# Patient Record
Sex: FEMALE | Race: WHITE | NOT HISPANIC OR LATINO | URBAN - METROPOLITAN AREA
[De-identification: names, ages, dates, MRNs, and addresses within clinical notes are randomized per-mention and may not be internally consistent; named-entity substitution may affect disease eponyms.]

---

## 2018-04-04 PROBLEM — 271832001 FLATULENCE, ERUCTATION AND GAS PAIN: Status: ACTIVE | Noted: 2017-10-31

## 2018-04-04 PROBLEM — 274527008 ABNORMAL FINDINGS DIAGNOSTIC IMAGING OF LIVER AND BILIARY TRACT: Status: ACTIVE | Noted: 2018-04-04

## 2018-04-24 PROBLEM — 40739000 DYSPHAGIA: Status: ACTIVE | Noted: 2018-04-24

## 2018-06-26 PROBLEM — 196731005 GASTRODUODENITIS: Status: ACTIVE | Noted: 2018-06-26

## 2018-07-18 PROBLEM — 197091007 DIVERTICULAR DISEASE OF BOTH SMALL AND LARGE INTESTINE WITHOUT PERFORATION OR ABSCESS: Status: ACTIVE | Noted: 2018-07-16

## 2018-07-18 PROBLEM — 3951002: Status: ACTIVE | Noted: 2018-07-18

## 2018-07-18 PROBLEM — 92411005 BENIGN NEOPLASM OF STOMACH: Status: ACTIVE | Noted: 2018-07-16

## 2018-07-18 PROBLEM — 4556007 GASTRITIS: Status: ACTIVE | Noted: 2018-07-16

## 2018-10-16 PROBLEM — 4556007: Status: ACTIVE | Noted: 2018-10-16

## 2020-03-31 PROBLEM — 280992001 PERFORATED DIVERTICULUM OF LARGE INTESTINE: Status: ACTIVE | Noted: 2020-03-31

## 2020-06-01 ENCOUNTER — LAB OUTSIDE AN ENCOUNTER (OUTPATIENT)
Dept: URBAN - METROPOLITAN AREA CLINIC 35 | Facility: CLINIC | Age: 60
End: 2020-06-01

## 2020-06-03 ENCOUNTER — TELEPHONE ENCOUNTER (OUTPATIENT)
Dept: URBAN - METROPOLITAN AREA CLINIC 35 | Facility: CLINIC | Age: 60
End: 2020-06-03

## 2020-06-03 RX ORDER — VANCOMYCIN HYDROCHLORIDE 125 MG/1
1 CAPSULE CAPSULE ORAL
Qty: 40 | Refills: 0 | OUTPATIENT
Start: 2020-06-03

## 2020-06-04 LAB
C. DIFFICILE TOXIN A/B, STOOL - QDX: NEGATIVE
CALPROTECTIN, STOOL - QDX: (no result)
GASTROINTESTINAL PATHOGEN: (no result)
PANCREATICELASTASE ELISA, STOOL: (no result)

## 2020-06-05 ENCOUNTER — TELEPHONE ENCOUNTER (OUTPATIENT)
Dept: URBAN - METROPOLITAN AREA CLINIC 35 | Facility: CLINIC | Age: 60
End: 2020-06-05

## 2020-06-05 RX ORDER — MAGNESIUM OXIDE/MAG AA CHELATE 300 MG
1 CAPSULE WITH A MEAL CAPSULE ORAL ONCE A DAY
Status: ACTIVE | COMMUNITY

## 2020-06-05 RX ORDER — BUPROPION HYDROCHLORIDE 300 MG/1
1 TABLET IN THE MORNING TABLET, EXTENDED RELEASE ORAL ONCE A DAY
Status: ACTIVE | COMMUNITY

## 2020-06-05 RX ORDER — ALPRAZOLAM 2 MG/1
1 TABLET TABLET, EXTENDED RELEASE ORAL TWICE A DAY
Status: ACTIVE | COMMUNITY

## 2020-06-05 RX ORDER — HYDROXYCHLOROQUINE SULFATE 200 MG/1
1 TABLET WITH FOOD OR MILK TABLET ORAL BID
Status: ACTIVE | COMMUNITY

## 2020-06-05 RX ORDER — MESALAMINE 1000 MG/1
1 SUPPOSITORY AT BEDTIME SUPPOSITORY RECTAL ONCE A DAY
Qty: 30 | Status: ACTIVE | COMMUNITY
Start: 2020-05-11

## 2020-06-05 RX ORDER — RIZATRIPTAN BENZOATE 5 MG/1
1 TABLET ON THE TONGUE AND ALLOW TO DISSOLVE AS NEEDED ONE TIME TABLET, ORALLY DISINTEGRATING ORAL ONCE A DAY
Status: ON HOLD | COMMUNITY

## 2020-06-05 RX ORDER — HYDROXYCHLOROQUINE SULFATE 200 MG/1
1 TABLET WITH FOOD OR MILK TABLET ORAL TWICE A DAY
Status: ON HOLD | COMMUNITY

## 2020-06-05 RX ORDER — ZOLPIDEM TARTRATE 5 MG/1
(SCHEDULE IV DRUG) TAKE ONE TABLET BY MOUTH AT BEDTIME TABLET, COATED ORAL
Qty: 30 UNSPECIFIED | Refills: 0 | Status: ACTIVE | COMMUNITY

## 2020-06-05 RX ORDER — HYDROCORTISONE ACETATE 0.5 %
AS DIRECTED CREAM (GRAM) TOPICAL
Status: ACTIVE | COMMUNITY

## 2020-06-05 RX ORDER — SULFAMETHOXAZOLE AND TRIMETHOPRIM 800; 160 MG/1; MG/1
1 TABLET TABLET ORAL TWICE A DAY
Qty: 20 | Refills: 0 | Status: ON HOLD | COMMUNITY
Start: 2020-03-13

## 2020-06-05 RX ORDER — NITROFURANTOIN MONOHYDRATE/MACROCRYSTALLINE 25; 75 MG/1; MG/1
1 CAPSULE AT BEDTIME WITH FOOD CAPSULE ORAL ONCE A DAY
Qty: 30 | Status: ACTIVE | COMMUNITY

## 2020-06-05 RX ORDER — ESOMEPRAZOLE MAGNESIUM 40 MG/1
1 CAPSULE CAPSULE, DELAYED RELEASE ORAL ONCE A DAY
Status: ON HOLD | COMMUNITY

## 2020-06-05 RX ORDER — HYOSCYAMINE SULFATE 0.125 MG
1 TABLET AS NEEDED TABLET ORAL
Qty: 60 TABLET | Refills: 1 | Status: ON HOLD | COMMUNITY
Start: 2018-06-27

## 2020-06-05 RX ORDER — LACTOBACILLUS RHAMNOSUS GG 10B CELL
1 CAPSULE CAPSULE ORAL ONCE A DAY
Status: ACTIVE | COMMUNITY

## 2020-06-05 RX ORDER — NEBIVOLOL HYDROCHLORIDE 5 MG/1
1 TABLET TABLET ORAL ONCE A DAY
Status: ACTIVE | COMMUNITY

## 2020-06-05 RX ORDER — ELUXADOLINE 75 MG/1
1 TABLET WITH FOOD TABLET, FILM COATED ORAL TWICE A DAY
Status: ON HOLD | COMMUNITY
Start: 2018-09-18

## 2020-06-05 RX ORDER — GABAPENTIN 100 MG/1
1 CAPSULE CAPSULE ORAL ONCE A DAY
Status: ON HOLD | COMMUNITY

## 2020-06-05 RX ORDER — VANCOMYCIN HYDROCHLORIDE 125 MG/1
1 CAPSULE CAPSULE ORAL
Qty: 40 | Refills: 0 | Status: ACTIVE | COMMUNITY
Start: 2020-06-03

## 2020-06-05 RX ORDER — IBUPROFEN 800 MG/1
TAKE ONE TABLET BY MOUTH THREE TIMES A DAY TABLET, FILM COATED ORAL
Qty: 90 UNSPECIFIED | Refills: 0 | Status: ACTIVE | COMMUNITY

## 2020-06-05 RX ORDER — MONTELUKAST SODIUM 10 MG/1
1 TABLET IN THE EVENING TABLET, FILM COATED ORAL ONCE A DAY
Status: ACTIVE | COMMUNITY

## 2020-06-05 RX ORDER — MULTIVIT-MIN/IRON/FOLIC ACID/K 18-600-40
AS DIRECTED CAPSULE ORAL
Status: ACTIVE | COMMUNITY

## 2020-06-05 RX ORDER — B-COMPLEX WITH VITAMIN C
1 TABLET TABLET ORAL ONCE A DAY
Status: ACTIVE | COMMUNITY

## 2020-06-05 RX ORDER — TRAMADOL HYDROCHLORIDE 50 MG/1
TABLET, FILM COATED ORAL TWICE DAILY
Status: ACTIVE | COMMUNITY

## 2020-06-05 RX ORDER — METRONIDAZOLE 500 MG/1
1 TABLET TABLET, FILM COATED ORAL THREE TIMES A DAY
Qty: 30 | Refills: 0 | Status: ON HOLD | COMMUNITY
Start: 2020-03-12

## 2020-06-05 RX ORDER — PRAVASTATIN SODIUM 20 MG/1
1 TABLET TABLET ORAL ONCE A DAY
Status: ACTIVE | COMMUNITY

## 2020-06-05 RX ORDER — CHOLECALCIFEROL TAB 50 MCG (2000 UNIT) 50 MCG
1 TABLET TAB ORAL ONCE A DAY
Status: ACTIVE | COMMUNITY

## 2020-06-05 RX ORDER — CIPROFLOXACIN HCL 500 MG
1 TABLET TABLET ORAL
Qty: 20 | Refills: 0 | Status: ON HOLD | COMMUNITY
Start: 2020-03-12

## 2020-06-05 RX ORDER — VORTIOXETINE 10 MG/1
1 TABLET TABLET, FILM COATED ORAL ONCE A DAY
Status: ACTIVE | COMMUNITY

## 2020-06-05 RX ORDER — LEVOMEFOLATE/ALGAL OIL 15-90.314
1 CAPSULE CAPSULE ORAL ONCE A DAY
Status: ACTIVE | COMMUNITY

## 2020-06-05 RX ORDER — HYOSCYAMINE SULFATE 0.12 MG/1
1 TABLET AS NEEDED TABLET ORAL
Qty: 40 TABLET | Refills: 1 | Status: ACTIVE | COMMUNITY
Start: 2020-03-03

## 2020-06-05 RX ORDER — SPIRONOLACTONE 25 MG/1
1 TABLET TABLET ORAL ONCE A DAY
Status: ACTIVE | COMMUNITY

## 2020-06-05 RX ORDER — MESALAMINE 1000 MG/1
1 SUPPOSITORY SUPPOSITORY RECTAL BID
Qty: 60 | Refills: 1 | Status: ON HOLD | COMMUNITY
Start: 2018-07-13

## 2020-06-11 ENCOUNTER — TELEPHONE ENCOUNTER (OUTPATIENT)
Dept: URBAN - METROPOLITAN AREA CLINIC 35 | Facility: CLINIC | Age: 60
End: 2020-06-11

## 2020-06-11 RX ORDER — CHOLESTYRAMINE 4 G/9G
1 PACKET MIXED WITH WATER OR NON-CARBONATED DRINK POWDER, FOR SUSPENSION ORAL TWICE A DAY
Qty: 60 | Refills: 1 | OUTPATIENT
Start: 2020-06-11

## 2020-06-15 ENCOUNTER — TELEPHONE ENCOUNTER (OUTPATIENT)
Dept: URBAN - METROPOLITAN AREA CLINIC 35 | Facility: CLINIC | Age: 60
End: 2020-06-15

## 2020-06-15 RX ORDER — FIDAXOMICIN 200 MG/1
1 TABLET TABLET, FILM COATED ORAL TWICE A DAY
Qty: 20 | Refills: 0 | OUTPATIENT
Start: 2020-06-15

## 2020-06-15 RX ORDER — COLESEVELAM HYDROCHLORIDE 625 MG/1
3 TABLETS WITH MEALS TABLET, FILM COATED ORAL TWICE A DAY
Qty: 180 | Refills: 0 | OUTPATIENT
Start: 2020-06-15

## 2020-06-16 ENCOUNTER — OFFICE VISIT (OUTPATIENT)
Dept: URBAN - METROPOLITAN AREA CLINIC 35 | Facility: CLINIC | Age: 60
End: 2020-06-16

## 2020-06-24 ENCOUNTER — TELEPHONE ENCOUNTER (OUTPATIENT)
Dept: URBAN - METROPOLITAN AREA CLINIC 35 | Facility: CLINIC | Age: 60
End: 2020-06-24

## 2020-06-24 ENCOUNTER — OFFICE VISIT (OUTPATIENT)
Dept: URBAN - METROPOLITAN AREA CLINIC 35 | Facility: CLINIC | Age: 60
End: 2020-06-24

## 2020-06-24 VITALS
DIASTOLIC BLOOD PRESSURE: 74 MMHG | HEART RATE: 84 BPM | HEIGHT: 64 IN | WEIGHT: 150 LBS | SYSTOLIC BLOOD PRESSURE: 118 MMHG | BODY MASS INDEX: 25.61 KG/M2 | TEMPERATURE: 98.4 F | OXYGEN SATURATION: 97 %

## 2020-06-24 PROBLEM — 307496006: Status: ACTIVE | Noted: 2020-03-03

## 2020-06-24 RX ORDER — RIZATRIPTAN BENZOATE 5 MG/1
1 TABLET ON THE TONGUE AND ALLOW TO DISSOLVE AS NEEDED ONE TIME TABLET, ORALLY DISINTEGRATING ORAL ONCE A DAY
Status: ON HOLD | COMMUNITY

## 2020-06-24 RX ORDER — LACTOBACILLUS RHAMNOSUS GG 10B CELL
1 CAPSULE CAPSULE ORAL ONCE A DAY
Status: ACTIVE | COMMUNITY

## 2020-06-24 RX ORDER — MESALAMINE 1000 MG/1
1 SUPPOSITORY SUPPOSITORY RECTAL BID
Qty: 60 | Refills: 1 | Status: ON HOLD | COMMUNITY
Start: 2018-07-13

## 2020-06-24 RX ORDER — COLESEVELAM HYDROCHLORIDE 625 MG/1
3 TABLETS WITH MEALS TABLET, FILM COATED ORAL TWICE A DAY
Qty: 180 | Refills: 0 | Status: ACTIVE | COMMUNITY
Start: 2020-06-15

## 2020-06-24 RX ORDER — ALPRAZOLAM 2 MG/1
1 TABLET TABLET, EXTENDED RELEASE ORAL TWICE A DAY
Status: ACTIVE | COMMUNITY

## 2020-06-24 RX ORDER — VORTIOXETINE 10 MG/1
1 TABLET TABLET, FILM COATED ORAL ONCE A DAY
Status: ACTIVE | COMMUNITY

## 2020-06-24 RX ORDER — FIDAXOMICIN 200 MG/1
1 TABLET TABLET, FILM COATED ORAL TWICE A DAY
Qty: 20 | Refills: 0 | Status: ACTIVE | COMMUNITY
Start: 2020-06-15

## 2020-06-24 RX ORDER — CHOLESTYRAMINE 4 G/9G
1 PACKET MIXED WITH WATER OR NON-CARBONATED DRINK POWDER, FOR SUSPENSION ORAL TWICE A DAY
Qty: 60 | Refills: 1 | Status: ON HOLD | COMMUNITY
Start: 2020-06-11

## 2020-06-24 RX ORDER — HYOSCYAMINE SULFATE 0.125 MG
1 TABLET AS NEEDED TABLET ORAL
Qty: 60 TABLET | Refills: 1 | OUTPATIENT
Start: 2018-06-27

## 2020-06-24 RX ORDER — ELUXADOLINE 75 MG/1
1 TABLET WITH FOOD TABLET, FILM COATED ORAL TWICE A DAY
Status: ON HOLD | COMMUNITY
Start: 2018-09-18

## 2020-06-24 RX ORDER — NITROFURANTOIN MONOHYDRATE/MACROCRYSTALLINE 25; 75 MG/1; MG/1
1 CAPSULE AT BEDTIME WITH FOOD CAPSULE ORAL ONCE A DAY
Qty: 30 | Status: ON HOLD | COMMUNITY

## 2020-06-24 RX ORDER — IBUPROFEN 800 MG/1
TAKE ONE TABLET BY MOUTH THREE TIMES A DAY TABLET, FILM COATED ORAL
Qty: 90 UNSPECIFIED | Refills: 0 | Status: ACTIVE | COMMUNITY

## 2020-06-24 RX ORDER — SPIRONOLACTONE 25 MG/1
1 TABLET TABLET ORAL ONCE A DAY
Status: ACTIVE | COMMUNITY

## 2020-06-24 RX ORDER — ZOLPIDEM TARTRATE 5 MG/1
(SCHEDULE IV DRUG) TAKE ONE TABLET BY MOUTH AT BEDTIME TABLET, COATED ORAL
Qty: 30 UNSPECIFIED | Refills: 0 | Status: ACTIVE | COMMUNITY

## 2020-06-24 RX ORDER — MAGNESIUM OXIDE/MAG AA CHELATE 300 MG
1 CAPSULE WITH A MEAL CAPSULE ORAL ONCE A DAY
Status: ACTIVE | COMMUNITY

## 2020-06-24 RX ORDER — BUPROPION HYDROCHLORIDE 300 MG/1
1 TABLET IN THE MORNING TABLET, EXTENDED RELEASE ORAL ONCE A DAY
Status: ACTIVE | COMMUNITY

## 2020-06-24 RX ORDER — HYOSCYAMINE SULFATE 0.12 MG/1
1 TABLET AS NEEDED TABLET ORAL
Qty: 40 TABLET | Refills: 1 | Status: ACTIVE | COMMUNITY
Start: 2020-03-03

## 2020-06-24 RX ORDER — HYDROXYCHLOROQUINE SULFATE 200 MG/1
1 TABLET WITH FOOD OR MILK TABLET ORAL BID
Status: ACTIVE | COMMUNITY

## 2020-06-24 RX ORDER — MESALAMINE 1000 MG/1
1 SUPPOSITORY AT BEDTIME SUPPOSITORY RECTAL ONCE A DAY
Qty: 30 | Status: ACTIVE | COMMUNITY
Start: 2020-05-11

## 2020-06-24 RX ORDER — SULFAMETHOXAZOLE AND TRIMETHOPRIM 800; 160 MG/1; MG/1
1 TABLET TABLET ORAL TWICE A DAY
Qty: 20 | Refills: 0 | Status: ON HOLD | COMMUNITY
Start: 2020-03-13

## 2020-06-24 RX ORDER — FIDAXOMICIN 200 MG/1
1 TABLET TABLET, FILM COATED ORAL TWICE A DAY
Qty: 10 TABLET | OUTPATIENT
Start: 2020-06-24

## 2020-06-24 RX ORDER — GABAPENTIN 100 MG/1
1 CAPSULE CAPSULE ORAL ONCE A DAY
Status: ON HOLD | COMMUNITY

## 2020-06-24 RX ORDER — TRAMADOL HYDROCHLORIDE 50 MG/1
TABLET, FILM COATED ORAL TWICE DAILY
Status: ACTIVE | COMMUNITY

## 2020-06-24 RX ORDER — NEBIVOLOL HYDROCHLORIDE 5 MG/1
1 TABLET TABLET ORAL ONCE A DAY
Status: ACTIVE | COMMUNITY

## 2020-06-24 RX ORDER — METRONIDAZOLE 500 MG/1
1 TABLET TABLET, FILM COATED ORAL THREE TIMES A DAY
Qty: 30 | Refills: 0 | Status: ON HOLD | COMMUNITY
Start: 2020-03-12

## 2020-06-24 RX ORDER — HYDROXYCHLOROQUINE SULFATE 200 MG/1
1 TABLET WITH FOOD OR MILK TABLET ORAL TWICE A DAY
Status: ON HOLD | COMMUNITY

## 2020-06-24 RX ORDER — B-COMPLEX WITH VITAMIN C
1 TABLET TABLET ORAL ONCE A DAY
Status: ACTIVE | COMMUNITY

## 2020-06-24 RX ORDER — CIPROFLOXACIN HCL 500 MG
1 TABLET TABLET ORAL
Qty: 20 | Refills: 0 | Status: ON HOLD | COMMUNITY
Start: 2020-03-12

## 2020-06-24 RX ORDER — ESOMEPRAZOLE MAGNESIUM 40 MG/1
1 CAPSULE CAPSULE, DELAYED RELEASE ORAL ONCE A DAY
Status: ON HOLD | COMMUNITY

## 2020-06-24 RX ORDER — VANCOMYCIN HYDROCHLORIDE 125 MG/1
1 CAPSULE CAPSULE ORAL
Qty: 40 | Refills: 0 | Status: ON HOLD | COMMUNITY
Start: 2020-06-03

## 2020-06-24 RX ORDER — PRAVASTATIN SODIUM 20 MG/1
1 TABLET TABLET ORAL ONCE A DAY
Status: ACTIVE | COMMUNITY

## 2020-06-24 RX ORDER — HYDROCORTISONE ACETATE 0.5 %
AS DIRECTED CREAM (GRAM) TOPICAL
Status: ACTIVE | COMMUNITY

## 2020-06-24 RX ORDER — HYOSCYAMINE SULFATE 0.125 MG
1 TABLET AS NEEDED TABLET ORAL
Qty: 60 TABLET | Refills: 1 | Status: ON HOLD | COMMUNITY
Start: 2018-06-27

## 2020-06-24 RX ORDER — MONTELUKAST SODIUM 10 MG/1
1 TABLET IN THE EVENING TABLET, FILM COATED ORAL ONCE A DAY
Status: ACTIVE | COMMUNITY

## 2020-06-24 RX ORDER — CHOLECALCIFEROL TAB 50 MCG (2000 UNIT) 50 MCG
1 TABLET TAB ORAL ONCE A DAY
Status: ACTIVE | COMMUNITY

## 2020-06-24 RX ORDER — MULTIVIT-MIN/IRON/FOLIC ACID/K 18-600-40
AS DIRECTED CAPSULE ORAL
Status: ACTIVE | COMMUNITY

## 2020-06-24 RX ORDER — LEVOMEFOLATE/ALGAL OIL 15-90.314
1 CAPSULE CAPSULE ORAL ONCE A DAY
Status: ACTIVE | COMMUNITY

## 2020-06-24 NOTE — HPI-MIGRATED HPI
;   ;   ;   ;   ;   ;   ;     Abdominal Pain : Patient contact the office on (5/15/2020) c/o continue to experience abdominal pain and fecal incontinence 5+ times a day.   She admit lower abdominal pain below the umbilicus. Symptoms are present most morning upon waking up and will persist throughout the day.  She has been taking Hyoscyamine 2-3 times a day with temporary relief.       Last visit (5/15/2020) Patient presents today via televisit with consent for a follow up of increase LLQ abdominal pain.   Pain has been present constant and described as a dull ache. Denies any aggravating factors.   Admit some relief with the use of Hyoscyamine 1 BID.  She contact the office on (3/8/2020) c/o 5-6 loose BM's per day with fecal seepage and LLQ abdominal pain. Symptoms were improved then began to increase several weeks ago.  Denies melena, blood or mucus in stools. Denies pruritus ani, rectal pain or bleeding.   Abdominal pain is described as a dull ache that occur randomly throughout the day and will last for minutes to hours.  She ran out of the Hyoscyamine weeks ago.  Denies fever or chills.  Subsequently patient was informed to go to ER if LLQ pain is persistent and worse.      Last visit (3/31/2020) Patient presents today via tele visit to review CT, labs, stool study and follow up of LLQ abdominal pain associated with diverticulitis.  She admit improvement of symptoms with the use of Hyoscyamine and Ibuprofen 800 mg prn .  Last episode occurred yesterday.   Described as a sharp pain, that is typically present each day upon waking in the mornings.  She completed treatment for diverticulitis, Bactrim DS and Flagyl on (3/22/2020)   Of note, patient contact the office on (3/13/2020) with c/o 5-6 loose and watery BM's per day and episodes of fecal incontinence since 2 days after her last visit.  Described as full fecal seepage that occur with bending over or walking.   She state she has been treated with 3 antibiotics since Jan. 2020- Cefdnir x 10 days in Jan. 2020 for URI, then Cipro 500 mg 1 BID x 7 days on (2/3/2020), which she took for 3 days then told to d/c and start Moxifloxacin 100 mg x 10 days on (2/11/2020) due to the Diverticulitis.   Subsequently a stool study was ordered, treated with Flagyl 500 mg TID, Bactrim DS BID x 10 days, take Imodium prn with caution.  On (3/19/2020) patient continue to c/o LLQ abdominal pain upon waking up in the mornings. Pain is sharp in nature.  She will take Hyoscyamine, but it will take 1-2 hours to get relief.   Last visit (3/3/2020) Patient presents today for a follow up of abdominal pain. She denies any episodes of abdominal pain since her last office visit.   Patient had a CT Chest/ABD/Pelvis completed on 2/5/2020 showing mild diverticulitis of the proximal descending colon, with mild moderate inflammatory changes, including fat stranding and trace fluid. Moderate diffuse diverticulosis of the colon, more extensively involving the left colon. Groundglass infiltrate in the posterior right upper lobe, suspicious for possible pneumonia. Mild hepatic steatosis.   Patient admits that she was on abx and steroids due to a respiratory infection in December   Last visit (1/15/2019) Patient admits experiencing abdominal pain rarely since her last office visit.    10/16/18 Patient marks improvement of abdominal pain. She states that she has had 4-5 episodes since her last office visit 09/18/2018.  09/18/2018 Patient continues to have abdominal pain daily. She describes episodes as a cramping sensation throughout her lower abdomen. She typically begins to have symptoms after consuming a meal.  She is unsure if symptoms are improved with Dicyclomine 10 mg BID.   Of note, Patient called the office on 09/05/2018 complaining of abdominal cramping and  fecal incontinence. As a result we increased her Welchol 625mg from 2 tabs po bid to 2 tabs po tid.   08/22/2018 She admit improvement of symptoms with the use of Dicyclomine 10 mg BID.    Of note, patient contact the office (8/1/2018) stating the Hyoscyamine wasn't as effective, subsequently she was changed to Dicyclomine 10 mg po TID prn.  Last visit (7/18/2018) Patient continues to have abdominal discomfort. She admits the pain is infrequent and ranges from the left to right side of her abdomen. She continues to deny any aggravating factors.    Last visit (6/26/2018) Patient continues to have right sided abdominal pain accompanied with diarrhea daily. She describes episodes as a severe cramping sensation. She denies any aggravating factors.    04/24/2018 Continues to have right sided abdominal pain , intermittent severity , constant . No clear aggravating or alleviating factors     At last visit (10/31/2017) Admits abdominal pain that is located mid region and occur in an intermittent pattern.  Onset Dec. 2016 right after her hemorrhoidectomy and never got better.  Described as a dull ache that last for minutes to hours at a time.   Symptoms are mostly noticed upon waking up.  Admit relief of symptoms after defecation.     Patient denies any episodes of RUQ abdominal pain this year.  Symptoms were described as a dull aching to crampy and sharp pain.  Pain was initially intermittently, then  began to  worsen first of May. Patient notes as symptoms progressively worsen she went to [facility] ER 5/3/2016 and at that time her pain was severe, level 8 /10.  The Location of pain at the time she was seen in ER was RUQ abdomen and radiated to right side of her back, and middle of the back. Pain was worse with inhalation.  She had CT Abdomen, Chest Xray and was treated with IV fluids, Morphine with great relief of symptoms. Patient was discharged with [facility] 5/325 #12 and to follow up with GI, and PCP.  She denies any abdominal pain since the day after she discharged from [facility] ER.  Patient denies the use of antibiotics within the last (6) months. 	    Outside Labs:  (5/3/2016) Glucose 105 (H), Creatinine 1.16 (H), Protein Total 8.5 (H), ALT 36 (N), AST 20 (N), Albumin 4.5 (N), Total Bilirubin 0.6 (N), Total Calcium 10.6 (H), Alkaline Phosphatase 65 (N), Lipase 391 (N), WBC 7.2 (N), RBC 4.98 (N), Hemoglobin 15.4 (N), Hematocrit 44.4 (H), Platelet 345 (N).    Outside Imaging: (5/3/2016) CT Pelvis with impression: No acute intra-abdominal process. Colonic diverticulosis without diverticulitis. Normal appendix. No bowel obstruction. Persistent prominent appearance of the pancreatic duct measuring up to 3.9 mm in maximal diameter. Fatty liver with hepatomegaly.  (05/15/2015) CT Abdomen and Pelvis with contrast impression: Normal appendix. Descending and sigmoid colon diverticula. No diverticulitis. Borderline pancreatic ductal dilation. No biliary ductal dilation. Further evaluation with MRI of the abdomen and MRCP may be obtained.;   Fecal incontinence : Continue to experience Fecal incontinence episodes daily associated with diarrhea episodes, 5+ times a day..  She has been wearing under pads due to symptoms.   Last visit (5/11/2020) Admit having fecal incontinence while sitting last week. She has a Cystocele/Rectocele since 2016 and report having Vaginal Pessary Incerted in (2019) performed by Dr. Edie Holden.   Last visit (3/31/2020) Continue to experience Fecal incontinence episodes daily associated with diarrhea episodes.  She has been wearing under pads due to symptoms.    Of note, patient contact the office on (3/13/2020) with c/o 5-6 loose and watery BM's per day and episodes of fecal incontinence since 2 days after her last visit.  Described as full fecal seepage that occur with bending over or walking.      Last visit (3/3/2020) Patient denies episodes of Fecal incontinence since last office visit.  Last visit (1/15/2019) Patient denies experiencing any fecal incontinence episodes since her last office visit.    10/16/18 Patient states that she has had improvement of symptoms. She has had less episodes since having a pessary device inserted.  09/18/2018 Patient continues to have episodes of fecal incontinence daily.   08/22/2018 She continue to experience fecal incontinence. Symptoms occur 1-2 times per day.  She continue to ware a pad in her under garment, because she does not feel the leakage.    Last visit (7/18/2018)Patient continues to have fecal incontinence.   Last visit (6/26/2018) Patient continues to have fecal incontinence. She states that she has seen Dr. Butt since her last visit. Of note, this was prior to her having diarrhea. At the time of her consultation he diagnosed her with cystocele and rectocele. In addition, they discussed her having a device placed in her back to help control her sphincter.   04/24/2018 Continues to have fecal incontinence and will be following up with Dr. ATUL Butt Continue to experience Fecal incontinence episodes daily associated with diarrhea episodes.  she has been wearing under pads due to symptoms.    Of note, patient contact the office on (3/13/2020) with c/o 5-6 loose and watery BM's per day and episodes of fecal incontinence since 2 days after her last visit.  Described as full fecal seepage that occur with bending over or walking.      Last visit (3/3/2020) Patient denies episodes of Fecal incontinence since last office visit.  Last visit (1/15/2019) Patient denies experiencing any fecal incontinence episodes since her last office visit.    10/16/18 Patient states that she has had improvement of symptoms. She has had less episodes since having a pessary device inserted.  09/18/2018 Patient continues to have episodes of fecal incontinence daily.   08/22/2018 She continue to experience fecal incontinence. Symptoms occur 1-2 times per day.  She continue to ware a pad in her under garment, because she does not feel the leakage.    Last visit (7/18/2018)Patient continues to have fecal incontinence.   Last visit (6/26/2018) Patient continues to have fecal incontinence. She states that she has seen Dr. Butt since her last visit. Of note, this was prior to her having diarrhea. At the time of her consultation he diagnosed her with cystocele and rectocele. In addition, they discussed her having a device placed in her back to help control her sphincter.   04/24/2018 Continues to have fecal incontinence and will be following up with Dr. ATUL Butt ;   Abnormal MRI : Last visit (1/15/2019) Patient admits that she has had a heart test completed as well as a MRI of her neck completed in December and she has not yet received the results.  10/16/2018 Denie any recent imaging completed.  09/18/2018 No recent imaging completed.  04/24/2018 Patient presents today for a follow up of her pancreas  divisum which was diagnosed via MRI Abdomen May 29, 2015.  She notes of having low back pain in May 2015 and was seen by her PCP who subsequently ordered an MRI Abdomen w/wo contrast.  (5/29/2015) MR Abdomen w/wo contrast impression: Pancreas divisum with separate drainage of the pancreatic duct and common bile duct ar the duodenum. Mild, diffuse pancreatic ductal dilation without obstructing mass or calculus.    Last visit (1/15/2019) Patient admits that she has had a heart test completed as well as a MRI of her neck completed in December and she has not yet received the results.  10/16/18 No recent imaging completed.  09/18/2018 No recent imaging completed.  04/24/2018 Patient presents today for a follow up of her pancreas  divisum which was diagnosed via MRI Abdomen May 29, 2015.  She notes of having low back pain in May 2015 and was seen by her PCP who subsequently ordered an MRI Abdomen w/wo contrast.  (5/29/2015) MR Abdomen w/wo contrast impression: Pancreas divisum with separate drainage of the pancreatic duct and common bile duct ar the duodenum. Mild, diffuse pancreatic ductal dilation without obstructing mass or calculus. ;   Dysphagia : She continue to deny episodes of dysphagia Last visit (5/11/2020) She continue to deny episodes of dysphagia  Last visit (3/31/2020) She continue to deny episodes of dyspragia.  Last visit (3/3/2020) Patient denies episodes of dyspragia since last office visit.  Last visit (1/15/2019) Patient denies any episodes of dysphagia since her EGD with dilation was completed.   10/16/18 Patient continues to deny episodes of dysphagia.   09/18/2018 Patient continues to deny episodes of dysphagia.    08/22/2018 Continue to deny dysphagia since EGD w/dilation.    Last visit (7/18/2018) Patient denies recent episodes of dysphagia.  Last visit (6/26/2018) Patient denies recent episodes of dysphagia.  04/24/2018 Continues to have dysphagia with solid food , upper esophagus , few times a week    At last visit (10/31/2017)Difficulty in swallowing since the past year, few times a month associated with dryness of mouth due to Sjogren's She continue to deny episodes of dyspragia.  Last visit (3/3/2020) Patient denies episodes of dyspragia since last office visit.  Last visit (1/15/2019) Patient denies any episodes of dysphagia since her EGD with dilation was completed.   10/16/18 Patient continues to deny episodes of dysphagia.   09/18/2018 Patient continues to deny episodes of dysphagia.    08/22/2018 Continue to deny dysphagia since EGD w/dilation.    Last visit (7/18/2018) Patient denies recent episodes of dysphagia.  Last visit (6/26/2018) Patient denies recent episodes of dysphagia.  04/24/2018 Continues to have dysphagia with solid food , upper esophagus , few times a week    At last visit (10/31/2017)Difficulty in swallowing since the past year, few times a month associated with dryness of mouth due to Sjogren's    ;   Fatty Liver : Last visit (3/31/2020) She continue to deny  jaundice, chills, RUQ pains, dizziness, or light headedness. Admit still feeling fatigue she is unsure if this has to do with her Liver or her Sjrogen's Disease.   Last visit (3/3/2020) Patient denies jaundice, chills, RUQ pains, dizziness, or light headedness. Admits still feeling fatigue she is unsure if this has to do with her Liver or her Sjrogen's Disease.  Last visit (01/15/2019) Patient currently denies jaundice, chills, RUQ pains, dizziness, or light headedness. Admits still feeling fatigue.    10/16/18 Patient was diagnosed with fatty liver in 2017.  She currently denies jaundice, chills, RUQ pains, dizziness, or light headedness Admit easy bruising over past few months, which is being follow by Hematologist Dr. Hakeem Fallon.  Admit several year history of feeling fatigue.    09/18/2018 Patient was diagnosed with fatty liver in 2017.  She currently denies jaundice, chills, RUQ pains, dizziness, or light headedness Admit easy bruising over past few months, which is being follow by Hematologist Dr. Hakeem Fallon.  Admit several year history of feeling fatigue.   08/22/2018 Patient was diagnosed with a fatty liver in 2017. Most recent labs with PCP Dr. Michelle Naidu a month ago due to skin rash from an allergic reaction from an unknown source. Labs completed (7/12/2018) revealed CBC and CMP within normal limits.   She currently denies jaundice, chills, RUQ pains, dizziness, or light headedness Admit easy bruising over past few months, which is being follow by Hematologist Dr. Hakeem Fallon.  Admit several year history of feeling fatigue.    04/24/2018 Patient was found to have fatty liver via CT abdomen on May 3, 2017.  Most recent labs with PCP Dr. Michelle Naidu  Aug. 23, 2017 at which time patient states her liver enzymes were fine, just had elevated calcium levels.  Patient currently denies jaundice, chills, RUQ pains, dizziness, or light headedness Admit easy bruising over past few months, which is being follow by Hematologist Dr. Hakeem Fallon.  Admit several year history of feeling fatigue.   Outside Labs:  (5/3/2016) Glucose 105 (H), Creatinine 1.16 (H), Protein Total 8.5 (H), ALT 36 (N), AST 20 (WNL), Albumin 4.5 (N), Total Bilirubin 0.6 (N), Total Calcium 10.6 (H), Alkaline Phosphatase 65 (N), Lipase 391 (N), WBC 7.2 (N), RBC 4.98 (N), Hemoglobin 15.4 (N), Hematocrit 44.4 (H), Platelet 345 (N).  (10/31/2017)NAFLD Score: -0.979   &lt; -1.455: predictor of absence of significant fibrosis (F0-F2 fibrosis) ? -1.455 to ? 0.675: indeterminate score > 0.675: predictor of presence of significant fibrosis (F3-F4 fibrosis)     Outside Imaging: (5/3/2016) CT Pelvis with impression: No acute intra-abdominal process. Colonic diverticulosis without diverticulitis. Normal appendix. No bowel obstruction. Persistent prominent appearance of the pancreatic duct measuring up to 3.9 mm in maximal diameter. Fatty liver with hepatomegaly.  (05/15/2015) CT Abdomen and Pelvis with contrast impression: Normal appendix. Descending and sigmoid colon diverticula. No diverticulitis. Borderline pancreatic ductal dilation. No biliary ductal dilation. Further evaluation with MRI of the abdomen and MRCP may be obtained. She continue to deny  jaundice, chills, RUQ pains, dizziness, or light headedness. Admit still feeling fatigue she is unsure if this has to do with her Liver or her Sjrogen's Disease.   Last visit (3/3/2020) Patient denies jaundice, chills, RUQ pains, dizziness, or light headedness. Admits still feeling fatigue she is unsure if this has to do with her Liver or her Sjrogen's Disease.  Last visit (01/15/2019) Patient currently denies jaundice, chills, RUQ pains, dizziness, or light headedness. Admits still feeling fatigue.    10/16/18 Patient was diagnosed with fatty liver in 2017.  She currently denies jaundice, chills, RUQ pains, dizziness, or light headedness Admit easy bruising over past few months, which is being follow by Hematologist Dr. Hakeem Fallon.  Admit several year history of feeling fatigue.    09/18/2018 Patient was diagnosed with fatty liver in 2017.  She currently denies jaundice, chills, RUQ pains, dizziness, or light headedness Admit easy bruising over past few months, which is being follow by Hematologist Dr. Hakeem Fallon.  Admit several year history of feeling fatigue.   08/22/2018 Patient was diagnosed with a fatty liver in 2017. Most recent labs with PCP Dr. Michlele Naidu a month ago due to skin rash from an allergic reaction from an unknown source. Labs completed (7/12/2018) revealed CBC and CMP within normal limits.   She currently denies jaundice, chills, RUQ pains, dizziness, or light headedness Admit easy bruising over past few months, which is being follow by Hematologist Dr. Hakeem Fallon.  Admit several year history of feeling fatigue.    04/24/2018 Patient was found to have fatty liver via CT abdomen on May 3, 2017.  Most recent labs with PCP Dr. Michelle Naidu  Aug. 23, 2017 at which time patient states her liver enzymes were fine, just had elevated calcium levels.  Patient currently denies jaundice, chills, RUQ pains, dizziness, or light headedness Admit easy bruising over past few months, which is being follow by Hematologist Dr. Hakeem Fallon.  Admit several year history of feeling fatigue.   Outside Labs:  (5/3/2016) Glucose 105 (H), Creatinine 1.16 (H), Protein Total 8.5 (H), ALT 36 (N), AST 20 (WNL), Albumin 4.5 (N), Total Bilirubin 0.6 (N), Total Calcium 10.6 (H), Alkaline Phosphatase 65 (N), Lipase 391 (N), WBC 7.2 (N), RBC 4.98 (N), Hemoglobin 15.4 (N), Hematocrit 44.4 (H), Platelet 345 (N).  (10/31/2017)NAFLD Score: -0.979   &lt; -1.455: predictor of absence of significant fibrosis (F0-F2 fibrosis) ? -1.455 to ? 0.675: indeterminate score > 0.675: predictor of presence of significant fibrosis (F3-F4 fibrosis)     Outside Imaging: (5/3/2016) CT Pelvis with impression: No acute intra-abdominal process. Colonic diverticulosis without diverticulitis. Normal appendix. No bowel obstruction. Persistent prominent appearance of the pancreatic duct measuring up to 3.9 mm in maximal diameter. Fatty liver with hepatomegaly.  (05/15/2015) CT Abdomen and Pelvis with contrast impression: Normal appendix. Descending and sigmoid colon diverticula. No diverticulitis. Borderline pancreatic ductal dilation. No biliary ductal dilation. Further evaluation with MRI of the abdomen and MRCP may be obtained. ;   Diarrhea : Patient presents today for a follow up of diarrhea, review MRI, and QDx results.  Patient was notified on (6/3/2020) QDx stool study was positive for C. Diff, subsequently she was treated with Vancomycin 125 mg po QID for 10 days.  Patient contact the office on (6/15/2020) c/o symptoms are no better. Also continue to experience abdominal pain and fecal incontinence 5+ times a day.    She also want to know if the cholestyramine come in a tablet form, because it say it can damage teeth, which she already has risk of dental damage from her Sjogren's.   Subsequently, patient was treated with Dificid 200 mg po BID for 10 days. Welchol 625 mg III po BID and Imodium - 1-2,  4 times a day prn.  She has not taken Imodium.   She admit bowel movements were getting back to normal last week over the course of 3 days while on a bland diet with having 3 normal formed BM's per day.  While off the diet she would have 3-5 BM's per day with a couple episodes fecal incontinence.  Denies melena, blood or mucus in stools.     Last visit (5/11/2020) Admit 5-6 loose BM's per day with fecal seepage and LLQ abdominal pain. Symptoms were improved then began to increase several weeks ago.  Denies melena, blood or mucus in stools.  Denies pruritus ani, rectal pain or bleeding.    She report maybe 1-2 days a week she will have normal and formed bowel movements.     Last visit (3/31/2020) Admit 5-6 bowel movements per day with 2-3 episodes of diarrhea at least once a day since last office visit.  Denies melena, blood or mucus in stools.  Of note, patient contact the office on (3/13/2020) with c/o 5-6 loose and watery BM's per day and episodes of fecal incontinence since 2 days after her last visit.  Described as full fecal seepage that occur with bending over or walking.    Subsequently a stool study was ordered, treated with Flagyl 500 mg TID, Bactrim DS BID x 10 days, take Imodium prn with caution.  She completed the 10 day course of Flagyl 500 mg TID, Bactrim DS BID on (3/22/2020).   Last visit (3/3/2020)She reports 2-3 bowel movements a day with no strain. She admits her stools are formed with no bleeding, melena, or mucus at this time.   Last visit (1/15/2019) Patient presents today for a follow up of diarrhea. Patient admits rarely experiencing diarrhea since her last office visit. Patient admits 1-3 bowel movements a day. Stools are normal in form.   Patient admits that she is scheduled to see Dr Holden tomorrow.       Patient denies the use of Welchol Tablet, 625 MG, 2 tablets prn with meals, Orally, TID. Patient states that she wanted to clear her body from the medications that she was taking.   Patient denies the use of Dicyclomine HCl Capsule, 10 MG, 1 tablet prn, Orally, TID. Patient states that she felt that this medication wasn't helping her so she stopped taking it.    10/16/18 Patient presents today for a follow up of diarrhea. She states there has been an improvement of symptoms and she admits to having 2-3 bowel movements per day. Ranging from solid to loose stools.   Patient was evaluated by Dr. Holden a Urogynecologist at [facility] who administered a pessary, which is a prosthetic device that can be inserted into the vagina to support its internal structure. She was advised this device tends to improve diarrhea and fecal incontinence in patients that have rectocele. Patient also has started Pelvic Floor Therapy and has completed two sessions.   Of note, patient never started Viberzi Tablet, 75 MG.   09/18/2018 Patient presents today for a follow up of diarrhea. She continues to have episodes of watery/loose stools daily. She states she can have up to 5-6 bowel movements per day. She denies blood, mucous or melena.    08/22/2018 Patient presents today to review QDx stool study, lab results and follow up of diarrhea. She has been taking Welchol 625 MG, 2 BID with some improvement in bowel habits.  Currently admit 2-3 defecations per day.  Stools are 50% of the time normal formed and loose/watery.  Denies melena, blood or mucus in stools.  Denies fecal urgency.  Continue to admit episodes of fecal incontinence.      Last visit (7/18/2018) Patient presents today for a follow up after her colonoscopy, EGD and labs.  She denies any complications after her procedure. She continues to have 3-4 bowel movements per day ranging from solid to watery/loose. She denies mucous or melena. She does admit to occasional bright red blood present on toilet paper following a bowel movement. She denies taking  Imodium A-D or Hyoscyamine.  Last visit (6/26/2018) Patient presents today for a consultation of diarrhea since the past 6 months . She states that symptoms started around June 9 th, 2018 after eating dinner. She was evaluated by [facility] on Hannah 10, 2018 for her symptoms. At that time stool studies were completed. She is currently having approximately five watery/loose stools per day. She denies any blood, mucous or melena. However, patient admits the amount of stools may increase if she consumes more food. She has been avoiding eating because that triggers her to have a bowel movement. She has had an eight pound unintentional weight loss since her last visit on 04/24/2018.    She did have an MRI of the abdomen completed on 04/06/2018 which revealed subtle sludge or concentrated bile within the gallbladder.  Per patient her last colonoscopy was 2-3 years ago. She denies having colon polyps at that time.  Of, note patient was prescribed Keflex on May 20, 2018. Admit 5-6 bowel movements per day with 2-3 episodes of diarrhea at least once a day since last office visit.  Denies melena, blood or mucus in stools.  Of note, patient contact the office on (3/13/2020) with c/o 5-6 loose and watery BM's per day and episodes of fecal incontinence since 2 days after her last visit.  Described as full fecal seepage that occur with bending over or walking.    Subsequently a stool study was ordered, treated with Flagyl 500 mg TID, Bactrim DS BID x 10 days, take Imodium prn with caution.  She completed the 10 day course of Flagyl 500 mg TID, Bactrim DS BID on (3/22/2020).   Last visit (3/3/2020)She reports 2-3 bowel movements a day with no strain. She admits her stools are formed with no bleeding, melena, or mucus at this time.   Last visit (1/15/2019) Patient presents today for a follow up of diarrhea. Patient admits rarely experiencing diarrhea since her last office visit. Patient admits 1-3 bowel movements a day. Stools are normal in form.   Patient admits that she is scheduled to see Dr Holden tomorrow.       Patient denies the use of Welchol Tablet, 625 MG, 2 tablets prn with meals, Orally, TID. Patient states that she wanted to clear her body from the medications that she was taking.   Patient denies the use of Dicyclomine HCl Capsule, 10 MG, 1 tablet prn, Orally, TID. Patient states that she felt that this medication wasn't helping her so she stopped taking it.    10/16/18 Patient presents today for a follow up of diarrhea. She states there has been an improvement of symptoms and she admits to having 2-3 bowel movements per day. Ranging from solid to loose stools.   Patient was evaluated by Dr. Holden a Urogynecologist at [facility] who administered a pessary, which is a prosthetic device that can be inserted into the vagina to support its internal structure. She was advised this device tends to improve diarrhea and fecal incontinence in patients that have rectocele. Patient also has started Pelvic Floor Therapy and has completed two sessions.   Of note, patient never started Viberzi Tablet, 75 MG.   09/18/2018 Patient presents today for a follow up of diarrhea. She continues to have episodes of watery/loose stools daily. She states she can have up to 5-6 bowel movements per day. She denies blood, mucous or melena.    08/22/2018 Patient presents today to review QDx stool study, lab results and follow up of diarrhea. She has been taking Welchol 625 MG, 2 BID with some improvement in bowel habits.  Currently admit 2-3 defecations per day.  Stools are 50% of the time normal formed and loose/watery.  Denies melena, blood or mucus in stools.  Denies fecal urgency.  Continue to admit episodes of fecal incontinence.      Last visit (7/18/2018) Patient presents today for a follow up after her colonoscopy, EGD and labs.  She denies any complications after her procedure. She continues to have 3-4 bowel movements per day ranging from solid to watery/loose. She denies mucous or melena. She does admit to occasional bright red blood present on toilet paper following a bowel movement. She denies taking  Imodium A-D or Hyoscyamine.  Last visit (6/26/2018) Patient presents today for a consultation of diarrhea since the past 6 months . She states that symptoms started around June 9 th, 2018 after eating dinner. She was evaluated by [facility] on Hannah 10, 2018 for her symptoms. At that time stool studies were completed. She is currently having approximately five watery/loose stools per day. She denies any blood, mucous or melena. However, patient admits the amount of stools may increase if she consumes more food. She has been avoiding eating because that triggers her to have a bowel movement. She has had an eight pound unintentional weight loss since her last visit on 04/24/2018.    She did have an MRI of the abdomen completed on 04/06/2018 which revealed subtle sludge or concentrated bile within the gallbladder.  Per patient her last colonoscopy was 2-3 years ago. She denies having colon polyps at that time.  Of, note patient was prescribed Keflex on May 20, 2018.;   Hospital Follow Up : Denies any hospitalization since last visit.  Last visit (3/31/2020) Denies any hospitalization since last visit.   Patient presented to [facility] ED June 22, 2019 with abdominal pain.  Onset 5 days prior to ER.  Described as a constant pain located right abdomen and flank area.  Denies any aggravating factors. Denies any hospitalization since.     Patient presented to [facility] ED June 22, 2019 with abdominal pain.  Onset 5 days prior to ER.  Described as a constant pain located right abdomen and flank area.  Denies any aggravating factors.;

## 2020-07-06 ENCOUNTER — TELEPHONE ENCOUNTER (OUTPATIENT)
Dept: URBAN - METROPOLITAN AREA CLINIC 35 | Facility: CLINIC | Age: 60
End: 2020-07-06

## 2020-07-06 RX ORDER — DICYCLOMINE HYDROCHLORIDE 10 MG/1
1 CAPSULE CAPSULE ORAL
Qty: 30 | Refills: 1 | OUTPATIENT
Start: 2020-07-06

## 2020-07-13 ENCOUNTER — TELEPHONE ENCOUNTER (OUTPATIENT)
Dept: URBAN - METROPOLITAN AREA CLINIC 35 | Facility: CLINIC | Age: 60
End: 2020-07-13

## 2020-07-13 RX ORDER — COLESEVELAM HYDROCHLORIDE 625 MG/1
3 TABLETS WITH MEALS TABLET, FILM COATED ORAL TWICE A DAY
Qty: 180 | Refills: 0 | OUTPATIENT
Start: 2020-06-15

## 2020-07-14 ENCOUNTER — TELEPHONE ENCOUNTER (OUTPATIENT)
Dept: URBAN - METROPOLITAN AREA CLINIC 35 | Facility: CLINIC | Age: 60
End: 2020-07-14

## 2020-07-16 ENCOUNTER — TELEPHONE ENCOUNTER (OUTPATIENT)
Dept: URBAN - METROPOLITAN AREA CLINIC 35 | Facility: CLINIC | Age: 60
End: 2020-07-16

## 2020-07-16 ENCOUNTER — OFFICE VISIT (OUTPATIENT)
Dept: URBAN - METROPOLITAN AREA CLINIC 33 | Facility: CLINIC | Age: 60
End: 2020-07-16

## 2020-07-16 RX ORDER — HYDROCORTISONE ACETATE 0.5 %
AS DIRECTED CREAM (GRAM) TOPICAL
Status: ACTIVE | COMMUNITY

## 2020-07-16 RX ORDER — B-COMPLEX WITH VITAMIN C
1 TABLET TABLET ORAL ONCE A DAY
Status: ACTIVE | COMMUNITY

## 2020-07-16 RX ORDER — SPIRONOLACTONE 25 MG/1
1 TABLET TABLET ORAL ONCE A DAY
Status: ACTIVE | COMMUNITY

## 2020-07-16 RX ORDER — VORTIOXETINE 10 MG/1
1 TABLET TABLET, FILM COATED ORAL ONCE A DAY
Status: ACTIVE | COMMUNITY

## 2020-07-16 RX ORDER — DICYCLOMINE HYDROCHLORIDE 10 MG/1
1 CAPSULE CAPSULE ORAL
Qty: 30 | Refills: 1 | Status: ACTIVE | COMMUNITY
Start: 2020-07-06

## 2020-07-16 RX ORDER — COLESEVELAM HYDROCHLORIDE 625 MG/1
3 TABLETS WITH MEALS TABLET, FILM COATED ORAL TWICE A DAY
Qty: 180 | Refills: 0 | Status: ACTIVE | COMMUNITY
Start: 2020-06-15

## 2020-07-16 RX ORDER — LEVOMEFOLATE/ALGAL OIL 15-90.314
1 CAPSULE CAPSULE ORAL ONCE A DAY
Status: ACTIVE | COMMUNITY

## 2020-07-16 RX ORDER — ZOLPIDEM TARTRATE 5 MG/1
(SCHEDULE IV DRUG) TAKE ONE TABLET BY MOUTH AT BEDTIME TABLET, COATED ORAL
Qty: 30 UNSPECIFIED | Refills: 0 | Status: ACTIVE | COMMUNITY

## 2020-07-16 RX ORDER — HYDROXYCHLOROQUINE SULFATE 200 MG/1
1 TABLET WITH FOOD OR MILK TABLET ORAL TWICE A DAY
Status: ON HOLD | COMMUNITY

## 2020-07-16 RX ORDER — IBUPROFEN 800 MG/1
TAKE ONE TABLET BY MOUTH THREE TIMES A DAY TABLET, FILM COATED ORAL
Qty: 90 UNSPECIFIED | Refills: 0 | Status: ACTIVE | COMMUNITY

## 2020-07-16 RX ORDER — PRAVASTATIN SODIUM 20 MG/1
1 TABLET TABLET ORAL ONCE A DAY
Status: ACTIVE | COMMUNITY

## 2020-07-16 RX ORDER — LACTOBACILLUS RHAMNOSUS GG 10B CELL
1 CAPSULE CAPSULE ORAL ONCE A DAY
Status: ACTIVE | COMMUNITY

## 2020-07-16 RX ORDER — TRAMADOL HYDROCHLORIDE 50 MG/1
TABLET, FILM COATED ORAL TWICE DAILY
Status: ACTIVE | COMMUNITY

## 2020-07-16 RX ORDER — FIDAXOMICIN 200 MG/1
1 TABLET TABLET, FILM COATED ORAL TWICE A DAY
Qty: 10 TABLET | Status: ACTIVE | COMMUNITY
Start: 2020-06-24

## 2020-07-16 RX ORDER — FIDAXOMICIN 200 MG/1
1 TABLET TABLET, FILM COATED ORAL TWICE A DAY
Qty: 20 | Refills: 0 | Status: ACTIVE | COMMUNITY
Start: 2020-06-15

## 2020-07-16 RX ORDER — MESALAMINE 1000 MG/1
1 SUPPOSITORY SUPPOSITORY RECTAL BID
Qty: 60 | Refills: 1 | Status: ON HOLD | COMMUNITY
Start: 2018-07-13

## 2020-07-16 RX ORDER — NITROFURANTOIN MONOHYDRATE/MACROCRYSTALLINE 25; 75 MG/1; MG/1
1 CAPSULE AT BEDTIME WITH FOOD CAPSULE ORAL ONCE A DAY
Qty: 30 | Status: ON HOLD | COMMUNITY

## 2020-07-16 RX ORDER — BUPROPION HYDROCHLORIDE 300 MG/1
1 TABLET IN THE MORNING TABLET, EXTENDED RELEASE ORAL ONCE A DAY
Status: ACTIVE | COMMUNITY

## 2020-07-16 RX ORDER — CIPROFLOXACIN HCL 500 MG
1 TABLET TABLET ORAL
Qty: 20 | Refills: 0 | Status: ON HOLD | COMMUNITY
Start: 2020-03-12

## 2020-07-16 RX ORDER — METRONIDAZOLE 500 MG/1
1 TABLET TABLET, FILM COATED ORAL THREE TIMES A DAY
Qty: 30 | Refills: 0 | Status: ON HOLD | COMMUNITY
Start: 2020-03-12

## 2020-07-16 RX ORDER — GABAPENTIN 100 MG/1
1 CAPSULE CAPSULE ORAL ONCE A DAY
Status: ON HOLD | COMMUNITY

## 2020-07-16 RX ORDER — SULFAMETHOXAZOLE AND TRIMETHOPRIM 800; 160 MG/1; MG/1
1 TABLET TABLET ORAL TWICE A DAY
Qty: 20 | Refills: 0 | Status: ON HOLD | COMMUNITY
Start: 2020-03-13

## 2020-07-16 RX ORDER — ELUXADOLINE 75 MG/1
1 TABLET WITH FOOD TABLET, FILM COATED ORAL TWICE A DAY
Status: ON HOLD | COMMUNITY
Start: 2018-09-18

## 2020-07-16 RX ORDER — HYDROXYCHLOROQUINE SULFATE 200 MG/1
1 TABLET WITH FOOD OR MILK TABLET ORAL BID
Status: ACTIVE | COMMUNITY

## 2020-07-16 RX ORDER — RIZATRIPTAN BENZOATE 5 MG/1
1 TABLET ON THE TONGUE AND ALLOW TO DISSOLVE AS NEEDED ONE TIME TABLET, ORALLY DISINTEGRATING ORAL ONCE A DAY
Status: ON HOLD | COMMUNITY

## 2020-07-16 RX ORDER — CHOLESTYRAMINE 4 G/9G
1 PACKET MIXED WITH WATER OR NON-CARBONATED DRINK POWDER, FOR SUSPENSION ORAL TWICE A DAY
Qty: 60 | Refills: 1 | Status: ON HOLD | COMMUNITY
Start: 2020-06-11

## 2020-07-16 RX ORDER — HYOSCYAMINE SULFATE 0.125 MG
1 TABLET AS NEEDED TABLET ORAL
Qty: 60 TABLET | Refills: 1 | Status: ACTIVE | COMMUNITY
Start: 2018-06-27

## 2020-07-16 RX ORDER — NEBIVOLOL HYDROCHLORIDE 5 MG/1
1 TABLET TABLET ORAL ONCE A DAY
Status: ACTIVE | COMMUNITY

## 2020-07-16 RX ORDER — MESALAMINE 1000 MG/1
1 SUPPOSITORY AT BEDTIME SUPPOSITORY RECTAL ONCE A DAY
Qty: 30 | Status: ACTIVE | COMMUNITY
Start: 2020-05-11

## 2020-07-16 RX ORDER — MAGNESIUM OXIDE/MAG AA CHELATE 300 MG
1 CAPSULE WITH A MEAL CAPSULE ORAL ONCE A DAY
Status: ACTIVE | COMMUNITY

## 2020-07-16 RX ORDER — MULTIVIT-MIN/IRON/FOLIC ACID/K 18-600-40
AS DIRECTED CAPSULE ORAL
Status: ACTIVE | COMMUNITY

## 2020-07-16 RX ORDER — VANCOMYCIN HYDROCHLORIDE 125 MG/1
1 CAPSULE CAPSULE ORAL
Qty: 40 | Refills: 0 | Status: ON HOLD | COMMUNITY
Start: 2020-06-03

## 2020-07-16 RX ORDER — CHOLECALCIFEROL TAB 50 MCG (2000 UNIT) 50 MCG
1 TABLET TAB ORAL ONCE A DAY
Status: ACTIVE | COMMUNITY

## 2020-07-16 RX ORDER — MONTELUKAST SODIUM 10 MG/1
1 TABLET IN THE EVENING TABLET, FILM COATED ORAL ONCE A DAY
Status: ACTIVE | COMMUNITY

## 2020-07-16 RX ORDER — ALPRAZOLAM 2 MG/1
1 TABLET TABLET, EXTENDED RELEASE ORAL TWICE A DAY
Status: ACTIVE | COMMUNITY

## 2020-07-16 RX ORDER — ESOMEPRAZOLE MAGNESIUM 40 MG/1
1 CAPSULE CAPSULE, DELAYED RELEASE ORAL ONCE A DAY
Status: ON HOLD | COMMUNITY

## 2020-07-16 RX ORDER — HYOSCYAMINE SULFATE 0.12 MG/1
1 TABLET AS NEEDED TABLET ORAL
Qty: 40 TABLET | Refills: 1 | Status: ACTIVE | COMMUNITY
Start: 2020-03-03

## 2020-07-16 NOTE — HPI-MIGRATED HPI
;   ;   ;   ;   ;   ;   ;     Abdominal Pain :  Of note, patient contact the office on (7/6/2020) stating she completed the Dificid last week and continue to take Welchol 625 MG and hyoscyamine, but continue to have lower abdominal pain.   Pain is constant and described as a dull ache pain. She report the hyoscyamine is not as effective.    Symptoms start upon waking up in the mornings and persist throughout the day. Patient state "something's got to give".  Suvbsequently she was advised to  Stop Hyoscyamine and start Dicyclomine 10mg po qid prn     Last visit (06/24/2020)  Patient contact the office on (5/15/2020) c/o continue to experience abdominal pain and fecal incontinence 5+ times a day.   She admit lower abdominal pain below the umbilicus. Symptoms are present most morning upon waking up and will persist throughout the day.  She has been taking Hyoscyamine 2-3 times a day with temporary relief.       Last visit (5/15/2020) Patient presents today via televisit with consent for a follow up of increase LLQ abdominal pain.   Pain has been present constant and described as a dull ache. Denies any aggravating factors.   Admit some relief with the use of Hyoscyamine 1 BID.  She contact the office on (3/8/2020) c/o 5-6 loose BM's per day with fecal seepage and LLQ abdominal pain. Symptoms were improved then began to increase several weeks ago.  Denies melena, blood or mucus in stools. Denies pruritus ani, rectal pain or bleeding.   Abdominal pain is described as a dull ache that occur randomly throughout the day and will last for minutes to hours.  She ran out of the Hyoscyamine weeks ago.  Denies fever or chills.  Subsequently patient was informed to go to ER if LLQ pain is persistent and worse.      Last visit (3/31/2020) Patient presents today via tele visit to review CT, labs, stool study and follow up of LLQ abdominal pain associated with diverticulitis.  She admit improvement of symptoms with the use of Hyoscyamine and Ibuprofen 800 mg prn .  Last episode occurred yesterday.   Described as a sharp pain, that is typically present each day upon waking in the mornings.  She completed treatment for diverticulitis, Bactrim DS and Flagyl on (3/22/2020)   Of note, patient contact the office on (3/13/2020) with c/o 5-6 loose and watery BM's per day and episodes of fecal incontinence since 2 days after her last visit.  Described as full fecal seepage that occur with bending over or walking.   She state she has been treated with 3 antibiotics since Jan. 2020- Cefdnir x 10 days in Jan. 2020 for URI, then Cipro 500 mg 1 BID x 7 days on (2/3/2020), which she took for 3 days then told to d/c and start Moxifloxacin 100 mg x 10 days on (2/11/2020) due to the Diverticulitis.   Subsequently a stool study was ordered, treated with Flagyl 500 mg TID, Bactrim DS BID x 10 days, take Imodium prn with caution.  On (3/19/2020) patient continue to c/o LLQ abdominal pain upon waking up in the mornings. Pain is sharp in nature.  She will take Hyoscyamine, but it will take 1-2 hours to get relief.   Last visit (3/3/2020) Patient presents today for a follow up of abdominal pain. She denies any episodes of abdominal pain since her last office visit.   Patient had a CT Chest/ABD/Pelvis completed on 2/5/2020 showing mild diverticulitis of the proximal descending colon, with mild moderate inflammatory changes, including fat stranding and trace fluid. Moderate diffuse diverticulosis of the colon, more extensively involving the left colon. Groundglass infiltrate in the posterior right upper lobe, suspicious for possible pneumonia. Mild hepatic steatosis.   Patient admits that she was on abx and steroids due to a respiratory infection in December   Last visit (1/15/2019) Patient admits experiencing abdominal pain rarely since her last office visit.    10/16/18 Patient marks improvement of abdominal pain. She states that she has had 4-5 episodes since her last office visit 09/18/2018.  09/18/2018 Patient continues to have abdominal pain daily. She describes episodes as a cramping sensation throughout her lower abdomen. She typically begins to have symptoms after consuming a meal.  She is unsure if symptoms are improved with Dicyclomine 10 mg BID.   Of note, Patient called the office on 09/05/2018 complaining of abdominal cramping and  fecal incontinence. As a result we increased her Welchol 625mg from 2 tabs po bid to 2 tabs po tid.   08/22/2018 She admit improvement of symptoms with the use of Dicyclomine 10 mg BID.    Of note, patient contact the office (8/1/2018) stating the Hyoscyamine wasn't as effective, subsequently she was changed to Dicyclomine 10 mg po TID prn.  Last visit (7/18/2018) Patient continues to have abdominal discomfort. She admits the pain is infrequent and ranges from the left to right side of her abdomen. She continues to deny any aggravating factors.    Last visit (6/26/2018) Patient continues to have right sided abdominal pain accompanied with diarrhea daily. She describes episodes as a severe cramping sensation. She denies any aggravating factors.    04/24/2018 Continues to have right sided abdominal pain , intermittent severity , constant . No clear aggravating or alleviating factors     At last visit (10/31/2017) Admits abdominal pain that is located mid region and occur in an intermittent pattern.  Onset Dec. 2016 right after her hemorrhoidectomy and never got better.  Described as a dull ache that last for minutes to hours at a time.   Symptoms are mostly noticed upon waking up.  Admit relief of symptoms after defecation.     Patient denies any episodes of RUQ abdominal pain this year.  Symptoms were described as a dull aching to crampy and sharp pain.  Pain was initially intermittently, then  began to  worsen first of May. Patient notes as symptoms progressively worsen she went to [facility] ER 5/3/2016 and at that time her pain was severe, level 8 /10.  The Location of pain at the time she was seen in ER was RUQ abdomen and radiated to right side of her back, and middle of the back. Pain was worse with inhalation.  She had CT Abdomen, Chest Xray and was treated with IV fluids, Morphine with great relief of symptoms. Patient was discharged with Central 5/325 #12 and to follow up with GI, and PCP.  She denies any abdominal pain since the day after she discharged from [facility] ER.  Patient denies the use of antibiotics within the last (6) months. 	    Outside Labs:  (5/3/2016) Glucose 105 (H), Creatinine 1.16 (H), Protein Total 8.5 (H), ALT 36 (N), AST 20 (N), Albumin 4.5 (N), Total Bilirubin 0.6 (N), Total Calcium 10.6 (H), Alkaline Phosphatase 65 (N), Lipase 391 (N), WBC 7.2 (N), RBC 4.98 (N), Hemoglobin 15.4 (N), Hematocrit 44.4 (H), Platelet 345 (N).    Outside Imaging: (5/3/2016) CT Pelvis with impression: No acute intra-abdominal process. Colonic diverticulosis without diverticulitis. Normal appendix. No bowel obstruction. Persistent prominent appearance of the pancreatic duct measuring up to 3.9 mm in maximal diameter. Fatty liver with hepatomegaly.  (05/15/2015) CT Abdomen and Pelvis with contrast impression: Normal appendix. Descending and sigmoid colon diverticula. No diverticulitis. Borderline pancreatic ductal dilation. No biliary ductal dilation. Further evaluation with MRI of the abdomen and MRCP may be obtained.;   Fecal incontinence :   Last visit (06/24/2020) Continue to experience Fecal incontinence episodes daily associated with diarrhea episodes, 5+ times a day..  She has been wearing under pads due to symptoms.   Last visit (5/11/2020) Admit having fecal incontinence while sitting last week. She has a Cystocele/Rectocele since 2016 and report having Vaginal Pessary Incerted in (2019) performed by Dr. Edie Holden.   Last visit (3/31/2020) Continue to experience Fecal incontinence episodes daily associated with diarrhea episodes.  She has been wearing under pads due to symptoms.    Of note, patient contact the office on (3/13/2020) with c/o 5-6 loose and watery BM's per day and episodes of fecal incontinence since 2 days after her last visit.  Described as full fecal seepage that occur with bending over or walking.      Last visit (3/3/2020) Patient denies episodes of Fecal incontinence since last office visit.  Last visit (1/15/2019) Patient denies experiencing any fecal incontinence episodes since her last office visit.    10/16/18 Patient states that she has had improvement of symptoms. She has had less episodes since having a pessary device inserted.  09/18/2018 Patient continues to have episodes of fecal incontinence daily.   08/22/2018 She continue to experience fecal incontinence. Symptoms occur 1-2 times per day.  She continue to ware a pad in her under garment, because she does not feel the leakage.    Last visit (7/18/2018)Patient continues to have fecal incontinence.   Last visit (6/26/2018) Patient continues to have fecal incontinence. She states that she has seen Dr. Butt since her last visit. Of note, this was prior to her having diarrhea. At the time of her consultation he diagnosed her with cystocele and rectocele. In addition, they discussed her having a device placed in her back to help control her sphincter.   04/24/2018 Continues to have fecal incontinence and will be following up with Dr. ATUL Butt Continue to experience Fecal incontinence episodes daily associated with diarrhea episodes.  she has been wearing under pads due to symptoms.    Of note, patient contact the office on (3/13/2020) with c/o 5-6 loose and watery BM's per day and episodes of fecal incontinence since 2 days after her last visit.  Described as full fecal seepage that occur with bending over or walking.      Last visit (3/3/2020) Patient denies episodes of Fecal incontinence since last office visit.  Last visit (1/15/2019) Patient denies experiencing any fecal incontinence episodes since her last office visit.    10/16/18 Patient states that she has had improvement of symptoms. She has had less episodes since having a pessary device inserted.  09/18/2018 Patient continues to have episodes of fecal incontinence daily.   08/22/2018 She continue to experience fecal incontinence. Symptoms occur 1-2 times per day.  She continue to ware a pad in her under garment, because she does not feel the leakage.    Last visit (7/18/2018)Patient continues to have fecal incontinence.   Last visit (6/26/2018) Patient continues to have fecal incontinence. She states that she has seen Dr. Butt since her last visit. Of note, this was prior to her having diarrhea. At the time of her consultation he diagnosed her with cystocele and rectocele. In addition, they discussed her having a device placed in her back to help control her sphincter.   04/24/2018 Continues to have fecal incontinence and will be following up with Dr. ATUL Butt ;   Abnormal MRI : Visit (1/15/2019) Patient admits that she has had a heart test completed as well as a MRI of her neck completed in December and she has not yet received the results.  10/16/2018 Denie any recent imaging completed.  09/18/2018 No recent imaging completed.  04/24/2018 Patient presents today for a follow up of her pancreas  divisum which was diagnosed via MRI Abdomen May 29, 2015.  She notes of having low back pain in May 2015 and was seen by her PCP who subsequently ordered an MRI Abdomen w/wo contrast.  (5/29/2015) MR Abdomen w/wo contrast impression: Pancreas divisum with separate drainage of the pancreatic duct and common bile duct ar the duodenum. Mild, diffuse pancreatic ductal dilation without obstructing mass or calculus.    Last visit (1/15/2019) Patient admits that she has had a heart test completed as well as a MRI of her neck completed in December and she has not yet received the results.  10/16/18 No recent imaging completed.  09/18/2018 No recent imaging completed.  04/24/2018 Patient presents today for a follow up of her pancreas  divisum which was diagnosed via MRI Abdomen May 29, 2015.  She notes of having low back pain in May 2015 and was seen by her PCP who subsequently ordered an MRI Abdomen w/wo contrast.  (5/29/2015) MR Abdomen w/wo contrast impression: Pancreas divisum with separate drainage of the pancreatic duct and common bile duct ar the duodenum. Mild, diffuse pancreatic ductal dilation without obstructing mass or calculus. ;   Dysphagia :  She has a globus sensation since the past three days with regurgitation . Famotidine 20 mg po qhs 30-1     She continue to deny episodes of dysphagia Last visit (5/11/2020) She continue to deny episodes of dysphagia  Last visit (3/31/2020) She continue to deny episodes of dyspragia.  Last visit (3/3/2020) Patient denies episodes of dyspragia since last office visit.  Last visit (1/15/2019) Patient denies any episodes of dysphagia since her EGD with dilation was completed.   10/16/18 Patient continues to deny episodes of dysphagia.   09/18/2018 Patient continues to deny episodes of dysphagia.    08/22/2018 Continue to deny dysphagia since EGD w/dilation.    Last visit (7/18/2018) Patient denies recent episodes of dysphagia.  Last visit (6/26/2018) Patient denies recent episodes of dysphagia.  04/24/2018 Continues to have dysphagia with solid food , upper esophagus , few times a week    At last visit (10/31/2017)Difficulty in swallowing since the past year, few times a month associated with dryness of mouth due to Sjogren's She continue to deny episodes of dyspragia.  Last visit (3/3/2020) Patient denies episodes of dyspragia since last office visit.  Last visit (1/15/2019) Patient denies any episodes of dysphagia since her EGD with dilation was completed.   10/16/18 Patient continues to deny episodes of dysphagia.   09/18/2018 Patient continues to deny episodes of dysphagia.    08/22/2018 Continue to deny dysphagia since EGD w/dilation.    Last visit (7/18/2018) Patient denies recent episodes of dysphagia.  Last visit (6/26/2018) Patient denies recent episodes of dysphagia.  04/24/2018 Continues to have dysphagia with solid food , upper esophagus , few times a week    At last visit (10/31/2017)Difficulty in swallowing since the past year, few times a month associated with dryness of mouth due to Sjogren's    ;   Fatty Liver :    Last visit (3/31/2020) She continue to deny  jaundice, chills, RUQ pains, dizziness, or light headedness. Admit still feeling fatigue she is unsure if this has to do with her Liver or her Sjrogen's Disease.   Last visit (3/3/2020) Patient denies jaundice, chills, RUQ pains, dizziness, or light headedness. Admits still feeling fatigue she is unsure if this has to do with her Liver or her Sjrogen's Disease.  Last visit (01/15/2019) Patient currently denies jaundice, chills, RUQ pains, dizziness, or light headedness. Admits still feeling fatigue.    10/16/18 Patient was diagnosed with fatty liver in 2017.  She currently denies jaundice, chills, RUQ pains, dizziness, or light headedness Admit easy bruising over past few months, which is being follow by Hematologist Dr. Hakeem Fallon.  Admit several year history of feeling fatigue.    09/18/2018 Patient was diagnosed with fatty liver in 2017.  She currently denies jaundice, chills, RUQ pains, dizziness, or light headedness Admit easy bruising over past few months, which is being follow by Hematologist Dr. Hakeem Fallon.  Admit several year history of feeling fatigue.   08/22/2018 Patient was diagnosed with a fatty liver in 2017. Most recent labs with PCP Dr. Michelle Naidu a month ago due to skin rash from an allergic reaction from an unknown source. Labs completed (7/12/2018) revealed CBC and CMP within normal limits.   She currently denies jaundice, chills, RUQ pains, dizziness, or light headedness Admit easy bruising over past few months, which is being follow by Hematologist Dr. Hakeem Fallon.  Admit several year history of feeling fatigue.    04/24/2018 Patient was found to have fatty liver via CT abdomen on May 3, 2017.  Most recent labs with PCP Dr. Michelle Naidu  Aug. 23, 2017 at which time patient states her liver enzymes were fine, just had elevated calcium levels.  Patient currently denies jaundice, chills, RUQ pains, dizziness, or light headedness Admit easy bruising over past few months, which is being follow by Hematologist Dr. Hakeem Fallon.  Admit several year history of feeling fatigue.   Outside Labs:  (5/3/2016) Glucose 105 (H), Creatinine 1.16 (H), Protein Total 8.5 (H), ALT 36 (N), AST 20 (WNL), Albumin 4.5 (N), Total Bilirubin 0.6 (N), Total Calcium 10.6 (H), Alkaline Phosphatase 65 (N), Lipase 391 (N), WBC 7.2 (N), RBC 4.98 (N), Hemoglobin 15.4 (N), Hematocrit 44.4 (H), Platelet 345 (N).  (10/31/2017)NAFLD Score: -0.979   &lt; -1.455: predictor of absence of significant fibrosis (F0-F2 fibrosis) ? -1.455 to ? 0.675: indeterminate score > 0.675: predictor of presence of significant fibrosis (F3-F4 fibrosis)     Outside Imaging: (5/3/2016) CT Pelvis with impression: No acute intra-abdominal process. Colonic diverticulosis without diverticulitis. Normal appendix. No bowel obstruction. Persistent prominent appearance of the pancreatic duct measuring up to 3.9 mm in maximal diameter. Fatty liver with hepatomegaly.  (05/15/2015) CT Abdomen and Pelvis with contrast impression: Normal appendix. Descending and sigmoid colon diverticula. No diverticulitis. Borderline pancreatic ductal dilation. No biliary ductal dilation. Further evaluation with MRI of the abdomen and MRCP may be obtained. She continue to deny  jaundice, chills, RUQ pains, dizziness, or light headedness. Admit still feeling fatigue she is unsure if this has to do with her Liver or her Sjrogen's Disease.   Last visit (3/3/2020) Patient denies jaundice, chills, RUQ pains, dizziness, or light headedness. Admits still feeling fatigue she is unsure if this has to do with her Liver or her Sjrogen's Disease.  Last visit (01/15/2019) Patient currently denies jaundice, chills, RUQ pains, dizziness, or light headedness. Admits still feeling fatigue.    10/16/18 Patient was diagnosed with fatty liver in 2017.  She currently denies jaundice, chills, RUQ pains, dizziness, or light headedness Admit easy bruising over past few months, which is being follow by Hematologist Dr. Hakeem Fallon.  Admit several year history of feeling fatigue.    09/18/2018 Patient was diagnosed with fatty liver in 2017.  She currently denies jaundice, chills, RUQ pains, dizziness, or light headedness Admit easy bruising over past few months, which is being follow by Hematologist Dr. Hakeem Fallon.  Admit several year history of feeling fatigue.   08/22/2018 Patient was diagnosed with a fatty liver in 2017. Most recent labs with PCP Dr. Michelle Naidu a month ago due to skin rash from an allergic reaction from an unknown source. Labs completed (7/12/2018) revealed CBC and CMP within normal limits.   She currently denies jaundice, chills, RUQ pains, dizziness, or light headedness Admit easy bruising over past few months, which is being follow by Hematologist Dr. Hakeem Fallon.  Admit several year history of feeling fatigue.    04/24/2018 Patient was found to have fatty liver via CT abdomen on May 3, 2017.  Most recent labs with PCP Dr. Michelle Naidu  Aug. 23, 2017 at which time patient states her liver enzymes were fine, just had elevated calcium levels.  Patient currently denies jaundice, chills, RUQ pains, dizziness, or light headedness Admit easy bruising over past few months, which is being follow by Hematologist Dr. Hakeem Fallon.  Admit several year history of feeling fatigue.   Outside Labs:  (5/3/2016) Glucose 105 (H), Creatinine 1.16 (H), Protein Total 8.5 (H), ALT 36 (N), AST 20 (WNL), Albumin 4.5 (N), Total Bilirubin 0.6 (N), Total Calcium 10.6 (H), Alkaline Phosphatase 65 (N), Lipase 391 (N), WBC 7.2 (N), RBC 4.98 (N), Hemoglobin 15.4 (N), Hematocrit 44.4 (H), Platelet 345 (N).  (10/31/2017)NAFLD Score: -0.979   &lt; -1.455: predictor of absence of significant fibrosis (F0-F2 fibrosis) ? -1.455 to ? 0.675: indeterminate score > 0.675: predictor of presence of significant fibrosis (F3-F4 fibrosis)     Outside Imaging: (5/3/2016) CT Pelvis with impression: No acute intra-abdominal process. Colonic diverticulosis without diverticulitis. Normal appendix. No bowel obstruction. Persistent prominent appearance of the pancreatic duct measuring up to 3.9 mm in maximal diameter. Fatty liver with hepatomegaly.  (05/15/2015) CT Abdomen and Pelvis with contrast impression: Normal appendix. Descending and sigmoid colon diverticula. No diverticulitis. Borderline pancreatic ductal dilation. No biliary ductal dilation. Further evaluation with MRI of the abdomen and MRCP may be obtained. ;   Diarrhea : Patient presents today for a follow-up office visit from 06/24/2020.    Of note, patient contact the office on (7/6/2020) stating she completed the Dificid last week and continue to take Welchol 625 MG She denies diarrhea episodes over the past 2 weeks.  Stools are small semi-formed.  Denies straining. She want to discuss fecal transplant via pill form and Interstim.        Last visit (06/24/2020)  Patient presents today for a follow up of diarrhea, review MRI, and QDx results.  Patient was notified on (6/3/2020) QDx stool study was positive for C. Diff, subsequently she was treated with Vancomycin 125 mg po QID for 10 days.  Patient contact the office on (6/15/2020) c/o symptoms are no better. Also continue to experience abdominal pain and fecal incontinence 5+ times a day.    She also want to know if the cholestyramine come in a tablet form, because it say it can damage teeth, which she already has risk of dental damage from her Sjogren's.   Subsequently, patient was treated with Dificid 200 mg po BID for 10 days. Welchol 625 mg III po BID and Imodium - 1-2,  4 times a day prn.  She has not taken Imodium.   She admit bowel movements were getting back to normal last week over the course of 3 days while on a bland diet with having 3 normal formed BM's per day.  While off the diet she would have 3-5 BM's per day with a couple episodes fecal incontinence.  Denies melena, blood or mucus in stools.     Last visit (5/11/2020) Admit 5-6 loose BM's per day with fecal seepage and LLQ abdominal pain. Symptoms were improved then began to increase several weeks ago.  Denies melena, blood or mucus in stools.  Denies pruritus ani, rectal pain or bleeding.    She report maybe 1-2 days a week she will have normal and formed bowel movements.     Last visit (3/31/2020) Admit 5-6 bowel movements per day with 2-3 episodes of diarrhea at least once a day since last office visit.  Denies melena, blood or mucus in stools.  Of note, patient contact the office on (3/13/2020) with c/o 5-6 loose and watery BM's per day and episodes of fecal incontinence since 2 days after her last visit.  Described as full fecal seepage that occur with bending over or walking.    Subsequently a stool study was ordered, treated with Flagyl 500 mg TID, Bactrim DS BID x 10 days, take Imodium prn with caution.  She completed the 10 day course of Flagyl 500 mg TID, Bactrim DS BID on (3/22/2020).   Last visit (3/3/2020)She reports 2-3 bowel movements a day with no strain. She admits her stools are formed with no bleeding, melena, or mucus at this time.   Last visit (1/15/2019) Patient presents today for a follow up of diarrhea. Patient admits rarely experiencing diarrhea since her last office visit. Patient admits 1-3 bowel movements a day. Stools are normal in form.   Patient admits that she is scheduled to see Dr Holden tomorrow.       Patient denies the use of Welchol Tablet, 625 MG, 2 tablets prn with meals, Orally, TID. Patient states that she wanted to clear her body from the medications that she was taking.   Patient denies the use of Dicyclomine HCl Capsule, 10 MG, 1 tablet prn, Orally, TID. Patient states that she felt that this medication wasn't helping her so she stopped taking it.    10/16/18 Patient presents today for a follow up of diarrhea. She states there has been an improvement of symptoms and she admits to having 2-3 bowel movements per day. Ranging from solid to loose stools.   Patient was evaluated by Dr. Holden a Urogynecologist at [facility] who administered a pessary, which is a prosthetic device that can be inserted into the vagina to support its internal structure. She was advised this device tends to improve diarrhea and fecal incontinence in patients that have rectocele. Patient also has started Pelvic Floor Therapy and has completed two sessions.   Of note, patient never started Viberzi Tablet, 75 MG.   09/18/2018 Patient presents today for a follow up of diarrhea. She continues to have episodes of watery/loose stools daily. She states she can have up to 5-6 bowel movements per day. She denies blood, mucous or melena.    08/22/2018 Patient presents today to review QDx stool study, lab results and follow up of diarrhea. She has been taking Welchol 625 MG, 2 BID with some improvement in bowel habits.  Currently admit 2-3 defecations per day.  Stools are 50% of the time normal formed and loose/watery.  Denies melena, blood or mucus in stools.  Denies fecal urgency.  Continue to admit episodes of fecal incontinence.      Last visit (7/18/2018) Patient presents today for a follow up after her colonoscopy, EGD and labs.  She denies any complications after her procedure. She continues to have 3-4 bowel movements per day ranging from solid to watery/loose. She denies mucous or melena. She does admit to occasional bright red blood present on toilet paper following a bowel movement. She denies taking  Imodium A-D or Hyoscyamine.  Last visit (6/26/2018) Patient presents today for a consultation of diarrhea since the past 6 months . She states that symptoms started around June 9 th, 2018 after eating dinner. She was evaluated by [facility] on Hannah 10, 2018 for her symptoms. At that time stool studies were completed. She is currently having approximately five watery/loose stools per day. She denies any blood, mucous or melena. However, patient admits the amount of stools may increase if she consumes more food. She has been avoiding eating because that triggers her to have a bowel movement. She has had an eight pound unintentional weight loss since her last visit on 04/24/2018.    She did have an MRI of the abdomen completed on 04/06/2018 which revealed subtle sludge or concentrated bile within the gallbladder.  Per patient her last colonoscopy was 2-3 years ago. She denies having colon polyps at that time.  Of, note patient was prescribed Keflex on May 20, 2018. Admit 5-6 bowel movements per day with 2-3 episodes of diarrhea at least once a day since last office visit.  Denies melena, blood or mucus in stools.  Of note, patient contact the office on (3/13/2020) with c/o 5-6 loose and watery BM's per day and episodes of fecal incontinence since 2 days after her last visit.  Described as full fecal seepage that occur with bending over or walking.    Subsequently a stool study was ordered, treated with Flagyl 500 mg TID, Bactrim DS BID x 10 days, take Imodium prn with caution.  She completed the 10 day course of Flagyl 500 mg TID, Bactrim DS BID on (3/22/2020).   Last visit (3/3/2020)She reports 2-3 bowel movements a day with no strain. She admits her stools are formed with no bleeding, melena, or mucus at this time.   Last visit (1/15/2019) Patient presents today for a follow up of diarrhea. Patient admits rarely experiencing diarrhea since her last office visit. Patient admits 1-3 bowel movements a day. Stools are normal in form.   Patient admits that she is scheduled to see Dr Holden tomorrow.       Patient denies the use of Welchol Tablet, 625 MG, 2 tablets prn with meals, Orally, TID. Patient states that she wanted to clear her body from the medications that she was taking.   Patient denies the use of Dicyclomine HCl Capsule, 10 MG, 1 tablet prn, Orally, TID. Patient states that she felt that this medication wasn't helping her so she stopped taking it.    10/16/18 Patient presents today for a follow up of diarrhea. She states there has been an improvement of symptoms and she admits to having 2-3 bowel movements per day. Ranging from solid to loose stools.   Patient was evaluated by Dr. Holden a Urogynecologist at [facility] who administered a pessary, which is a prosthetic device that can be inserted into the vagina to support its internal structure. She was advised this device tends to improve diarrhea and fecal incontinence in patients that have rectocele. Patient also has started Pelvic Floor Therapy and has completed two sessions.   Of note, patient never started Viberzi Tablet, 75 MG.   09/18/2018 Patient presents today for a follow up of diarrhea. She continues to have episodes of watery/loose stools daily. She states she can have up to 5-6 bowel movements per day. She denies blood, mucous or melena.    08/22/2018 Patient presents today to review QDx stool study, lab results and follow up of diarrhea. She has been taking Welchol 625 MG, 2 BID with some improvement in bowel habits.  Currently admit 2-3 defecations per day.  Stools are 50% of the time normal formed and loose/watery.  Denies melena, blood or mucus in stools.  Denies fecal urgency.  Continue to admit episodes of fecal incontinence.      Last visit (7/18/2018) Patient presents today for a follow up after her colonoscopy, EGD and labs.  She denies any complications after her procedure. She continues to have 3-4 bowel movements per day ranging from solid to watery/loose. She denies mucous or melena. She does admit to occasional bright red blood present on toilet paper following a bowel movement. She denies taking  Imodium A-D or Hyoscyamine.  Last visit (6/26/2018) Patient presents today for a consultation of diarrhea since the past 6 months . She states that symptoms started around June 9 th, 2018 after eating dinner. She was evaluated by [facility] on Hannah 10, 2018 for her symptoms. At that time stool studies were completed. She is currently having approximately five watery/loose stools per day. She denies any blood, mucous or melena. However, patient admits the amount of stools may increase if she consumes more food. She has been avoiding eating because that triggers her to have a bowel movement. She has had an eight pound unintentional weight loss since her last visit on 04/24/2018.    She did have an MRI of the abdomen completed on 04/06/2018 which revealed subtle sludge or concentrated bile within the gallbladder.  Per patient her last colonoscopy was 2-3 years ago. She denies having colon polyps at that time.  Of, note patient was prescribed Keflex on May 20, 2018.;   Hospital Follow Up : Denies any hospitalization since last visit.  Last visit (3/31/2020) Denies any hospitalization since last visit.   Patient presented to [facility] ED June 22, 2019 with abdominal pain.  Onset 5 days prior to ER.  Described as a constant pain located right abdomen and flank area.  Denies any aggravating factors. Denies any hospitalization since.     Patient presented to [facility] ED June 22, 2019 with abdominal pain.  Onset 5 days prior to ER.  Described as a constant pain located right abdomen and flank area.  Denies any aggravating factors.;

## 2020-07-21 ENCOUNTER — OFFICE VISIT (OUTPATIENT)
Dept: URBAN - METROPOLITAN AREA CLINIC 35 | Facility: CLINIC | Age: 60
End: 2020-07-21

## 2020-07-22 ENCOUNTER — OFFICE VISIT (OUTPATIENT)
Dept: URBAN - METROPOLITAN AREA CLINIC 35 | Facility: CLINIC | Age: 60
End: 2020-07-22

## 2020-07-22 VITALS
SYSTOLIC BLOOD PRESSURE: 116 MMHG | DIASTOLIC BLOOD PRESSURE: 68 MMHG | WEIGHT: 144 LBS | HEIGHT: 64 IN | BODY MASS INDEX: 24.59 KG/M2

## 2020-07-22 RX ORDER — IBUPROFEN 800 MG/1
TAKE ONE TABLET BY MOUTH THREE TIMES A DAY TABLET, FILM COATED ORAL
Qty: 90 UNSPECIFIED | Refills: 0 | Status: ACTIVE | COMMUNITY

## 2020-07-22 RX ORDER — COLESEVELAM HYDROCHLORIDE 625 MG/1
3 TABLETS WITH MEALS TABLET, FILM COATED ORAL TWICE A DAY
Qty: 180 | Refills: 0 | Status: ACTIVE | COMMUNITY
Start: 2020-06-15

## 2020-07-22 RX ORDER — TRAMADOL HYDROCHLORIDE 50 MG/1
TABLET, FILM COATED ORAL TWICE DAILY
Status: ACTIVE | COMMUNITY

## 2020-07-22 RX ORDER — HYDROXYCHLOROQUINE SULFATE 200 MG/1
1 TABLET WITH FOOD OR MILK TABLET ORAL TWICE A DAY
Status: ON HOLD | COMMUNITY

## 2020-07-22 RX ORDER — LACTOBACILLUS RHAMNOSUS GG 10B CELL
1 CAPSULE CAPSULE ORAL ONCE A DAY
Status: ACTIVE | COMMUNITY

## 2020-07-22 RX ORDER — HYDROXYCHLOROQUINE SULFATE 200 MG/1
1 TABLET WITH FOOD OR MILK TABLET ORAL BID
Status: ACTIVE | COMMUNITY

## 2020-07-22 RX ORDER — NEBIVOLOL HYDROCHLORIDE 5 MG/1
1 TABLET TABLET ORAL ONCE A DAY
Status: ACTIVE | COMMUNITY

## 2020-07-22 RX ORDER — VANCOMYCIN HYDROCHLORIDE 125 MG/1
1 CAPSULE CAPSULE ORAL
Qty: 40 | Refills: 0 | Status: ON HOLD | COMMUNITY
Start: 2020-06-03

## 2020-07-22 RX ORDER — GABAPENTIN 100 MG/1
1 CAPSULE CAPSULE ORAL ONCE A DAY
Status: ON HOLD | COMMUNITY

## 2020-07-22 RX ORDER — FIDAXOMICIN 200 MG/1
1 TABLET TABLET, FILM COATED ORAL TWICE A DAY
Qty: 10 TABLET | Status: ACTIVE | COMMUNITY
Start: 2020-06-24

## 2020-07-22 RX ORDER — ZOLPIDEM TARTRATE 5 MG/1
(SCHEDULE IV DRUG) TAKE ONE TABLET BY MOUTH AT BEDTIME TABLET, COATED ORAL
Qty: 30 UNSPECIFIED | Refills: 0 | Status: ACTIVE | COMMUNITY

## 2020-07-22 RX ORDER — HYDROCORTISONE ACETATE 0.5 %
AS DIRECTED CREAM (GRAM) TOPICAL
Status: ACTIVE | COMMUNITY

## 2020-07-22 RX ORDER — MESALAMINE 1000 MG/1
1 SUPPOSITORY SUPPOSITORY RECTAL BID
Qty: 60 | Refills: 1 | Status: ON HOLD | COMMUNITY
Start: 2018-07-13

## 2020-07-22 RX ORDER — HYOSCYAMINE SULFATE 0.12 MG/1
1 TABLET AS NEEDED TABLET ORAL
Qty: 40 TABLET | Refills: 1 | Status: ACTIVE | COMMUNITY
Start: 2020-03-03

## 2020-07-22 RX ORDER — ESOMEPRAZOLE MAGNESIUM 40 MG/1
1 CAPSULE CAPSULE, DELAYED RELEASE ORAL ONCE A DAY
Status: ON HOLD | COMMUNITY

## 2020-07-22 RX ORDER — LEVOMEFOLATE/ALGAL OIL 15-90.314
1 CAPSULE CAPSULE ORAL ONCE A DAY
Status: ACTIVE | COMMUNITY

## 2020-07-22 RX ORDER — MONTELUKAST SODIUM 10 MG/1
1 TABLET IN THE EVENING TABLET, FILM COATED ORAL ONCE A DAY
Status: ACTIVE | COMMUNITY

## 2020-07-22 RX ORDER — CHOLESTYRAMINE 4 G/9G
1 PACKET MIXED WITH WATER OR NON-CARBONATED DRINK POWDER, FOR SUSPENSION ORAL TWICE A DAY
Qty: 60 | Refills: 1 | Status: ON HOLD | COMMUNITY
Start: 2020-06-11

## 2020-07-22 RX ORDER — HYOSCYAMINE SULFATE 0.125 MG
1 TABLET AS NEEDED TABLET ORAL
Qty: 60 TABLET | Refills: 1 | Status: ACTIVE | COMMUNITY
Start: 2018-06-27

## 2020-07-22 RX ORDER — SULFAMETHOXAZOLE AND TRIMETHOPRIM 800; 160 MG/1; MG/1
1 TABLET TABLET ORAL TWICE A DAY
Qty: 20 | Refills: 0 | Status: ON HOLD | COMMUNITY
Start: 2020-03-13

## 2020-07-22 RX ORDER — MAGNESIUM OXIDE/MAG AA CHELATE 300 MG
1 CAPSULE WITH A MEAL CAPSULE ORAL ONCE A DAY
Status: ACTIVE | COMMUNITY

## 2020-07-22 RX ORDER — MESALAMINE 1000 MG/1
1 SUPPOSITORY AT BEDTIME SUPPOSITORY RECTAL ONCE A DAY
Qty: 30 | Status: ACTIVE | COMMUNITY
Start: 2020-05-11

## 2020-07-22 RX ORDER — DICYCLOMINE HYDROCHLORIDE 10 MG/1
1 CAPSULE CAPSULE ORAL
Qty: 30 | Refills: 1 | Status: ACTIVE | COMMUNITY
Start: 2020-07-06

## 2020-07-22 RX ORDER — FIDAXOMICIN 200 MG/1
1 TABLET TABLET, FILM COATED ORAL TWICE A DAY
Qty: 20 | Refills: 0 | Status: ACTIVE | COMMUNITY
Start: 2020-06-15

## 2020-07-22 RX ORDER — ELUXADOLINE 75 MG/1
1 TABLET WITH FOOD TABLET, FILM COATED ORAL TWICE A DAY
Status: ON HOLD | COMMUNITY
Start: 2018-09-18

## 2020-07-22 RX ORDER — SPIRONOLACTONE 25 MG/1
1 TABLET TABLET ORAL ONCE A DAY
Status: ACTIVE | COMMUNITY

## 2020-07-22 RX ORDER — METRONIDAZOLE 500 MG/1
1 TABLET TABLET, FILM COATED ORAL THREE TIMES A DAY
Qty: 30 | Refills: 0 | Status: ON HOLD | COMMUNITY
Start: 2020-03-12

## 2020-07-22 RX ORDER — COLESEVELAM HYDROCHLORIDE 625 MG/1
3 TABLETS WITH MEALS TABLET, FILM COATED ORAL TWICE A DAY
OUTPATIENT
Start: 2020-06-15

## 2020-07-22 RX ORDER — CHOLECALCIFEROL TAB 50 MCG (2000 UNIT) 50 MCG
1 TABLET TAB ORAL ONCE A DAY
Status: ACTIVE | COMMUNITY

## 2020-07-22 RX ORDER — BUPROPION HYDROCHLORIDE 300 MG/1
1 TABLET IN THE MORNING TABLET, EXTENDED RELEASE ORAL ONCE A DAY
Status: ACTIVE | COMMUNITY

## 2020-07-22 RX ORDER — NITROFURANTOIN MONOHYDRATE/MACROCRYSTALLINE 25; 75 MG/1; MG/1
1 CAPSULE AT BEDTIME WITH FOOD CAPSULE ORAL ONCE A DAY
Qty: 30 | Status: ON HOLD | COMMUNITY

## 2020-07-22 RX ORDER — CIPROFLOXACIN HCL 500 MG
1 TABLET TABLET ORAL
Qty: 20 | Refills: 0 | Status: ON HOLD | COMMUNITY
Start: 2020-03-12

## 2020-07-22 RX ORDER — PRAVASTATIN SODIUM 20 MG/1
1 TABLET TABLET ORAL ONCE A DAY
Status: ACTIVE | COMMUNITY

## 2020-07-22 RX ORDER — B-COMPLEX WITH VITAMIN C
1 TABLET TABLET ORAL ONCE A DAY
Status: ACTIVE | COMMUNITY

## 2020-07-22 RX ORDER — RIZATRIPTAN BENZOATE 5 MG/1
1 TABLET ON THE TONGUE AND ALLOW TO DISSOLVE AS NEEDED ONE TIME TABLET, ORALLY DISINTEGRATING ORAL ONCE A DAY
Status: ON HOLD | COMMUNITY

## 2020-07-22 RX ORDER — ALPRAZOLAM 2 MG/1
1 TABLET TABLET, EXTENDED RELEASE ORAL TWICE A DAY
Status: ACTIVE | COMMUNITY

## 2020-07-22 RX ORDER — VORTIOXETINE 10 MG/1
1 TABLET TABLET, FILM COATED ORAL ONCE A DAY
Status: ACTIVE | COMMUNITY

## 2020-07-22 RX ORDER — MULTIVIT-MIN/IRON/FOLIC ACID/K 18-600-40
AS DIRECTED CAPSULE ORAL
Status: ACTIVE | COMMUNITY

## 2020-07-22 NOTE — HPI-MIGRATED HPI
;   ;   ;   ;   ;   ;   ;     Abdominal Pain : Patient presents today for a follow up of abdominal pain , lower and mid - abdomen - months.  Movement makes it worse but no clear aggravating or alleviating factors    Of note, patient contact the office on (7/6/2020) stating she completed the Dificid last week and continue to take Welchol 625 MG and hyoscyamine, but continue to have lower abdominal pain.  Pain is constant and described as a cramping pain. She report the hyoscyamine is not as effective.    Symptoms start upon waking up in the mornings and persist throughout the day. Patient state "something's got to give".  Subsequently she was advised to  Stop Hyoscyamine and start Dicyclomine 10 mg po QID prn   Patient reports Dicyclomine 10 mg po QID prn is not effective.    Last visit (06/24/2020)  Patient contact the office on (5/15/2020) c/o continue to experience abdominal pain and fecal incontinence 5+ times a day.   She admit lower abdominal pain below the umbilicus. Symptoms are present most morning upon waking up and will persist throughout the day.  She has been taking Hyoscyamine 2-3 times a day with temporary relief.       Last visit (5/15/2020) Patient presents today via televisit with consent for a follow up of increase LLQ abdominal pain.   Pain has been present constant and described as a dull ache. Denies any aggravating factors.   Admit some relief with the use of Hyoscyamine 1 BID.  She contact the office on (3/8/2020) c/o 5-6 loose BM's per day with fecal seepage and LLQ abdominal pain. Symptoms were improved then began to increase several weeks ago.  Denies melena, blood or mucus in stools. Denies pruritus ani, rectal pain or bleeding.   Abdominal pain is described as a dull ache that occur randomly throughout the day and will last for minutes to hours.  She ran out of the Hyoscyamine weeks ago.  Denies fever or chills.  Subsequently patient was informed to go to ER if LLQ pain is persistent and worse.      Last visit (3/31/2020) Patient presents today via tele visit to review CT, labs, stool study and follow up of LLQ abdominal pain associated with diverticulitis.  She admit improvement of symptoms with the use of Hyoscyamine and Ibuprofen 800 mg prn .  Last episode occurred yesterday.   Described as a sharp pain, that is typically present each day upon waking in the mornings.  She completed treatment for diverticulitis, Bactrim DS and Flagyl on (3/22/2020)   Of note, patient contact the office on (3/13/2020) with c/o 5-6 loose and watery BM's per day and episodes of fecal incontinence since 2 days after her last visit.  Described as full fecal seepage that occur with bending over or walking.   She state she has been treated with 3 antibiotics since Jan. 2020- Cefdnir x 10 days in Jan. 2020 for URI, then Cipro 500 mg 1 BID x 7 days on (2/3/2020), which she took for 3 days then told to d/c and start Moxifloxacin 100 mg x 10 days on (2/11/2020) due to the Diverticulitis.   Subsequently a stool study was ordered, treated with Flagyl 500 mg TID, Bactrim DS BID x 10 days, take Imodium prn with caution.  On (3/19/2020) patient continue to c/o LLQ abdominal pain upon waking up in the mornings. Pain is sharp in nature.  She will take Hyoscyamine, but it will take 1-2 hours to get relief.   Last visit (3/3/2020) Patient presents today for a follow up of abdominal pain. She denies any episodes of abdominal pain since her last office visit.   Patient had a CT Chest/ABD/Pelvis completed on 2/5/2020 showing mild diverticulitis of the proximal descending colon, with mild moderate inflammatory changes, including fat stranding and trace fluid. Moderate diffuse diverticulosis of the colon, more extensively involving the left colon. Groundglass infiltrate in the posterior right upper lobe, suspicious for possible pneumonia. Mild hepatic steatosis.   Patient admits that she was on abx and steroids due to a respiratory infection in December   Last visit (1/15/2019) Patient admits experiencing abdominal pain rarely since her last office visit.    10/16/18 Patient marks improvement of abdominal pain. She states that she has had 4-5 episodes since her last office visit 09/18/2018.  09/18/2018 Patient continues to have abdominal pain daily. She describes episodes as a cramping sensation throughout her lower abdomen. She typically begins to have symptoms after consuming a meal.  She is unsure if symptoms are improved with Dicyclomine 10 mg BID.   Of note, Patient called the office on 09/05/2018 complaining of abdominal cramping and  fecal incontinence. As a result we increased her Welchol 625mg from 2 tabs po bid to 2 tabs po tid.   08/22/2018 She admit improvement of symptoms with the use of Dicyclomine 10 mg BID.    Of note, patient contact the office (8/1/2018) stating the Hyoscyamine wasn't as effective, subsequently she was changed to Dicyclomine 10 mg po TID prn.  Last visit (7/18/2018) Patient continues to have abdominal discomfort. She admits the pain is infrequent and ranges from the left to right side of her abdomen. She continues to deny any aggravating factors.    Last visit (6/26/2018) Patient continues to have right sided abdominal pain accompanied with diarrhea daily. She describes episodes as a severe cramping sensation. She denies any aggravating factors.    04/24/2018 Continues to have right sided abdominal pain , intermittent severity , constant . No clear aggravating or alleviating factors     At last visit (10/31/2017) Admits abdominal pain that is located mid region and occur in an intermittent pattern.  Onset Dec. 2016 right after her hemorrhoidectomy and never got better.  Described as a dull ache that last for minutes to hours at a time.   Symptoms are mostly noticed upon waking up.  Admit relief of symptoms after defecation.     Patient denies any episodes of RUQ abdominal pain this year.  Symptoms were described as a dull aching to crampy and sharp pain.  Pain was initially intermittently, then  began to  worsen first of May. Patient notes as symptoms progressively worsen [facility] and at that time her pain was severe, level 8 /10.  The Location of pain at the time she was seen in ER was RUQ abdomen and radiated to right side of her back, and middle of the back. Pain was worse with inhalation.  She had CT Abdomen, Chest Xray and was treated with IV fluids, Morphine with great relief of symptoms. Patient was discharged with [place] 5/325 #12 and to follow up with GI, and PCP.  She denies any abdominal pain since the day after she [facility].  Patient denies the use of antibiotics within the last (6) months. 	    Outside Labs:  (5/3/2016) Glucose 105 (H), Creatinine 1.16 (H), Protein Total 8.5 (H), ALT 36 (N), AST 20 (N), Albumin 4.5 (N), Total Bilirubin 0.6 (N), Total Calcium 10.6 (H), Alkaline Phosphatase 65 (N), Lipase 391 (N), WBC 7.2 (N), RBC 4.98 (N), Hemoglobin 15.4 (N), Hematocrit 44.4 (H), Platelet 345 (N).    Outside Imaging: (5/3/2016) CT Pelvis with impression: No acute intra-abdominal process. Colonic diverticulosis without diverticulitis. Normal appendix. No bowel obstruction. Persistent prominent appearance of the pancreatic duct measuring up to 3.9 mm in maximal diameter. Fatty liver with hepatomegaly.  (05/15/2015) CT Abdomen and Pelvis with contrast impression: Normal appendix. Descending and sigmoid colon diverticula. No diverticulitis. Borderline pancreatic ductal dilation. No biliary ductal dilation. Further evaluation with MRI of the abdomen and MRCP may be obtained.;   Fecal incontinence : Continue to experience Fecal incontinence episodes daily.   Symptoms are associated with diarrhea episodes, at least 5-6 times a day.  Last visit (06/24/2020) Continue to experience Fecal incontinence episodes daily associated with diarrhea episodes, 5+ times a day..  She has been wearing under pads due to symptoms.   Last visit (5/11/2020) Admit having fecal incontinence while sitting last week. She has a Cystocele/Rectocele since 2016 and report having Vaginal Pessary Incerted in (2019) performed by Dr. Edie Holden.   Last visit (3/31/2020) Continue to experience Fecal incontinence episodes daily associated with diarrhea episodes.  She has been wearing under pads due to symptoms.    Of note, patient contact the office on (3/13/2020) with c/o 5-6 loose and watery BM's per day and episodes of fecal incontinence since 2 days after her last visit.  Described as full fecal seepage that occur with bending over or walking.      Last visit (3/3/2020) Patient denies episodes of Fecal incontinence since last office visit.  Last visit (1/15/2019) Patient denies experiencing any fecal incontinence episodes since her last office visit.    10/16/18 Patient states that she has had improvement of symptoms. She has had less episodes since having a pessary device inserted.  09/18/2018 Patient continues to have episodes of fecal incontinence daily.   08/22/2018 She continue to experience fecal incontinence. Symptoms occur 1-2 times per day.  She continue to ware a pad in her under garment, because she does not feel the leakage.    Last visit (7/18/2018)Patient continues to have fecal incontinence.   Last visit (6/26/2018) Patient continues to have fecal incontinence. She states that she has seen Dr. Butt since her last visit. Of note, this was prior to her having diarrhea. At the time of her consultation he diagnosed her with cystocele and rectocele. In addition, they discussed her having a device placed in her back to help control her sphincter.   04/24/2018 Continues to have fecal incontinence and will be following up with Dr. ATUL Butt Continue to experience Fecal incontinence episodes daily associated with diarrhea episodes.  she has been wearing under pads due to symptoms.    Of note, patient contact the office on (3/13/2020) with c/o 5-6 loose and watery BM's per day and episodes of fecal incontinence since 2 days after her last visit.  Described as full fecal seepage that occur with bending over or walking.      Last visit (3/3/2020) Patient denies episodes of Fecal incontinence since last office visit.  Last visit (1/15/2019) Patient denies experiencing any fecal incontinence episodes since her last office visit.    10/16/18 Patient states that she has had improvement of symptoms. She has had less episodes since having a pessary device inserted.  09/18/2018 Patient continues to have episodes of fecal incontinence daily.   08/22/2018 She continue to experience fecal incontinence. Symptoms occur 1-2 times per day.  She continue to ware a pad in her under garment, because she does not feel the leakage.    Last visit (7/18/2018)Patient continues to have fecal incontinence.   Last visit (6/26/2018) Patient continues to have fecal incontinence. She states that she has seen Dr. Butt since her last visit. Of note, this was prior to her having diarrhea. At the time of her consultation he diagnosed her with cystocele and rectocele. In addition, they discussed her having a device placed in her back to help control her sphincter.   04/24/2018 Continues to have fecal incontinence and will be following up with Dr. ATUL Butt ;   Abnormal MRI : Visit (1/15/2019) Patient admits that she has had a heart test completed as well as a MRI of her neck completed in December and she has not yet received the results.  10/16/2018 Denie any recent imaging completed.  09/18/2018 No recent imaging completed.  04/24/2018 Patient presents today for a follow up of her pancreas  divisum which was diagnosed via MRI Abdomen May 29, 2015.  She notes of having low back pain in May 2015 and was seen by her PCP who subsequently ordered an MRI Abdomen w/wo contrast.  (5/29/2015) MR Abdomen w/wo contrast impression: Pancreas divisum with separate drainage of the pancreatic duct and common bile duct ar the duodenum. Mild, diffuse pancreatic ductal dilation without obstructing mass or calculus.    Last visit (1/15/2019) Patient admits that she has had a heart test completed as well as a MRI of her neck completed in December and she has not yet received the results.  10/16/18 No recent imaging completed.  09/18/2018 No recent imaging completed.  04/24/2018 Patient presents today for a follow up of her pancreas  divisum which was diagnosed via MRI Abdomen May 29, 2015.  She notes of having low back pain in May 2015 and was seen by her PCP who subsequently ordered an MRI Abdomen w/wo contrast.  (5/29/2015) MR Abdomen w/wo contrast impression: Pancreas divisum with separate drainage of the pancreatic duct and common bile duct ar the duodenum. Mild, diffuse pancreatic ductal dilation without obstructing mass or calculus. ;   Dysphagia : She has a globus sensation since the past three days with regurgitation .   Patient can not recall starting. in her words ( It doesn't matter now ) Famotidine 20 mg po QHS 30-1    She continue to deny episodes of dysphagia Last visit (5/11/2020) She continue to deny episodes of dysphagia  Last visit (3/31/2020) She continue to deny episodes of dyspragia.  Last visit (3/3/2020) Patient denies episodes of dyspragia since last office visit.  Last visit (1/15/2019) Patient denies any episodes of dysphagia since her EGD with dilation was completed.   10/16/18 Patient continues to deny episodes of dysphagia.   09/18/2018 Patient continues to deny episodes of dysphagia.    08/22/2018 Continue to deny dysphagia since EGD w/dilation.    Last visit (7/18/2018) Patient denies recent episodes of dysphagia.  Last visit (6/26/2018) Patient denies recent episodes of dysphagia.  04/24/2018 Continues to have dysphagia with solid food , upper esophagus , few times a week    At last visit (10/31/2017)Difficulty in swallowing since the past year, few times a month associated with dryness of mouth due to Sjogren's She continue to deny episodes of dyspragia.  Last visit (3/3/2020) Patient denies episodes of dyspragia since last office visit.  Last visit (1/15/2019) Patient denies any episodes of dysphagia since her EGD with dilation was completed.   10/16/18 Patient continues to deny episodes of dysphagia.   09/18/2018 Patient continues to deny episodes of dysphagia.    08/22/2018 Continue to deny dysphagia since EGD w/dilation.    Last visit (7/18/2018) Patient denies recent episodes of dysphagia.  Last visit (6/26/2018) Patient denies recent episodes of dysphagia.  04/24/2018 Continues to have dysphagia with solid food , upper esophagus , few times a week    At last visit (10/31/2017)Difficulty in swallowing since the past year, few times a month associated with dryness of mouth due to Sjogren's    ;   Fatty Liver : She continue to deny  jaundice, chills, RUQ pains, dizziness, or light headedness   Last visit (3/31/2020) She continue to deny  jaundice, chills, RUQ pains, dizziness, or light headedness. Admit still feeling fatigue she is unsure if this has to do with her Liver or her Sjogren's Disease.   Last visit (3/3/2020) Patient denies jaundice, chills, RUQ pains, dizziness, or light headedness. Admits still feeling fatigue she is unsure if this has to do with her Liver or her Sjrogen's Disease.  Last visit (01/15/2019) Patient currently denies jaundice, chills, RUQ pains, dizziness, or light headedness. Admits still feeling fatigue.    10/16/18 Patient was diagnosed with fatty liver in 2017.  She currently denies jaundice, chills, RUQ pains, dizziness, or light headedness Admit easy bruising over past few months, which is being follow by Hematologist Dr. Hakeem Fallon.  Admit several year history of feeling fatigue.    09/18/2018 Patient was diagnosed with fatty liver in 2017.  She currently denies jaundice, chills, RUQ pains, dizziness, or light headedness Admit easy bruising over past few months, which is being follow by Hematologist Dr. Hakeem Fallon.  Admit several year history of feeling fatigue.   08/22/2018 Patient was diagnosed with a fatty liver in 2017. Most recent labs with PCP Dr. Michelle Naidu a month ago due to skin rash from an allergic reaction from an unknown source. Labs completed (7/12/2018) revealed CBC and CMP within normal limits.   She currently denies jaundice, chills, RUQ pains, dizziness, or light headedness Admit easy bruising over past few months, which is being follow by Hematologist Dr. Hakeem Fallon.  Admit several year history of feeling fatigue.    04/24/2018 Patient was found to have fatty liver via CT abdomen on May 3, 2017.  Most recent labs with PCP Dr. Michelle Naidu  Aug. 23, 2017 at which time patient states her liver enzymes were fine, just had elevated calcium levels.  Patient currently denies jaundice, chills, RUQ pains, dizziness, or light headedness Admit easy bruising over past few months, which is being follow by Hematologist Dr. Hakeem Fallon.  Admit several year history of feeling fatigue.   Outside Labs:  (5/3/2016) Glucose 105 (H), Creatinine 1.16 (H), Protein Total 8.5 (H), ALT 36 (N), AST 20 (WNL), Albumin 4.5 (N), Total Bilirubin 0.6 (N), Total Calcium 10.6 (H), Alkaline Phosphatase 65 (N), Lipase 391 (N), WBC 7.2 (N), RBC 4.98 (N), Hemoglobin 15.4 (N), Hematocrit 44.4 (H), Platelet 345 (N).  (10/31/2017)NAFLD Score: -0.979   &lt; -1.455: predictor of absence of significant fibrosis (F0-F2 fibrosis) ? -1.455 to ? 0.675: indeterminate score > 0.675: predictor of presence of significant fibrosis (F3-F4 fibrosis)     Outside Imaging: (5/3/2016) CT Pelvis with impression: No acute intra-abdominal process. Colonic diverticulosis without diverticulitis. Normal appendix. No bowel obstruction. Persistent prominent appearance of the pancreatic duct measuring up to 3.9 mm in maximal diameter. Fatty liver with hepatomegaly.  (05/15/2015) CT Abdomen and Pelvis with contrast impression: Normal appendix. Descending and sigmoid colon diverticula. No diverticulitis. Borderline pancreatic ductal dilation. No biliary ductal dilation. Further evaluation with MRI of the abdomen and MRCP may be obtained. She continue to deny  jaundice, chills, RUQ pains, dizziness, or light headedness. Admit still feeling fatigue she is unsure if this has to do with her Liver or her Sjrogen's Disease.   Last visit (3/3/2020) Patient denies jaundice, chills, RUQ pains, dizziness, or light headedness. Admits still feeling fatigue she is unsure if this has to do with her Liver or her Sjrogen's Disease.  Last visit (01/15/2019) Patient currently denies jaundice, chills, RUQ pains, dizziness, or light headedness. Admits still feeling fatigue.    10/16/18 Patient was diagnosed with fatty liver in 2017.  She currently denies jaundice, chills, RUQ pains, dizziness, or light headedness Admit easy bruising over past few months, which is being follow by Hematologist Dr. Hakeem Fallon.  Admit several year history of feeling fatigue.    09/18/2018 Patient was diagnosed with fatty liver in 2017.  She currently denies jaundice, chills, RUQ pains, dizziness, or light headedness Admit easy bruising over past few months, which is being follow by Hematologist Dr. Hakeem Fallon.  Admit several year history of feeling fatigue.   08/22/2018 Patient was diagnosed with a fatty liver in 2017. Most recent labs with PCP Dr. Michelle Naidu a month ago due to skin rash from an allergic reaction from an unknown source. Labs completed (7/12/2018) revealed CBC and CMP within normal limits.   She currently denies jaundice, chills, RUQ pains, dizziness, or light headedness Admit easy bruising over past few months, which is being follow by Hematologist Dr. Hakeem Fallon.  Admit several year history of feeling fatigue.    04/24/2018 Patient was found to have fatty liver via CT abdomen on May 3, 2017.  Most recent labs with PCP Dr. Michelle Naidu  Aug. 23, 2017 at which time patient states her liver enzymes were fine, just had elevated calcium levels.  Patient currently denies jaundice, chills, RUQ pains, dizziness, or light headedness Admit easy bruising over past few months, which is being follow by Hematologist Dr. Hakeem Fallon.  Admit several year history of feeling fatigue.   Outside Labs:  (5/3/2016) Glucose 105 (H), Creatinine 1.16 (H), Protein Total 8.5 (H), ALT 36 (N), AST 20 (WNL), Albumin 4.5 (N), Total Bilirubin 0.6 (N), Total Calcium 10.6 (H), Alkaline Phosphatase 65 (N), Lipase 391 (N), WBC 7.2 (N), RBC 4.98 (N), Hemoglobin 15.4 (N), Hematocrit 44.4 (H), Platelet 345 (N).  (10/31/2017)NAFLD Score: -0.979   &lt; -1.455: predictor of absence of significant fibrosis (F0-F2 fibrosis) ? -1.455 to ? 0.675: indeterminate score > 0.675: predictor of presence of significant fibrosis (F3-F4 fibrosis)     Outside Imaging: (5/3/2016) CT Pelvis with impression: No acute intra-abdominal process. Colonic diverticulosis without diverticulitis. Normal appendix. No bowel obstruction. Persistent prominent appearance of the pancreatic duct measuring up to 3.9 mm in maximal diameter. Fatty liver with hepatomegaly.  (05/15/2015) CT Abdomen and Pelvis with contrast impression: Normal appendix. Descending and sigmoid colon diverticula. No diverticulitis. Borderline pancreatic ductal dilation. No biliary ductal dilation. Further evaluation with MRI of the abdomen and MRCP may be obtained. ;   Diarrhea : Patient admits to 5-6 bowel episodes of diarrhea a day.   Of note, patient contact the office on (7/6/2020) stating she completed the Dificid last week and continue to take Welchol 625 MG She denies diarrhea episodes over the past 2 weeks.  Stools are small semi-formed.  Denies straining. She want to discuss fecal transplant via pill form and Interstim.        Last visit (06/24/2020)  Patient presents today for a follow up of diarrhea, review MRI, and QDx results.  Patient was notified on (6/3/2020) QDx stool study was positive for C. Diff, subsequently she was treated with Vancomycin 125 mg po QID for 10 days.  Patient contact the office on (6/15/2020) c/o symptoms are no better. Also continue to experience abdominal pain and fecal incontinence 5+ times a day.    She also want to know if the cholestyramine come in a tablet form, because it say it can damage teeth, which she already has risk of dental damage from her Sjogren's.   Subsequently, patient was treated with Dificid 200 mg po BID for 10 days. Welchol 625 mg III po BID and Imodium - 1-2,  4 times a day prn.  She has not taken Imodium.   She admit bowel movements were getting back to normal last week over the course of 3 days while on a bland diet with having 3 normal formed BM's per day.  While off the diet she would have 3-5 BM's per day with a couple episodes fecal incontinence.  Denies melena, blood or mucus in stools.     Last visit (5/11/2020) Admit 5-6 loose BM's per day with fecal seepage and LLQ abdominal pain. Symptoms were improved then began to increase several weeks ago.  Denies melena, blood or mucus in stools.  Denies pruritus ani, rectal pain or bleeding.    She report maybe 1-2 days a week she will have normal and formed bowel movements.     Last visit (3/31/2020) Admit 5-6 bowel movements per day with 2-3 episodes of diarrhea at least once a day since last office visit.  Denies melena, blood or mucus in stools.  Of note, patient contact the office on (3/13/2020) with c/o 5-6 loose and watery BM's per day and episodes of fecal incontinence since 2 days after her last visit.  Described as full fecal seepage that occur with bending over or walking.    Subsequently a stool study was ordered, treated with Flagyl 500 mg TID, Bactrim DS BID x 10 days, take Imodium prn with caution.  She completed the 10 day course of Flagyl 500 mg TID, Bactrim DS BID on (3/22/2020).   Last visit (3/3/2020)She reports 2-3 bowel movements a day with no strain. She admits her stools are formed with no bleeding, melena, or mucus at this time.   Last visit (1/15/2019) Patient presents today for a follow up of diarrhea. Patient admits rarely experiencing diarrhea since her last office visit. Patient admits 1-3 bowel movements a day. Stools are normal in form.   Patient admits that she is scheduled to see Dr Holden tomorrow.       Patient denies the use of Welchol Tablet, 625 MG, 2 tablets prn with meals, Orally, TID. Patient states that she wanted to clear her body from the medications that she was taking.   Patient denies the use of Dicyclomine HCl Capsule, 10 MG, 1 tablet prn, Orally, TID. Patient states that she felt that this medication wasn't helping her so she stopped taking it.    10/16/18 Patient presents today for a follow up of diarrhea. She states there has been an improvement of symptoms and she admits to having 2-3 bowel movements per day. Ranging from solid to loose stools.   Patient was evaluated by Dr. Holden a Urogynecologist at [facility] who administered a pessary, which is a prosthetic device that can be inserted into the vagina to support its internal structure. She was advised this device tends to improve diarrhea and fecal incontinence in patients that have rectocele. Patient also has started Pelvic Floor Therapy and has completed two sessions.   Of note, patient never started Viberzi Tablet, 75 MG.   09/18/2018 Patient presents today for a follow up of diarrhea. She continues to have episodes of watery/loose stools daily. She states she can have up to 5-6 bowel movements per day. She denies blood, mucous or melena.    08/22/2018 Patient presents today to review QDx stool study, lab results and follow up of diarrhea. She has been taking Welchol 625 MG, 2 BID with some improvement in bowel habits.  Currently admit 2-3 defecations per day.  Stools are 50% of the time normal formed and loose/watery.  Denies melena, blood or mucus in stools.  Denies fecal urgency.  Continue to admit episodes of fecal incontinence.      Last visit (7/18/2018) Patient presents today for a follow up after her colonoscopy, EGD and labs.  She denies any complications after her procedure. She continues to have 3-4 bowel movements per day ranging from solid to watery/loose. She denies mucous or melena. She does admit to occasional bright red blood present on toilet paper following a bowel movement. She denies taking  Imodium A-D or Hyoscyamine.  Last visit (6/26/2018) Patient presents today for a consultation of diarrhea since the past 6 months . She states that symptoms started around June 9 th, 2018 after eating dinner. She was evaluated by [facility] on Hannah 10, 2018 for her symptoms. At that time stool studies were completed. She is currently having approximately five watery/loose stools per day. She denies any blood, mucous or melena. However, patient admits the amount of stools may increase if she consumes more food. She has been avoiding eating because that triggers her to have a bowel movement. She has had an eight pound unintentional weight loss since her last visit on 04/24/2018.    She did have an MRI of the abdomen completed on 04/06/2018 which revealed subtle sludge or concentrated bile within the gallbladder.  Per patient her last colonoscopy was 2-3 years ago. She denies having colon polyps at that time.  Of, note patient was prescribed Keflex on May 20, 2018. Admit 5-6 bowel movements per day with 2-3 episodes of diarrhea at least once a day since last office visit.  Denies melena, blood or mucus in stools.  Of note, patient contact the office on (3/13/2020) with c/o 5-6 loose and watery BM's per day and episodes of fecal incontinence since 2 days after her last visit.  Described as full fecal seepage that occur with bending over or walking.    Subsequently a stool study was ordered, treated with Flagyl 500 mg TID, Bactrim DS BID x 10 days, take Imodium prn with caution.  She completed the 10 day course of Flagyl 500 mg TID, Bactrim DS BID on (3/22/2020).   Last visit (3/3/2020)She reports 2-3 bowel movements a day with no strain. She admits her stools are formed with no bleeding, melena, or mucus at this time.   Last visit (1/15/2019) Patient presents today for a follow up of diarrhea. Patient admits rarely experiencing diarrhea since her last office visit. Patient admits 1-3 bowel movements a day. Stools are normal in form.   Patient admits that she is scheduled to see Dr Holden tomorrow.       Patient denies the use of Welchol Tablet, 625 MG, 2 tablets prn with meals, Orally, TID. Patient states that she wanted to clear her body from the medications that she was taking.   Patient denies the use of Dicyclomine HCl Capsule, 10 MG, 1 tablet prn, Orally, TID. Patient states that she felt that this medication wasn't helping her so she stopped taking it.    10/16/18 Patient presents today for a follow up of diarrhea. She states there has been an improvement of symptoms and she admits to having 2-3 bowel movements per day. Ranging from solid to loose stools.   Patient was evaluated by Dr. Holden a Urogynecologist at [facility] who administered a pessary, which is a prosthetic device that can be inserted into the vagina to support its internal structure. She was advised this device tends to improve diarrhea and fecal incontinence in patients that have rectocele. Patient also has started Pelvic Floor Therapy and has completed two sessions.   Of note, patient never started Viberzi Tablet, 75 MG.   09/18/2018 Patient presents today for a follow up of diarrhea. She continues to have episodes of watery/loose stools daily. She states she can have up to 5-6 bowel movements per day. She denies blood, mucous or melena.    08/22/2018 Patient presents today to review QDx stool study, lab results and follow up of diarrhea. She has been taking Welchol 625 MG, 2 BID with some improvement in bowel habits.  Currently admit 2-3 defecations per day.  Stools are 50% of the time normal formed and loose/watery.  Denies melena, blood or mucus in stools.  Denies fecal urgency.  Continue to admit episodes of fecal incontinence.      Last visit (7/18/2018) Patient presents today for a follow up after her colonoscopy, EGD and labs.  She denies any complications after her procedure. She continues to have 3-4 bowel movements per day ranging from solid to watery/loose. She denies mucous or melena. She does admit to occasional bright red blood present on toilet paper following a bowel movement. She denies taking  Imodium A-D or Hyoscyamine.  Last visit (6/26/2018) Patient presents today for a consultation of diarrhea since the past 6 months . She states that symptoms started around June 9 th, 2018 after eating dinner. She was evaluated by [facility] on Hannah 10, 2018 for her symptoms. At that time stool studies were completed. She is currently having approximately five watery/loose stools per day. She denies any blood, mucous or melena. However, patient admits the amount of stools may increase if she consumes more food. She has been avoiding eating because that triggers her to have a bowel movement. She has had an eight pound unintentional weight loss since her last visit on 04/24/2018.    She did have an MRI of the abdomen completed on 04/06/2018 which revealed subtle sludge or concentrated bile within the gallbladder.  Per patient her last colonoscopy was 2-3 years ago. She denies having colon polyps at that time.  Of, note patient was prescribed Keflex on May 20, 2018.;   Hospital Follow Up : Denies any hospitalization since last visit.  Last visit (3/31/2020) Denies any hospitalization since last visit.   Patient presented to [facility] ED June 22, 2019 with abdominal pain.  Onset 5 days prior to ER.  Described as a constant pain located right abdomen and flank area.  Denies any aggravating factors. Denies any hospitalization since.     Patient presented to [facility] ED June 22, 2019 with abdominal pain.  Onset 5 days prior to ER.  Described as a constant pain located right abdomen and flank area.  Denies any aggravating factors.;

## 2020-07-23 ENCOUNTER — TELEPHONE ENCOUNTER (OUTPATIENT)
Dept: URBAN - METROPOLITAN AREA CLINIC 35 | Facility: CLINIC | Age: 60
End: 2020-07-23

## 2020-07-24 ENCOUNTER — TELEPHONE ENCOUNTER (OUTPATIENT)
Dept: URBAN - METROPOLITAN AREA CLINIC 35 | Facility: CLINIC | Age: 60
End: 2020-07-24

## 2020-07-24 RX ORDER — CELECOXIB 100 MG/1
1 CAPSULE WITH FOOD CAPSULE ORAL TWICE A DAY
Qty: 28 CAPSULE | Refills: 0 | OUTPATIENT
Start: 2020-07-24

## 2020-07-26 PROBLEM — 111359004 DIVERTICULITIS OF COLON: Status: ACTIVE | Noted: 2020-03-31

## 2020-07-26 PROBLEM — 301716002: Status: ACTIVE | Noted: 2020-03-03

## 2020-07-26 PROBLEM — 274774002 ELEVATED LEVELS OF TRANSAMINASE & LACTIC ACID DEHYDROGENASE: Status: ACTIVE | Noted: 2018-04-03

## 2020-07-26 PROBLEM — 62315008 DIARRHEA: Status: ACTIVE | Noted: 2019-01-14

## 2020-07-26 PROBLEM — 72042002: Status: ACTIVE | Noted: 2018-07-18

## 2020-07-27 ENCOUNTER — TELEPHONE ENCOUNTER (OUTPATIENT)
Dept: URBAN - METROPOLITAN AREA CLINIC 35 | Facility: CLINIC | Age: 60
End: 2020-07-27

## 2020-07-28 ENCOUNTER — OFFICE VISIT (OUTPATIENT)
Dept: URBAN - METROPOLITAN AREA CLINIC 35 | Facility: CLINIC | Age: 60
End: 2020-07-28

## 2020-07-28 VITALS — HEIGHT: 64 IN | BODY MASS INDEX: 24.59 KG/M2 | WEIGHT: 144 LBS

## 2020-07-28 RX ORDER — B-COMPLEX WITH VITAMIN C
1 TABLET TABLET ORAL ONCE A DAY
Status: ACTIVE | COMMUNITY

## 2020-07-28 RX ORDER — MAGNESIUM OXIDE/MAG AA CHELATE 300 MG
1 CAPSULE WITH A MEAL CAPSULE ORAL ONCE A DAY
Status: ACTIVE | COMMUNITY

## 2020-07-28 RX ORDER — COLESEVELAM HYDROCHLORIDE 625 MG/1
3 TABLETS WITH MEALS TABLET, FILM COATED ORAL TWICE A DAY
Status: ACTIVE | COMMUNITY
Start: 2020-06-15

## 2020-07-28 RX ORDER — HYOSCYAMINE SULFATE 0.12 MG/1
1 TABLET AS NEEDED TABLET ORAL
Qty: 40 TABLET | Refills: 1 | Status: DISCONTINUED | COMMUNITY
Start: 2020-03-03

## 2020-07-28 RX ORDER — VORTIOXETINE 10 MG/1
1 TABLET TABLET, FILM COATED ORAL ONCE A DAY
Status: ACTIVE | COMMUNITY

## 2020-07-28 RX ORDER — CIPROFLOXACIN HCL 500 MG
1 TABLET TABLET ORAL
Qty: 20 | Refills: 0 | Status: ON HOLD | COMMUNITY
Start: 2020-03-12

## 2020-07-28 RX ORDER — NITROFURANTOIN MONOHYDRATE/MACROCRYSTALLINE 25; 75 MG/1; MG/1
1 CAPSULE AT BEDTIME WITH FOOD CAPSULE ORAL ONCE A DAY
Qty: 30 | Status: ON HOLD | COMMUNITY

## 2020-07-28 RX ORDER — MONTELUKAST SODIUM 10 MG/1
1 TABLET IN THE EVENING TABLET, FILM COATED ORAL ONCE A DAY
Status: ACTIVE | COMMUNITY

## 2020-07-28 RX ORDER — METRONIDAZOLE 500 MG/1
1 TABLET TABLET, FILM COATED ORAL THREE TIMES A DAY
Qty: 30 | Refills: 0 | Status: ON HOLD | COMMUNITY
Start: 2020-03-12

## 2020-07-28 RX ORDER — FIDAXOMICIN 200 MG/1
1 TABLET TABLET, FILM COATED ORAL TWICE A DAY
Qty: 20 | Refills: 0 | Status: ON HOLD | COMMUNITY
Start: 2020-06-15

## 2020-07-28 RX ORDER — DESIPRAMINE HYDROCHLORIDE 25 MG/1
1 TABLET TABLET, FILM COATED ORAL ONCE A DAY
Qty: 30 | OUTPATIENT
Start: 2020-07-28

## 2020-07-28 RX ORDER — MULTIVIT-MIN/IRON/FOLIC ACID/K 18-600-40
AS DIRECTED CAPSULE ORAL
Status: ACTIVE | COMMUNITY

## 2020-07-28 RX ORDER — ALPRAZOLAM 2 MG/1
1 TABLET TABLET, EXTENDED RELEASE ORAL TWICE A DAY
Status: ACTIVE | COMMUNITY

## 2020-07-28 RX ORDER — DICYCLOMINE HYDROCHLORIDE 10 MG/1
1 CAPSULE CAPSULE ORAL
Qty: 30 | Refills: 1 | Status: ACTIVE | COMMUNITY
Start: 2020-07-06

## 2020-07-28 RX ORDER — BUPROPION HYDROCHLORIDE 300 MG/1
1 TABLET IN THE MORNING TABLET, EXTENDED RELEASE ORAL ONCE A DAY
Status: ACTIVE | COMMUNITY

## 2020-07-28 RX ORDER — LACTOBACILLUS RHAMNOSUS GG 10B CELL
1 CAPSULE CAPSULE ORAL ONCE A DAY
Status: ACTIVE | COMMUNITY

## 2020-07-28 RX ORDER — FIDAXOMICIN 200 MG/1
1 TABLET TABLET, FILM COATED ORAL TWICE A DAY
Qty: 10 TABLET | Status: ON HOLD | COMMUNITY
Start: 2020-06-24

## 2020-07-28 RX ORDER — HYDROXYCHLOROQUINE SULFATE 200 MG/1
1 TABLET WITH FOOD OR MILK TABLET ORAL BID
Status: ACTIVE | COMMUNITY

## 2020-07-28 RX ORDER — ZOLPIDEM TARTRATE 5 MG/1
(SCHEDULE IV DRUG) TAKE ONE TABLET BY MOUTH AT BEDTIME TABLET, COATED ORAL
Qty: 30 UNSPECIFIED | Refills: 0 | Status: ACTIVE | COMMUNITY

## 2020-07-28 RX ORDER — CHOLECALCIFEROL TAB 50 MCG (2000 UNIT) 50 MCG
1 TABLET TAB ORAL ONCE A DAY
Status: ACTIVE | COMMUNITY

## 2020-07-28 RX ORDER — GABAPENTIN 100 MG/1
1 CAPSULE CAPSULE ORAL ONCE A DAY
Status: ON HOLD | COMMUNITY

## 2020-07-28 RX ORDER — SULFAMETHOXAZOLE AND TRIMETHOPRIM 800; 160 MG/1; MG/1
1 TABLET TABLET ORAL TWICE A DAY
Qty: 20 | Refills: 0 | Status: ON HOLD | COMMUNITY
Start: 2020-03-13

## 2020-07-28 RX ORDER — HYDROXYCHLOROQUINE SULFATE 200 MG/1
1 TABLET WITH FOOD OR MILK TABLET ORAL TWICE A DAY
Status: ON HOLD | COMMUNITY

## 2020-07-28 RX ORDER — PRAVASTATIN SODIUM 20 MG/1
1 TABLET TABLET ORAL ONCE A DAY
Status: ACTIVE | COMMUNITY

## 2020-07-28 RX ORDER — MESALAMINE 1000 MG/1
1 SUPPOSITORY AT BEDTIME SUPPOSITORY RECTAL ONCE A DAY
Qty: 30 | Status: DISCONTINUED | COMMUNITY
Start: 2020-05-11

## 2020-07-28 RX ORDER — ELUXADOLINE 75 MG/1
1 TABLET WITH FOOD TABLET, FILM COATED ORAL TWICE A DAY
Status: ON HOLD | COMMUNITY
Start: 2018-09-18

## 2020-07-28 RX ORDER — CELECOXIB 100 MG/1
1 CAPSULE WITH FOOD CAPSULE ORAL TWICE A DAY
Qty: 28 CAPSULE | Refills: 0 | Status: ON HOLD | COMMUNITY
Start: 2020-07-24

## 2020-07-28 RX ORDER — SPIRONOLACTONE 25 MG/1
1 TABLET TABLET ORAL ONCE A DAY
Status: ACTIVE | COMMUNITY

## 2020-07-28 RX ORDER — ESOMEPRAZOLE MAGNESIUM 40 MG/1
1 CAPSULE CAPSULE, DELAYED RELEASE ORAL ONCE A DAY
Status: ON HOLD | COMMUNITY

## 2020-07-28 RX ORDER — MESALAMINE 1000 MG/1
1 SUPPOSITORY SUPPOSITORY RECTAL BID
Qty: 60 | Refills: 1 | Status: ON HOLD | COMMUNITY
Start: 2018-07-13

## 2020-07-28 RX ORDER — HYOSCYAMINE SULFATE 0.125 MG
1 TABLET AS NEEDED TABLET ORAL
Qty: 60 TABLET | Refills: 1 | Status: ON HOLD | COMMUNITY
Start: 2018-06-27

## 2020-07-28 RX ORDER — RIZATRIPTAN BENZOATE 5 MG/1
1 TABLET ON THE TONGUE AND ALLOW TO DISSOLVE AS NEEDED ONE TIME TABLET, ORALLY DISINTEGRATING ORAL ONCE A DAY
Status: ON HOLD | COMMUNITY

## 2020-07-28 RX ORDER — HYDROCORTISONE ACETATE 0.5 %
AS DIRECTED CREAM (GRAM) TOPICAL
Status: ACTIVE | COMMUNITY

## 2020-07-28 RX ORDER — TRAMADOL HYDROCHLORIDE 50 MG/1
TABLET, FILM COATED ORAL TWICE DAILY
Status: ACTIVE | COMMUNITY

## 2020-07-28 RX ORDER — IBUPROFEN 800 MG/1
TAKE ONE TABLET BY MOUTH THREE TIMES A DAY TABLET, FILM COATED ORAL
Qty: 90 UNSPECIFIED | Refills: 0 | Status: ACTIVE | COMMUNITY

## 2020-07-28 RX ORDER — NEBIVOLOL HYDROCHLORIDE 5 MG/1
1 TABLET TABLET ORAL ONCE A DAY
Status: ACTIVE | COMMUNITY

## 2020-07-28 RX ORDER — CHOLESTYRAMINE 4 G/9G
1 PACKET MIXED WITH WATER OR NON-CARBONATED DRINK POWDER, FOR SUSPENSION ORAL TWICE A DAY
Qty: 60 | Refills: 1 | Status: ON HOLD | COMMUNITY
Start: 2020-06-11

## 2020-07-28 RX ORDER — VANCOMYCIN HYDROCHLORIDE 125 MG/1
1 CAPSULE CAPSULE ORAL
Qty: 40 | Refills: 0 | Status: ON HOLD | COMMUNITY
Start: 2020-06-03

## 2020-07-28 RX ORDER — LEVOMEFOLATE/ALGAL OIL 15-90.314
1 CAPSULE CAPSULE ORAL ONCE A DAY
Status: ACTIVE | COMMUNITY

## 2020-07-28 RX ORDER — COLESEVELAM HYDROCHLORIDE 625 MG/1
3 TABLETS WITH MEALS TABLET, FILM COATED ORAL TWICE A DAY
OUTPATIENT
Start: 2020-06-15

## 2020-07-28 NOTE — HPI-MIGRATED HPI
;   ;   ;   ;   ;   ;   ;     Abdominal Pain : Patient presents today via televisit with consent for a follow up of abdominal pain.  Pain is located just below and periumbilical.  Described as a dull ache that occur daily in an intermittent pattern.  She will wake up with diffuse lower back pain and abdominal pain. Back pain gradually dissipate over the course of an hour,  but the abdominal pain continue to persist.  She report no clear aggravating factor, but occasionally will get relief with laying on her right side.   She is taking Dicyclomine 10 mg 1 TID without significant benefit.  She did not try the Celebrex after reading the warning insert.   She has an appt. with her Urogynecologist on Friday.   Of note, patient presented to [facility] on (7/23/2020) at the advise of our office due to uncontrolled severe abdominal pain.   Last visit (7/22/2020) Of note, patient contact the office on (7/6/2020) stating she completed the Dificid last week and continue to take Welchol 625 MG and hyoscyamine, but continue to have lower abdominal pain.  Pain is constant and described as a cramping pain. She report the hyoscyamine is not as effective.    Symptoms start upon waking up in the mornings and persist throughout the day. Patient state "something's got to give".  Subsequently she was advised to  Stop Hyoscyamine and start Dicyclomine 10 mg po QID prn   Patient reports Dicyclomine 10 mg po QID prn is not effective.    Last visit (06/24/2020)  Patient contact the office on (5/15/2020) c/o continue to experience abdominal pain and fecal incontinence 5+ times a day.   She admit lower abdominal pain below the umbilicus. Symptoms are present most morning upon waking up and will persist throughout the day.  She has been taking Hyoscyamine 2-3 times a day with temporary relief.       Last visit (5/15/2020) Patient presents today via televisit with consent for a follow up of increase LLQ abdominal pain.   Pain has been present constant and described as a dull ache. Denies any aggravating factors.   Admit some relief with the use of Hyoscyamine 1 BID.  She contact the office on (3/8/2020) c/o 5-6 loose BM's per day with fecal seepage and LLQ abdominal pain. Symptoms were improved then began to increase several weeks ago.  Denies melena, blood or mucus in stools. Denies pruritus ani, rectal pain or bleeding.   Abdominal pain is described as a dull ache that occur randomly throughout the day and will last for minutes to hours.  She ran out of the Hyoscyamine weeks ago.  Denies fever or chills.  Subsequently patient was informed to go to ER if LLQ pain is persistent and worse.      Last visit (3/31/2020) Patient presents today via tele visit to review CT, labs, stool study and follow up of LLQ abdominal pain associated with diverticulitis.  She admit improvement of symptoms with the use of Hyoscyamine and Ibuprofen 800 mg prn .  Last episode occurred yesterday.   Described as a sharp pain, that is typically present each day upon waking in the mornings.  She completed treatment for diverticulitis, Bactrim DS and Flagyl on (3/22/2020)   Of note, patient contact the office on (3/13/2020) with c/o 5-6 loose and watery BM's per day and episodes of fecal incontinence since 2 days after her last visit.  Described as full fecal seepage that occur with bending over or walking.   She state she has been treated with 3 antibiotics since Jan. 2020- Cefdnir x 10 days in Jan. 2020 for URI, then Cipro 500 mg 1 BID x 7 days on (2/3/2020), which she took for 3 days then told to d/c and start Moxifloxacin 100 mg x 10 days on (2/11/2020) due to the Diverticulitis.   Subsequently a stool study was ordered, treated with Flagyl 500 mg TID, Bactrim DS BID x 10 days, take Imodium prn with caution.  On (3/19/2020) patient continue to c/o LLQ abdominal pain upon waking up in the mornings. Pain is sharp in nature.  She will take Hyoscyamine, but it will take 1-2 hours to get relief.   Last visit (3/3/2020) Patient presents today for a follow up of abdominal pain. She denies any episodes of abdominal pain since her last office visit.   Patient had a CT Chest/ABD/Pelvis completed on 2/5/2020 showing mild diverticulitis of the proximal descending colon, with mild moderate inflammatory changes, including fat stranding and trace fluid. Moderate diffuse diverticulosis of the colon, more extensively involving the left colon. Groundglass infiltrate in the posterior right upper lobe, suspicious for possible pneumonia. Mild hepatic steatosis.   Patient admits that she was on abx and steroids due to a respiratory infection in December   Last visit (1/15/2019) Patient admits experiencing abdominal pain rarely since her last office visit.    10/16/18 Patient marks improvement of abdominal pain. She states that she has had 4-5 episodes since her last office visit 09/18/2018.  09/18/2018 Patient continues to have abdominal pain daily. She describes episodes as a cramping sensation throughout her lower abdomen. She typically begins to have symptoms after consuming a meal.  She is unsure if symptoms are improved with Dicyclomine 10 mg BID.   Of note, Patient called the office on 09/05/2018 complaining of abdominal cramping and  fecal incontinence. As a result we increased her Welchol 625mg from 2 tabs po bid to 2 tabs po tid.   08/22/2018 She admit improvement of symptoms with the use of Dicyclomine 10 mg BID.    Of note, patient contact the office (8/1/2018) stating the Hyoscyamine wasn't as effective, subsequently she was changed to Dicyclomine 10 mg po TID prn.  Last visit (7/18/2018) Patient continues to have abdominal discomfort. She admits the pain is infrequent and ranges from the left to right side of her abdomen. She continues to deny any aggravating factors.    Last visit (6/26/2018) Patient continues to have right sided abdominal pain accompanied with diarrhea daily. She describes episodes as a severe cramping sensation. She denies any aggravating factors.    04/24/2018 Continues to have right sided abdominal pain , intermittent severity , constant . No clear aggravating or alleviating factors     At last visit (10/31/2017) Admits abdominal pain that is located mid region and occur in an intermittent pattern.  Onset Dec. 2016 right after her hemorrhoidectomy and never got better.  Described as a dull ache that last for minutes to hours at a time.   Symptoms are mostly noticed upon waking up.  Admit relief of symptoms after defecation.     Patient denies any episodes of RUQ abdominal pain this year.  Symptoms were described as a dull aching to crampy and sharp pain.  Pain was initially intermittently, then  began to  worsen first of May. Patient notes as symptoms progressively worsen she went to [facility] ER 5/3/2016 and at that time her pain was severe, level 8 /10.  The Location of pain at the time she was seen in ER was RUQ abdomen and radiated to right side of her back, and middle of the back. Pain was worse with inhalation.  She had CT Abdomen, Chest Xray and was treated with IV fluids, Morphine with great relief of symptoms. Patient was discharged with Reasnor 5/325 #12 and to follow up with GI, and PCP.  She denies any abdominal pain since the day after she discharged from [facility] ER.  Patient denies the use of antibiotics within the last (6) months. 	    Outside Labs:  (5/3/2016) Glucose 105 (H), Creatinine 1.16 (H), Protein Total 8.5 (H), ALT 36 (N), AST 20 (N), Albumin 4.5 (N), Total Bilirubin 0.6 (N), Total Calcium 10.6 (H), Alkaline Phosphatase 65 (N), Lipase 391 (N), WBC 7.2 (N), RBC 4.98 (N), Hemoglobin 15.4 (N), Hematocrit 44.4 (H), Platelet 345 (N).    Outside Imaging: (5/3/2016) CT Pelvis with impression: No acute intra-abdominal process. Colonic diverticulosis without diverticulitis. Normal appendix. No bowel obstruction. Persistent prominent appearance of the pancreatic duct measuring up to 3.9 mm in maximal diameter. Fatty liver with hepatomegaly.  (05/15/2015) CT Abdomen and Pelvis with contrast impression: Normal appendix. Descending and sigmoid colon diverticula. No diverticulitis. Borderline pancreatic ductal dilation. No biliary ductal dilation. Further evaluation with MRI of the abdomen and MRCP may be obtained.;   Fecal incontinence : Admit random episodes of fecal incontinence with associated fecal urgency.   Last visit (7/22/2020) Continue to experience Fecal incontinence episodes daily.   Symptoms are associated with diarrhea episodes, at least 5-6 times a day.  Last visit (06/24/2020) Continue to experience Fecal incontinence episodes daily associated with diarrhea episodes, 5+ times a day..  She has been wearing under pads due to symptoms.   Last visit (5/11/2020) Admit having fecal incontinence while sitting last week. She has a Cystocele/Rectocele since 2016 and report having Vaginal Pessary Inserted in (2019) performed by Dr. Edie Holden.   Last visit (3/31/2020) Continue to experience Fecal incontinence episodes daily associated with diarrhea episodes.  She has been wearing under pads due to symptoms.    Of note, patient contact the office on (3/13/2020) with c/o 5-6 loose and watery BM's per day and episodes of fecal incontinence since 2 days after her last visit.  Described as full fecal seepage that occur with bending over or walking.      Last visit (3/3/2020) Patient denies episodes of Fecal incontinence since last office visit.  Last visit (1/15/2019) Patient denies experiencing any fecal incontinence episodes since her last office visit.    10/16/18 Patient states that she has had improvement of symptoms. She has had less episodes since having a pessary device inserted.  09/18/2018 Patient continues to have episodes of fecal incontinence daily.   08/22/2018 She continue to experience fecal incontinence. Symptoms occur 1-2 times per day.  She continue to ware a pad in her under garment, because she does not feel the leakage.    Last visit (7/18/2018)Patient continues to have fecal incontinence.   Last visit (6/26/2018) Patient continues to have fecal incontinence. She states that she has seen Dr. Butt since her last visit. Of note, this was prior to her having diarrhea. At the time of her consultation he diagnosed her with cystocele and rectocele. In addition, they discussed her having a device placed in her back to help control her sphincter.   04/24/2018 Continues to have fecal incontinence and will be following up with Dr. ATUL Butt Continue to experience Fecal incontinence episodes daily associated with diarrhea episodes.  she has been wearing under pads due to symptoms.    Of note, patient contact the office on (3/13/2020) with c/o 5-6 loose and watery BM's per day and episodes of fecal incontinence since 2 days after her last visit.  Described as full fecal seepage that occur with bending over or walking.      Last visit (3/3/2020) Patient denies episodes of Fecal incontinence since last office visit.  Last visit (1/15/2019) Patient denies experiencing any fecal incontinence episodes since her last office visit.    10/16/18 Patient states that she has had improvement of symptoms. She has had less episodes since having a pessary device inserted.  09/18/2018 Patient continues to have episodes of fecal incontinence daily.   08/22/2018 She continue to experience fecal incontinence. Symptoms occur 1-2 times per day.  She continue to ware a pad in her under garment, because she does not feel the leakage.    Last visit (7/18/2018)Patient continues to have fecal incontinence.   Last visit (6/26/2018) Patient continues to have fecal incontinence. She states that she has seen Dr. Butt since her last visit. Of note, this was prior to her having diarrhea. At the time of her consultation he diagnosed her with cystocele and rectocele. In addition, they discussed her having a device placed in her back to help control her sphincter.   04/24/2018 Continues to have fecal incontinence and will be following up with Dr. ATUL Butt ;   Abnormal MRI : Visit (1/15/2019) Patient admits that she has had a heart test completed as well as a MRI of her neck completed in December and she has not yet received the results.  10/16/2018 Denie any recent imaging completed.  09/18/2018 No recent imaging completed.  04/24/2018 Patient presents today for a follow up of her pancreas  divisum which was diagnosed via MRI Abdomen May 29, 2015.  She notes of having low back pain in May 2015 and was seen by her PCP who subsequently ordered an MRI Abdomen w/wo contrast.  (5/29/2015) MR Abdomen w/wo contrast impression: Pancreas divisum with separate drainage of the pancreatic duct and common bile duct ar the duodenum. Mild, diffuse pancreatic ductal dilation without obstructing mass or calculus.    Last visit (1/15/2019) Patient admits that she has had a heart test completed as well as a MRI of her neck completed in December and she has not yet received the results.  10/16/18 No recent imaging completed.  09/18/2018 No recent imaging completed.  04/24/2018 Patient presents today for a follow up of her pancreas  divisum which was diagnosed via MRI Abdomen May 29, 2015.  She notes of having low back pain in May 2015 and was seen by her PCP who subsequently ordered an MRI Abdomen w/wo contrast.  (5/29/2015) MR Abdomen w/wo contrast impression: Pancreas divisum with separate drainage of the pancreatic duct and common bile duct ar the duodenum. Mild, diffuse pancreatic ductal dilation without obstructing mass or calculus. ;   Dysphagia : She has a globus sensation that occur daily.  Denies dysphagia.    Last visit (7/22/2020)  She has a globus sensation since the past three days with regurgitation .   Patient can not recall starting. in her words ( It doesn't matter now ) Famotidine 20 mg po QHS 30-1    She continue to deny episodes of dysphagia Last visit (5/11/2020) She continue to deny episodes of dysphagia  Last visit (3/31/2020) She continue to deny episodes of dyspragia.  Last visit (3/3/2020) Patient denies episodes of dyspragia since last office visit.  Last visit (1/15/2019) Patient denies any episodes of dysphagia since her EGD with dilation was completed.   10/16/18 Patient continues to deny episodes of dysphagia.   09/18/2018 Patient continues to deny episodes of dysphagia.    08/22/2018 Continue to deny dysphagia since EGD w/dilation.    Last visit (7/18/2018) Patient denies recent episodes of dysphagia.  Last visit (6/26/2018) Patient denies recent episodes of dysphagia.  04/24/2018 Continues to have dysphagia with solid food , upper esophagus , few times a week    At last visit (10/31/2017)Difficulty in swallowing since the past year, few times a month associated with dryness of mouth due to Sjogren's She continue to deny episodes of dyspragia.  Last visit (3/3/2020) Patient denies episodes of dyspragia since last office visit.  Last visit (1/15/2019) Patient denies any episodes of dysphagia since her EGD with dilation was completed.   10/16/18 Patient continues to deny episodes of dysphagia.   09/18/2018 Patient continues to deny episodes of dysphagia.    08/22/2018 Continue to deny dysphagia since EGD w/dilation.    Last visit (7/18/2018) Patient denies recent episodes of dysphagia.  Last visit (6/26/2018) Patient denies recent episodes of dysphagia.  04/24/2018 Continues to have dysphagia with solid food , upper esophagus , few times a week    At last visit (10/31/2017)Difficulty in swallowing since the past year, few times a month associated with dryness of mouth due to Sjogren's    ;   Fatty Liver :     Last visit (7/22/2020) She continue to deny  jaundice, chills, RUQ pains, dizziness, or light headedness   Last visit (3/31/2020) She continue to deny  jaundice, chills, RUQ pains, dizziness, or light headedness. Admit still feeling fatigue she is unsure if this has to do with her Liver or her Sjogren's Disease.   Last visit (3/3/2020) Patient denies jaundice, chills, RUQ pains, dizziness, or light headedness. Admits still feeling fatigue she is unsure if this has to do with her Liver or her Sjrogen's Disease.  Last visit (01/15/2019) Patient currently denies jaundice, chills, RUQ pains, dizziness, or light headedness. Admits still feeling fatigue.    10/16/18 Patient was diagnosed with fatty liver in 2017.  She currently denies jaundice, chills, RUQ pains, dizziness, or light headedness Admit easy bruising over past few months, which is being follow by Hematologist Dr. Hakeem Fallon.  Admit several year history of feeling fatigue.    09/18/2018 Patient was diagnosed with fatty liver in 2017.  She currently denies jaundice, chills, RUQ pains, dizziness, or light headedness Admit easy bruising over past few months, which is being follow by Hematologist Dr. Hakeem Fallon.  Admit several year history of feeling fatigue.   08/22/2018 Patient was diagnosed with a fatty liver in 2017. Most recent labs with PCP Dr. Michelle Naidu a month ago due to skin rash from an allergic reaction from an unknown source. Labs completed (7/12/2018) revealed CBC and CMP within normal limits.   She currently denies jaundice, chills, RUQ pains, dizziness, or light headedness Admit easy bruising over past few months, which is being follow by Hematologist Dr. Hakeem Fallon.  Admit several year history of feeling fatigue.    04/24/2018 Patient was found to have fatty liver via CT abdomen on May 3, 2017.  Most recent labs with PCP Dr. Michelle Naidu  Aug. 23, 2017 at which time patient states her liver enzymes were fine, just had elevated calcium levels.  Patient currently denies jaundice, chills, RUQ pains, dizziness, or light headedness Admit easy bruising over past few months, which is being follow by Hematologist Dr. Hakeem Fallon.  Admit several year history of feeling fatigue.   Outside Labs:  (5/3/2016) Glucose 105 (H), Creatinine 1.16 (H), Protein Total 8.5 (H), ALT 36 (N), AST 20 (WNL), Albumin 4.5 (N), Total Bilirubin 0.6 (N), Total Calcium 10.6 (H), Alkaline Phosphatase 65 (N), Lipase 391 (N), WBC 7.2 (N), RBC 4.98 (N), Hemoglobin 15.4 (N), Hematocrit 44.4 (H), Platelet 345 (N).  (10/31/2017)NAFLD Score: -0.979   &lt; -1.455: predictor of absence of significant fibrosis (F0-F2 fibrosis) ? -1.455 to ? 0.675: indeterminate score > 0.675: predictor of presence of significant fibrosis (F3-F4 fibrosis)     Outside Imaging: (5/3/2016) CT Pelvis with impression: No acute intra-abdominal process. Colonic diverticulosis without diverticulitis. Normal appendix. No bowel obstruction. Persistent prominent appearance of the pancreatic duct measuring up to 3.9 mm in maximal diameter. Fatty liver with hepatomegaly.  (05/15/2015) CT Abdomen and Pelvis with contrast impression: Normal appendix. Descending and sigmoid colon diverticula. No diverticulitis. Borderline pancreatic ductal dilation. No biliary ductal dilation. Further evaluation with MRI of the abdomen and MRCP may be obtained. She continue to deny  jaundice, chills, RUQ pains, dizziness, or light headedness. Admit still feeling fatigue she is unsure if this has to do with her Liver or her Sjrogen's Disease.   Last visit (3/3/2020) Patient denies jaundice, chills, RUQ pains, dizziness, or light headedness. Admits still feeling fatigue she is unsure if this has to do with her Liver or her Sjrogen's Disease.  Last visit (01/15/2019) Patient currently denies jaundice, chills, RUQ pains, dizziness, or light headedness. Admits still feeling fatigue.    10/16/18 Patient was diagnosed with fatty liver in 2017.  She currently denies jaundice, chills, RUQ pains, dizziness, or light headedness Admit easy bruising over past few months, which is being follow by Hematologist Dr. Hakeem Fallon.  Admit several year history of feeling fatigue.    09/18/2018 Patient was diagnosed with fatty liver in 2017.  She currently denies jaundice, chills, RUQ pains, dizziness, or light headedness Admit easy bruising over past few months, which is being follow by Hematologist Dr. Hakeem Fallon.  Admit several year history of feeling fatigue.   08/22/2018 Patient was diagnosed with a fatty liver in 2017. Most recent labs with PCP Dr. Michelle Naidu a month ago due to skin rash from an allergic reaction from an unknown source. Labs completed (7/12/2018) revealed CBC and CMP within normal limits.   She currently denies jaundice, chills, RUQ pains, dizziness, or light headedness Admit easy bruising over past few months, which is being follow by Hematologist Dr. Hakeem Fallon.  Admit several year history of feeling fatigue.    04/24/2018 Patient was found to have fatty liver via CT abdomen on May 3, 2017.  Most recent labs with PCP Dr. Michelle Naidu  Aug. 23, 2017 at which time patient states her liver enzymes were fine, just had elevated calcium levels.  Patient currently denies jaundice, chills, RUQ pains, dizziness, or light headedness Admit easy bruising over past few months, which is being follow by Hematologist Dr. Hakeem Fallon.  Admit several year history of feeling fatigue.   Outside Labs:  (5/3/2016) Glucose 105 (H), Creatinine 1.16 (H), Protein Total 8.5 (H), ALT 36 (N), AST 20 (WNL), Albumin 4.5 (N), Total Bilirubin 0.6 (N), Total Calcium 10.6 (H), Alkaline Phosphatase 65 (N), Lipase 391 (N), WBC 7.2 (N), RBC 4.98 (N), Hemoglobin 15.4 (N), Hematocrit 44.4 (H), Platelet 345 (N).  (10/31/2017)NAFLD Score: -0.979   &lt; -1.455: predictor of absence of significant fibrosis (F0-F2 fibrosis) ? -1.455 to ? 0.675: indeterminate score > 0.675: predictor of presence of significant fibrosis (F3-F4 fibrosis)     Outside Imaging: (5/3/2016) CT Pelvis with impression: No acute intra-abdominal process. Colonic diverticulosis without diverticulitis. Normal appendix. No bowel obstruction. Persistent prominent appearance of the pancreatic duct measuring up to 3.9 mm in maximal diameter. Fatty liver with hepatomegaly.  (05/15/2015) CT Abdomen and Pelvis with contrast impression: Normal appendix. Descending and sigmoid colon diverticula. No diverticulitis. Borderline pancreatic ductal dilation. No biliary ductal dilation. Further evaluation with MRI of the abdomen and MRCP may be obtained. ;   Diarrhea : Currently admit 3 normal and formed evacuations and 1 episodes of watery bowel movements.  Admit associated episodes of fecal incontinence.  She continue to take Continue Welchol 625 MG, 3 BID.  She did not submit the stool study as ordered by Dr. Gallagher yesterday.   Last visit (7/22/2020) Patient admits to 5-6 bowel episodes of diarrhea a day.   Of note, patient contact the office on (7/6/2020) stating she completed the Dificid last week and continue to take Welchol 625 MG She denies diarrhea episodes over the past 2 weeks.  Stools are small semi-formed.  Denies straining. She want to discuss fecal transplant via pill form and Interstim.        Last visit (06/24/2020)  Patient presents today for a follow up of diarrhea, review MRI, and QDx results.  Patient was notified on (6/3/2020) QDx stool study was positive for C. Diff, subsequently she was treated with Vancomycin 125 mg po QID for 10 days.  Patient contact the office on (6/15/2020) c/o symptoms are no better. Also continue to experience abdominal pain and fecal incontinence 5+ times a day.    She also want to know if the cholestyramine come in a tablet form, because it say it can damage teeth, which she already has risk of dental damage from her Sjogren's.   Subsequently, patient was treated with Dificid 200 mg po BID for 10 days. Welchol 625 mg III po BID and Imodium - 1-2,  4 times a day prn.  She has not taken Imodium.   She admit bowel movements were getting back to normal last week over the course of 3 days while on a bland diet with having 3 normal formed BM's per day.  While off the diet she would have 3-5 BM's per day with a couple episodes fecal incontinence.  Denies melena, blood or mucus in stools.     Last visit (5/11/2020) Admit 5-6 loose BM's per day with fecal seepage and LLQ abdominal pain. Symptoms were improved then began to increase several weeks ago.  Denies melena, blood or mucus in stools.  Denies pruritus ani, rectal pain or bleeding.    She report maybe 1-2 days a week she will have normal and formed bowel movements.     Last visit (3/31/2020) Admit 5-6 bowel movements per day with 2-3 episodes of diarrhea at least once a day since last office visit.  Denies melena, blood or mucus in stools.  Of note, patient contact the office on (3/13/2020) with c/o 5-6 loose and watery BM's per day and episodes of fecal incontinence since 2 days after her last visit.  Described as full fecal seepage that occur with bending over or walking.    Subsequently a stool study was ordered, treated with Flagyl 500 mg TID, Bactrim DS BID x 10 days, take Imodium prn with caution.  She completed the 10 day course of Flagyl 500 mg TID, Bactrim DS BID on (3/22/2020).   Last visit (3/3/2020)She reports 2-3 bowel movements a day with no strain. She admits her stools are formed with no bleeding, melena, or mucus at this time.   Last visit (1/15/2019) Patient presents today for a follow up of diarrhea. Patient admits rarely experiencing diarrhea since her last office visit. Patient admits 1-3 bowel movements a day. Stools are normal in form.   Patient admits that she is scheduled to see Dr Holden tomorrow.       Patient denies the use of Welchol Tablet, 625 MG, 2 tablets prn with meals, Orally, TID. Patient states that she wanted to clear her body from the medications that she was taking.   Patient denies the use of Dicyclomine HCl Capsule, 10 MG, 1 tablet prn, Orally, TID. Patient states that she felt that this medication wasn't helping her so she stopped taking it.    10/16/18 Patient presents today for a follow up of diarrhea. She states there has been an improvement of symptoms and she admits to having 2-3 bowel movements per day. Ranging from solid to loose stools.   Patient was evaluated by Dr. Navin vernon Urogynecologist at [facility] who administered a pessary, which is a prosthetic device that can be inserted into the vagina to support its internal structure. She was advised this device tends to improve diarrhea and fecal incontinence in patients that have rectocele. Patient also has started Pelvic Floor Therapy and has completed two sessions.   Of note, patient never started Viberzi Tablet, 75 MG.   09/18/2018 Patient presents today for a follow up of diarrhea. She continues to have episodes of watery/loose stools daily. She states she can have up to 5-6 bowel movements per day. She denies blood, mucous or melena.    08/22/2018 Patient presents today to review QDx stool study, lab results and follow up of diarrhea. She has been taking Welchol 625 MG, 2 BID with some improvement in bowel habits.  Currently admit 2-3 defecations per day.  Stools are 50% of the time normal formed and loose/watery.  Denies melena, blood or mucus in stools.  Denies fecal urgency.  Continue to admit episodes of fecal incontinence.      Last visit (7/18/2018) Patient presents today for a follow up after her colonoscopy, EGD and labs.  She denies any complications after her procedure. She continues to have 3-4 bowel movements per day ranging from solid to watery/loose. She denies mucous or melena. She does admit to occasional bright red blood present on toilet paper following a bowel movement. She denies taking  Imodium A-D or Hyoscyamine.  Last visit (6/26/2018) Patient presents today for a consultation of diarrhea since the past 6 months . She states that symptoms started around June 9 th, 2018 after eating dinner. She was evaluated by [facility] on Hannah 10, 2018 for her symptoms. At that time stool studies were completed. She is currently having approximately five watery/loose stools per day. She denies any blood, mucous or melena. However, patient admits the amount of stools may increase if she consumes more food. She has been avoiding eating because that triggers her to have a bowel movement. She has had an eight pound unintentional weight loss since her last visit on 04/24/2018.    She did have an MRI of the abdomen completed on 04/06/2018 which revealed subtle sludge or concentrated bile within the gallbladder.  Per patient her last colonoscopy was 2-3 years ago. She denies having colon polyps at that time.  Of, note patient was prescribed Keflex on May 20, 2018. Admit 5-6 bowel movements per day with 2-3 episodes of diarrhea at least once a day since last office visit.  Denies melena, blood or mucus in stools.  Of note, patient contact the office on (3/13/2020) with c/o 5-6 loose and watery BM's per day and episodes of fecal incontinence since 2 days after her last visit.  Described as full fecal seepage that occur with bending over or walking.    Subsequently a stool study was ordered, treated with Flagyl 500 mg TID, Bactrim DS BID x 10 days, take Imodium prn with caution.  She completed the 10 day course of Flagyl 500 mg TID, Bactrim DS BID on (3/22/2020).   Last visit (3/3/2020)She reports 2-3 bowel movements a day with no strain. She admits her stools are formed with no bleeding, melena, or mucus at this time.   Last visit (1/15/2019) Patient presents today for a follow up of diarrhea. Patient admits rarely experiencing diarrhea since her last office visit. Patient admits 1-3 bowel movements a day. Stools are normal in form.   Patient admits that she is scheduled to see Dr Holden tomorrow.       Patient denies the use of Welchol Tablet, 625 MG, 2 tablets prn with meals, Orally, TID. Patient states that she wanted to clear her body from the medications that she was taking.   Patient denies the use of Dicyclomine HCl Capsule, 10 MG, 1 tablet prn, Orally, TID. Patient states that she felt that this medication wasn't helping her so she stopped taking it.    10/16/18 Patient presents today for a follow up of diarrhea. She states there has been an improvement of symptoms and she admits to having 2-3 bowel movements per day. Ranging from solid to loose stools.   Patient was evaluated by Dr. Navin vernon Urogynecologist at [facility] who administered a pessary, which is a prosthetic device that can be inserted into the vagina to support its internal structure. She was advised this device tends to improve diarrhea and fecal incontinence in patients that have rectocele. Patient also has started Pelvic Floor Therapy and has completed two sessions.   Of note, patient never started Viberzi Tablet, 75 MG.   09/18/2018 Patient presents today for a follow up of diarrhea. She continues to have episodes of watery/loose stools daily. She states she can have up to 5-6 bowel movements per day. She denies blood, mucous or melena.    08/22/2018 Patient presents today to review QDx stool study, lab results and follow up of diarrhea. She has been taking Welchol 625 MG, 2 BID with some improvement in bowel habits.  Currently admit 2-3 defecations per day.  Stools are 50% of the time normal formed and loose/watery.  Denies melena, blood or mucus in stools.  Denies fecal urgency.  Continue to admit episodes of fecal incontinence.      Last visit (7/18/2018) Patient presents today for a follow up after her colonoscopy, EGD and labs.  She denies any complications after her procedure. She continues to have 3-4 bowel movements per day ranging from solid to watery/loose. She denies mucous or melena. She does admit to occasional bright red blood present on toilet paper following a bowel movement. She denies taking  Imodium A-D or Hyoscyamine.  Last visit (6/26/2018) Patient presents today for a consultation of diarrhea since the past 6 months . She states that symptoms started around June 9 th, 2018 after eating dinner. She was evaluated by [facility] on Hannah 10, 2018 for her symptoms. At that time stool studies were completed. She is currently having approximately five watery/loose stools per day. She denies any blood, mucous or melena. However, patient admits the amount of stools may increase if she consumes more food. She has been avoiding eating because that triggers her to have a bowel movement. She has had an eight pound unintentional weight loss since her last visit on 04/24/2018.    She did have an MRI of the abdomen completed on 04/06/2018 which revealed subtle sludge or concentrated bile within the gallbladder.  Per patient her last colonoscopy was 2-3 years ago. She denies having colon polyps at that time.  Of, note patient was prescribed Keflex on May 20, 2018.;   Hospital Follow Up : Patient presented to [facility] on (7/23/2020) at the advise of our office due to uncontrolled severe abdominal pain.    CT Abd/Pelvis with contrast was performed revealing No findings to account for abdominal pain   Patient was discharged (7/23/2020) after labs, ct reviewed. Dr. Shah, radiology consulted advising mesenteric vessels unremarkable. Based on results, pt will be d/c with outpt follow up to ob/gyn and GI, Dr Ivory.     Last visit (7/22/2020)  Denies any hospitalization since last visit.  Last visit (3/31/2020) Denies any hospitalization since last visit.   Patient presented to [facility] ED June 22, 2019 with abdominal pain.  Onset 5 days prior to ER.  Described as a constant pain located right abdomen and flank area.  Denies any aggravating factors. Denies any hospitalization since.     Patient presented to [facility] ED June 22, 2019 with abdominal pain.  Onset 5 days prior to ER.  Described as a constant pain located right abdomen and flank area.  Denies any aggravating factors.;

## 2020-08-12 ENCOUNTER — TELEPHONE ENCOUNTER (OUTPATIENT)
Dept: URBAN - METROPOLITAN AREA CLINIC 35 | Facility: CLINIC | Age: 60
End: 2020-08-12

## 2020-08-19 ENCOUNTER — OFFICE VISIT (OUTPATIENT)
Dept: URBAN - METROPOLITAN AREA CLINIC 35 | Facility: CLINIC | Age: 60
End: 2020-08-19

## 2020-08-19 ENCOUNTER — TELEPHONE ENCOUNTER (OUTPATIENT)
Dept: URBAN - METROPOLITAN AREA CLINIC 35 | Facility: CLINIC | Age: 60
End: 2020-08-19

## 2020-08-19 RX ORDER — ESOMEPRAZOLE MAGNESIUM 40 MG/1
1 CAPSULE CAPSULE, DELAYED RELEASE ORAL ONCE A DAY
Status: ON HOLD | COMMUNITY

## 2020-08-19 RX ORDER — RIZATRIPTAN BENZOATE 5 MG/1
1 TABLET ON THE TONGUE AND ALLOW TO DISSOLVE AS NEEDED ONE TIME TABLET, ORALLY DISINTEGRATING ORAL ONCE A DAY
Status: ON HOLD | COMMUNITY

## 2020-08-19 RX ORDER — ALPRAZOLAM 2 MG/1
1 TABLET TABLET, EXTENDED RELEASE ORAL TWICE A DAY
Status: ACTIVE | COMMUNITY

## 2020-08-19 RX ORDER — FIDAXOMICIN 200 MG/1
1 TABLET TABLET, FILM COATED ORAL TWICE A DAY
Qty: 20 | Refills: 0 | Status: ON HOLD | COMMUNITY
Start: 2020-06-15

## 2020-08-19 RX ORDER — VANCOMYCIN HYDROCHLORIDE 125 MG/1
1 CAPSULE CAPSULE ORAL
Qty: 40 | Refills: 0 | Status: ON HOLD | COMMUNITY
Start: 2020-06-03

## 2020-08-19 RX ORDER — B-COMPLEX WITH VITAMIN C
1 TABLET TABLET ORAL ONCE A DAY
Status: ACTIVE | COMMUNITY

## 2020-08-19 RX ORDER — TRAMADOL HYDROCHLORIDE 50 MG/1
TABLET, FILM COATED ORAL TWICE DAILY
Status: ACTIVE | COMMUNITY

## 2020-08-19 RX ORDER — HYDROCORTISONE ACETATE 0.5 %
AS DIRECTED CREAM (GRAM) TOPICAL
Status: ACTIVE | COMMUNITY

## 2020-08-19 RX ORDER — HYOSCYAMINE SULFATE 0.125 MG
1 TABLET AS NEEDED TABLET ORAL
Qty: 60 TABLET | Refills: 1 | Status: ON HOLD | COMMUNITY
Start: 2018-06-27

## 2020-08-19 RX ORDER — PRAVASTATIN SODIUM 20 MG/1
1 TABLET TABLET ORAL ONCE A DAY
Status: ACTIVE | COMMUNITY

## 2020-08-19 RX ORDER — GABAPENTIN 100 MG/1
1 CAPSULE CAPSULE ORAL ONCE A DAY
Status: ON HOLD | COMMUNITY

## 2020-08-19 RX ORDER — LACTOBACILLUS RHAMNOSUS GG 10B CELL
1 CAPSULE CAPSULE ORAL ONCE A DAY
Status: ACTIVE | COMMUNITY

## 2020-08-19 RX ORDER — NEBIVOLOL HYDROCHLORIDE 5 MG/1
1 TABLET TABLET ORAL ONCE A DAY
Status: ACTIVE | COMMUNITY

## 2020-08-19 RX ORDER — METRONIDAZOLE 500 MG/1
1 TABLET TABLET, FILM COATED ORAL THREE TIMES A DAY
Qty: 30 | Refills: 0 | Status: ON HOLD | COMMUNITY
Start: 2020-03-12

## 2020-08-19 RX ORDER — LEVOMEFOLATE/ALGAL OIL 15-90.314
1 CAPSULE CAPSULE ORAL ONCE A DAY
Status: ACTIVE | COMMUNITY

## 2020-08-19 RX ORDER — HYDROXYCHLOROQUINE SULFATE 200 MG/1
1 TABLET WITH FOOD OR MILK TABLET ORAL BID
Status: ACTIVE | COMMUNITY

## 2020-08-19 RX ORDER — CHOLECALCIFEROL TAB 50 MCG (2000 UNIT) 50 MCG
1 TABLET TAB ORAL ONCE A DAY
Status: ACTIVE | COMMUNITY

## 2020-08-19 RX ORDER — MAGNESIUM OXIDE/MAG AA CHELATE 300 MG
1 CAPSULE WITH A MEAL CAPSULE ORAL ONCE A DAY
Status: ACTIVE | COMMUNITY

## 2020-08-19 RX ORDER — HYDROXYCHLOROQUINE SULFATE 200 MG/1
1 TABLET WITH FOOD OR MILK TABLET ORAL TWICE A DAY
Status: ON HOLD | COMMUNITY

## 2020-08-19 RX ORDER — COLESEVELAM HYDROCHLORIDE 625 MG/1
3 TABLETS WITH MEALS TABLET, FILM COATED ORAL TWICE A DAY
Status: ACTIVE | COMMUNITY
Start: 2020-06-15

## 2020-08-19 RX ORDER — CHOLESTYRAMINE 4 G/9G
1 PACKET MIXED WITH WATER OR NON-CARBONATED DRINK POWDER, FOR SUSPENSION ORAL TWICE A DAY
Qty: 60 | Refills: 1 | Status: ON HOLD | COMMUNITY
Start: 2020-06-11

## 2020-08-19 RX ORDER — ZOLPIDEM TARTRATE 5 MG/1
(SCHEDULE IV DRUG) TAKE ONE TABLET BY MOUTH AT BEDTIME TABLET, COATED ORAL
Qty: 30 UNSPECIFIED | Refills: 0 | Status: ACTIVE | COMMUNITY

## 2020-08-19 RX ORDER — SPIRONOLACTONE 25 MG/1
1 TABLET TABLET ORAL ONCE A DAY
Status: ACTIVE | COMMUNITY

## 2020-08-19 RX ORDER — NITROFURANTOIN MONOHYDRATE/MACROCRYSTALLINE 25; 75 MG/1; MG/1
1 CAPSULE AT BEDTIME WITH FOOD CAPSULE ORAL ONCE A DAY
Qty: 30 | Status: ON HOLD | COMMUNITY

## 2020-08-19 RX ORDER — SULFAMETHOXAZOLE AND TRIMETHOPRIM 800; 160 MG/1; MG/1
1 TABLET TABLET ORAL TWICE A DAY
Qty: 20 | Refills: 0 | Status: ON HOLD | COMMUNITY
Start: 2020-03-13

## 2020-08-19 RX ORDER — DICYCLOMINE HYDROCHLORIDE 10 MG/1
1 CAPSULE CAPSULE ORAL
Qty: 30 | Refills: 1 | Status: ACTIVE | COMMUNITY
Start: 2020-07-06

## 2020-08-19 RX ORDER — VORTIOXETINE 10 MG/1
1 TABLET TABLET, FILM COATED ORAL ONCE A DAY
Status: ACTIVE | COMMUNITY

## 2020-08-19 RX ORDER — MONTELUKAST SODIUM 10 MG/1
1 TABLET IN THE EVENING TABLET, FILM COATED ORAL ONCE A DAY
Status: ACTIVE | COMMUNITY

## 2020-08-19 RX ORDER — FIDAXOMICIN 200 MG/1
1 TABLET TABLET, FILM COATED ORAL TWICE A DAY
Qty: 10 TABLET | Status: ON HOLD | COMMUNITY
Start: 2020-06-24

## 2020-08-19 RX ORDER — IBUPROFEN 800 MG/1
TAKE ONE TABLET BY MOUTH THREE TIMES A DAY TABLET, FILM COATED ORAL
Qty: 90 UNSPECIFIED | Refills: 0 | Status: ACTIVE | COMMUNITY

## 2020-08-19 RX ORDER — DESIPRAMINE HYDROCHLORIDE 25 MG/1
1 TABLET TABLET, FILM COATED ORAL ONCE A DAY
Qty: 30 | Status: ACTIVE | COMMUNITY
Start: 2020-07-28

## 2020-08-19 RX ORDER — CIPROFLOXACIN HCL 500 MG
1 TABLET TABLET ORAL
Qty: 20 | Refills: 0 | Status: ON HOLD | COMMUNITY
Start: 2020-03-12

## 2020-08-19 RX ORDER — BUPROPION HYDROCHLORIDE 300 MG/1
1 TABLET IN THE MORNING TABLET, EXTENDED RELEASE ORAL ONCE A DAY
Status: ACTIVE | COMMUNITY

## 2020-08-19 RX ORDER — MULTIVIT-MIN/IRON/FOLIC ACID/K 18-600-40
AS DIRECTED CAPSULE ORAL
Status: ACTIVE | COMMUNITY

## 2020-08-19 RX ORDER — CELECOXIB 100 MG/1
1 CAPSULE WITH FOOD CAPSULE ORAL TWICE A DAY
Qty: 28 CAPSULE | Refills: 0 | Status: ON HOLD | COMMUNITY
Start: 2020-07-24

## 2020-08-19 RX ORDER — MESALAMINE 1000 MG/1
1 SUPPOSITORY SUPPOSITORY RECTAL BID
Qty: 60 | Refills: 1 | Status: ON HOLD | COMMUNITY
Start: 2018-07-13

## 2020-08-19 RX ORDER — ELUXADOLINE 75 MG/1
1 TABLET WITH FOOD TABLET, FILM COATED ORAL TWICE A DAY
Status: ON HOLD | COMMUNITY
Start: 2018-09-18

## 2020-08-19 NOTE — HPI-MIGRATED HPI
;   ;   ;   ;   ;   ;   ;     Abdominal Pain :    Last visit (Patient presents today via televisit with consent for a follow up of abdominal pain.  Pain is located just below and periumbilical.  Described as a dull ache that occur daily in an intermittent pattern.  She will wake up with diffuse lower back pain and abdominal pain. Back pain gradually dissipate over the course of an hour,  but the abdominal pain continue to persist.  She report no clear aggravating factor, but occasionally will get relief with laying on her right side.   She is taking Dicyclomine 10 mg 1 TID without significant benefit.  She did not try the Celebrex after reading the warning insert.   She has an appt. with her Urogynecologist on Friday.   Of note, patient presented to [facility] on (7/23/2020) at the advise of our office due to uncontrolled severe abdominal pain.   Last visit (7/22/2020) Of note, patient contact the office on (7/6/2020) stating she completed the Dificid last week and continue to take Welchol 625 MG and hyoscyamine, but continue to have lower abdominal pain.  Pain is constant and described as a cramping pain. She report the hyoscyamine is not as effective.    Symptoms start upon waking up in the mornings and persist throughout the day. Patient state "something's got to give".  Subsequently she was advised to  Stop Hyoscyamine and start Dicyclomine 10 mg po QID prn   Patient reports Dicyclomine 10 mg po QID prn is not effective.    Last visit (06/24/2020)  Patient contact the office on (5/15/2020) c/o continue to experience abdominal pain and fecal incontinence 5+ times a day.   She admit lower abdominal pain below the umbilicus. Symptoms are present most morning upon waking up and will persist throughout the day.  She has been taking Hyoscyamine 2-3 times a day with temporary relief.       Last visit (5/15/2020) Patient presents today via televisit with consent for a follow up of increase LLQ abdominal pain.   Pain has been present constant and described as a dull ache. Denies any aggravating factors.   Admit some relief with the use of Hyoscyamine 1 BID.  She contact the office on (3/8/2020) c/o 5-6 loose BM's per day with fecal seepage and LLQ abdominal pain. Symptoms were improved then began to increase several weeks ago.  Denies melena, blood or mucus in stools. Denies pruritus ani, rectal pain or bleeding.   Abdominal pain is described as a dull ache that occur randomly throughout the day and will last for minutes to hours.  She ran out of the Hyoscyamine weeks ago.  Denies fever or chills.  Subsequently patient was informed to go to ER if LLQ pain is persistent and worse.      Last visit (3/31/2020) Patient presents today via tele visit to review CT, labs, stool study and follow up of LLQ abdominal pain associated with diverticulitis.  She admit improvement of symptoms with the use of Hyoscyamine and Ibuprofen 800 mg prn .  Last episode occurred yesterday.   Described as a sharp pain, that is typically present each day upon waking in the mornings.  She completed treatment for diverticulitis, Bactrim DS and Flagyl on (3/22/2020)   Of note, patient contact the office on (3/13/2020) with c/o 5-6 loose and watery BM's per day and episodes of fecal incontinence since 2 days after her last visit.  Described as full fecal seepage that occur with bending over or walking.   She state she has been treated with 3 antibiotics since Jan. 2020- Cefdnir x 10 days in Jan. 2020 for URI, then Cipro 500 mg 1 BID x 7 days on (2/3/2020), which she took for 3 days then told to d/c and start Moxifloxacin 100 mg x 10 days on (2/11/2020) due to the Diverticulitis.   Subsequently a stool study was ordered, treated with Flagyl 500 mg TID, Bactrim DS BID x 10 days, take Imodium prn with caution.  On (3/19/2020) patient continue to c/o LLQ abdominal pain upon waking up in the mornings. Pain is sharp in nature.  She will take Hyoscyamine, but it will take 1-2 hours to get relief.   Last visit (3/3/2020) Patient presents today for a follow up of abdominal pain. She denies any episodes of abdominal pain since her last office visit.   Patient had a CT Chest/ABD/Pelvis completed on 2/5/2020 showing mild diverticulitis of the proximal descending colon, with mild moderate inflammatory changes, including fat stranding and trace fluid. Moderate diffuse diverticulosis of the colon, more extensively involving the left colon. Groundglass infiltrate in the posterior right upper lobe, suspicious for possible pneumonia. Mild hepatic steatosis.   Patient admits that she was on abx and steroids due to a respiratory infection in December   Last visit (1/15/2019) Patient admits experiencing abdominal pain rarely since her last office visit.    10/16/18 Patient marks improvement of abdominal pain. She states that she has had 4-5 episodes since her last office visit 09/18/2018.  09/18/2018 Patient continues to have abdominal pain daily. She describes episodes as a cramping sensation throughout her lower abdomen. She typically begins to have symptoms after consuming a meal.  She is unsure if symptoms are improved with Dicyclomine 10 mg BID.   Of note, Patient called the office on 09/05/2018 complaining of abdominal cramping and  fecal incontinence. As a result we increased her Welchol 625mg from 2 tabs po bid to 2 tabs po tid.   08/22/2018 She admit improvement of symptoms with the use of Dicyclomine 10 mg BID.    Of note, patient contact the office (8/1/2018) stating the Hyoscyamine wasn't as effective, subsequently she was changed to Dicyclomine 10 mg po TID prn.  Last visit (7/18/2018) Patient continues to have abdominal discomfort. She admits the pain is infrequent and ranges from the left to right side of her abdomen. She continues to deny any aggravating factors.    Last visit (6/26/2018) Patient continues to have right sided abdominal pain accompanied with diarrhea daily. She describes episodes as a severe cramping sensation. She denies any aggravating factors.    04/24/2018 Continues to have right sided abdominal pain , intermittent severity , constant . No clear aggravating or alleviating factors     At last visit (10/31/2017) Admits abdominal pain that is located mid region and occur in an intermittent pattern.  Onset Dec. 2016 right after her hemorrhoidectomy and never got better.  Described as a dull ache that last for minutes to hours at a time.   Symptoms are mostly noticed upon waking up.  Admit relief of symptoms after defecation.     Patient denies any episodes of RUQ abdominal pain this year.  Symptoms were described as a dull aching to crampy and sharp pain.  Pain was initially intermittently, then  began to  worsen first of May. Patient notes as symptoms progressively worsen she went to [facility] ER 5/3/2016 and at that time her pain was severe, level 8 /10.  The Location of pain at the time she was seen in ER was RUQ abdomen and radiated to right side of her back, and middle of the back. Pain was worse with inhalation.  She had CT Abdomen, Chest Xray and was treated with IV fluids, Morphine with great relief of symptoms. Patient was discharged with [place] 5/325 #12 and to follow up with GI, and PCP.  She denies any abdominal pain since the day after she discharged from [facility] ER.  Patient denies the use of antibiotics within the last (6) months. 	    Outside Labs:  (5/3/2016) Glucose 105 (H), Creatinine 1.16 (H), Protein Total 8.5 (H), ALT 36 (N), AST 20 (N), Albumin 4.5 (N), Total Bilirubin 0.6 (N), Total Calcium 10.6 (H), Alkaline Phosphatase 65 (N), Lipase 391 (N), WBC 7.2 (N), RBC 4.98 (N), Hemoglobin 15.4 (N), Hematocrit 44.4 (H), Platelet 345 (N).    Outside Imaging: (5/3/2016) CT Pelvis with impression: No acute intra-abdominal process. Colonic diverticulosis without diverticulitis. Normal appendix. No bowel obstruction. Persistent prominent appearance of the pancreatic duct measuring up to 3.9 mm in maximal diameter. Fatty liver with hepatomegaly.  (05/15/2015) CT Abdomen and Pelvis with contrast impression: Normal appendix. Descending and sigmoid colon diverticula. No diverticulitis. Borderline pancreatic ductal dilation. No biliary ductal dilation. Further evaluation with MRI of the abdomen and MRCP may be obtained.;   Fecal incontinence : Admit random episodes of fecal incontinence with associated fecal urgency.   Last visit (7/22/2020) Continue to experience Fecal incontinence episodes daily.   Symptoms are associated with diarrhea episodes, at least 5-6 times a day.  Last visit (06/24/2020) Continue to experience Fecal incontinence episodes daily associated with diarrhea episodes, 5+ times a day..  She has been wearing under pads due to symptoms.   Last visit (5/11/2020) Admit having fecal incontinence while sitting last week. She has a Cystocele/Rectocele since 2016 and report having Vaginal Pessary Inserted in (2019) performed by Dr. Edie Holden.   Last visit (3/31/2020) Continue to experience Fecal incontinence episodes daily associated with diarrhea episodes.  She has been wearing under pads due to symptoms.    Of note, patient contact the office on (3/13/2020) with c/o 5-6 loose and watery BM's per day and episodes of fecal incontinence since 2 days after her last visit.  Described as full fecal seepage that occur with bending over or walking.      Last visit (3/3/2020) Patient denies episodes of Fecal incontinence since last office visit.  Last visit (1/15/2019) Patient denies experiencing any fecal incontinence episodes since her last office visit.    10/16/18 Patient states that she has had improvement of symptoms. She has had less episodes since having a pessary device inserted.  09/18/2018 Patient continues to have episodes of fecal incontinence daily.   08/22/2018 She continue to experience fecal incontinence. Symptoms occur 1-2 times per day.  She continue to ware a pad in her under garment, because she does not feel the leakage.    Last visit (7/18/2018)Patient continues to have fecal incontinence.   Last visit (6/26/2018) Patient continues to have fecal incontinence. She states that she has seen Dr. Butt since her last visit. Of note, this was prior to her having diarrhea. At the time of her consultation he diagnosed her with cystocele and rectocele. In addition, they discussed her having a device placed in her back to help control her sphincter.   04/24/2018 Continues to have fecal incontinence and will be following up with Dr. ATUL Butt Continue to experience Fecal incontinence episodes daily associated with diarrhea episodes.  she has been wearing under pads due to symptoms.    Of note, patient contact the office on (3/13/2020) with c/o 5-6 loose and watery BM's per day and episodes of fecal incontinence since 2 days after her last visit.  Described as full fecal seepage that occur with bending over or walking.      Last visit (3/3/2020) Patient denies episodes of Fecal incontinence since last office visit.  Last visit (1/15/2019) Patient denies experiencing any fecal incontinence episodes since her last office visit.    10/16/18 Patient states that she has had improvement of symptoms. She has had less episodes since having a pessary device inserted.  09/18/2018 Patient continues to have episodes of fecal incontinence daily.   08/22/2018 She continue to experience fecal incontinence. Symptoms occur 1-2 times per day.  She continue to ware a pad in her under garment, because she does not feel the leakage.    Last visit (7/18/2018)Patient continues to have fecal incontinence.   Last visit (6/26/2018) Patient continues to have fecal incontinence. She states that she has seen Dr. Butt since her last visit. Of note, this was prior to her having diarrhea. At the time of her consultation he diagnosed her with cystocele and rectocele. In addition, they discussed her having a device placed in her back to help control her sphincter.   04/24/2018 Continues to have fecal incontinence and will be following up with Dr. ATUL Butt ;   Abnormal MRI : Visit (1/15/2019) Patient admits that she has had a heart test completed as well as a MRI of her neck completed in December and she has not yet received the results.  10/16/2018 Denie any recent imaging completed.  09/18/2018 No recent imaging completed.  04/24/2018 Patient presents today for a follow up of her pancreas  divisum which was diagnosed via MRI Abdomen May 29, 2015.  She notes of having low back pain in May 2015 and was seen by her PCP who subsequently ordered an MRI Abdomen w/wo contrast.  (5/29/2015) MR Abdomen w/wo contrast impression: Pancreas divisum with separate drainage of the pancreatic duct and common bile duct ar the duodenum. Mild, diffuse pancreatic ductal dilation without obstructing mass or calculus.    Last visit (1/15/2019) Patient admits that she has had a heart test completed as well as a MRI of her neck completed in December and she has not yet received the results.  10/16/18 No recent imaging completed.  09/18/2018 No recent imaging completed.  04/24/2018 Patient presents today for a follow up of her pancreas  divisum which was diagnosed via MRI Abdomen May 29, 2015.  She notes of having low back pain in May 2015 and was seen by her PCP who subsequently ordered an MRI Abdomen w/wo contrast.  (5/29/2015) MR Abdomen w/wo contrast impression: Pancreas divisum with separate drainage of the pancreatic duct and common bile duct ar the duodenum. Mild, diffuse pancreatic ductal dilation without obstructing mass or calculus. ;   Dysphagia : She has a globus sensation that occur daily.  Denies dysphagia.    Last visit (7/22/2020)  She has a globus sensation since the past three days with regurgitation .   Patient can not recall starting. in her words ( It doesn't matter now ) Famotidine 20 mg po QHS 30-1    She continue to deny episodes of dysphagia Last visit (5/11/2020) She continue to deny episodes of dysphagia  Last visit (3/31/2020) She continue to deny episodes of dyspragia.  Last visit (3/3/2020) Patient denies episodes of dyspragia since last office visit.  Last visit (1/15/2019) Patient denies any episodes of dysphagia since her EGD with dilation was completed.   10/16/18 Patient continues to deny episodes of dysphagia.   09/18/2018 Patient continues to deny episodes of dysphagia.    08/22/2018 Continue to deny dysphagia since EGD w/dilation.    Last visit (7/18/2018) Patient denies recent episodes of dysphagia.  Last visit (6/26/2018) Patient denies recent episodes of dysphagia.  04/24/2018 Continues to have dysphagia with solid food , upper esophagus , few times a week    At last visit (10/31/2017)Difficulty in swallowing since the past year, few times a month associated with dryness of mouth due to Sjogren's She continue to deny episodes of dyspragia.  Last visit (3/3/2020) Patient denies episodes of dyspragia since last office visit.  Last visit (1/15/2019) Patient denies any episodes of dysphagia since her EGD with dilation was completed.   10/16/18 Patient continues to deny episodes of dysphagia.   09/18/2018 Patient continues to deny episodes of dysphagia.    08/22/2018 Continue to deny dysphagia since EGD w/dilation.    Last visit (7/18/2018) Patient denies recent episodes of dysphagia.  Last visit (6/26/2018) Patient denies recent episodes of dysphagia.  04/24/2018 Continues to have dysphagia with solid food , upper esophagus , few times a week    At last visit (10/31/2017)Difficulty in swallowing since the past year, few times a month associated with dryness of mouth due to Sjogren's    ;   Fatty Liver :     Last visit (7/22/2020) She continue to deny  jaundice, chills, RUQ pains, dizziness, or light headedness   Last visit (3/31/2020) She continue to deny  jaundice, chills, RUQ pains, dizziness, or light headedness. Admit still feeling fatigue she is unsure if this has to do with her Liver or her Sjogren's Disease.   Last visit (3/3/2020) Patient denies jaundice, chills, RUQ pains, dizziness, or light headedness. Admits still feeling fatigue she is unsure if this has to do with her Liver or her Sjrogen's Disease.  Last visit (01/15/2019) Patient currently denies jaundice, chills, RUQ pains, dizziness, or light headedness. Admits still feeling fatigue.    10/16/18 Patient was diagnosed with fatty liver in 2017.  She currently denies jaundice, chills, RUQ pains, dizziness, or light headedness Admit easy bruising over past few months, which is being follow by Hematologist Dr. Hakeem Fallon.  Admit several year history of feeling fatigue.    09/18/2018 Patient was diagnosed with fatty liver in 2017.  She currently denies jaundice, chills, RUQ pains, dizziness, or light headedness Admit easy bruising over past few months, which is being follow by Hematologist Dr. Hakeem Fallon.  Admit several year history of feeling fatigue.   08/22/2018 Patient was diagnosed with a fatty liver in 2017. Most recent labs with PCP Dr. Michelle Naidu a month ago due to skin rash from an allergic reaction from an unknown source. Labs completed (7/12/2018) revealed CBC and CMP within normal limits.   She currently denies jaundice, chills, RUQ pains, dizziness, or light headedness Admit easy bruising over past few months, which is being follow by Hematologist Dr. Hakeem Fallon.  Admit several year history of feeling fatigue.    04/24/2018 Patient was found to have fatty liver via CT abdomen on May 3, 2017.  Most recent labs with PCP Dr. Michelle Naidu  Aug. 23, 2017 at which time patient states her liver enzymes were fine, just had elevated calcium levels.  Patient currently denies jaundice, chills, RUQ pains, dizziness, or light headedness Admit easy bruising over past few months, which is being follow by Hematologist Dr. Hakeem Fallon.  Admit several year history of feeling fatigue.   Outside Labs:  (5/3/2016) Glucose 105 (H), Creatinine 1.16 (H), Protein Total 8.5 (H), ALT 36 (N), AST 20 (WNL), Albumin 4.5 (N), Total Bilirubin 0.6 (N), Total Calcium 10.6 (H), Alkaline Phosphatase 65 (N), Lipase 391 (N), WBC 7.2 (N), RBC 4.98 (N), Hemoglobin 15.4 (N), Hematocrit 44.4 (H), Platelet 345 (N).  (10/31/2017)NAFLD Score: -0.979   &lt; -1.455: predictor of absence of significant fibrosis (F0-F2 fibrosis) ? -1.455 to ? 0.675: indeterminate score > 0.675: predictor of presence of significant fibrosis (F3-F4 fibrosis)     Outside Imaging: (5/3/2016) CT Pelvis with impression: No acute intra-abdominal process. Colonic diverticulosis without diverticulitis. Normal appendix. No bowel obstruction. Persistent prominent appearance of the pancreatic duct measuring up to 3.9 mm in maximal diameter. Fatty liver with hepatomegaly.  (05/15/2015) CT Abdomen and Pelvis with contrast impression: Normal appendix. Descending and sigmoid colon diverticula. No diverticulitis. Borderline pancreatic ductal dilation. No biliary ductal dilation. Further evaluation with MRI of the abdomen and MRCP may be obtained. She continue to deny  jaundice, chills, RUQ pains, dizziness, or light headedness. Admit still feeling fatigue she is unsure if this has to do with her Liver or her Sjrogen's Disease.   Last visit (3/3/2020) Patient denies jaundice, chills, RUQ pains, dizziness, or light headedness. Admits still feeling fatigue she is unsure if this has to do with her Liver or her Sjrogen's Disease.  Last visit (01/15/2019) Patient currently denies jaundice, chills, RUQ pains, dizziness, or light headedness. Admits still feeling fatigue.    10/16/18 Patient was diagnosed with fatty liver in 2017.  She currently denies jaundice, chills, RUQ pains, dizziness, or light headedness Admit easy bruising over past few months, which is being follow by Hematologist Dr. Hakeem Fallon.  Admit several year history of feeling fatigue.    09/18/2018 Patient was diagnosed with fatty liver in 2017.  She currently denies jaundice, chills, RUQ pains, dizziness, or light headedness Admit easy bruising over past few months, which is being follow by Hematologist Dr. Hakeem Fallon.  Admit several year history of feeling fatigue.   08/22/2018 Patient was diagnosed with a fatty liver in 2017. Most recent labs with PCP Dr. Michelle Naidu a month ago due to skin rash from an allergic reaction from an unknown source. Labs completed (7/12/2018) revealed CBC and CMP within normal limits.   She currently denies jaundice, chills, RUQ pains, dizziness, or light headedness Admit easy bruising over past few months, which is being follow by Hematologist Dr. Hakeem Fallon.  Admit several year history of feeling fatigue.    04/24/2018 Patient was found to have fatty liver via CT abdomen on May 3, 2017.  Most recent labs with PCP Dr. Michelle Naidu  Aug. 23, 2017 at which time patient states her liver enzymes were fine, just had elevated calcium levels.  Patient currently denies jaundice, chills, RUQ pains, dizziness, or light headedness Admit easy bruising over past few months, which is being follow by Hematologist Dr. Hakeem Fallon.  Admit several year history of feeling fatigue.   Outside Labs:  (5/3/2016) Glucose 105 (H), Creatinine 1.16 (H), Protein Total 8.5 (H), ALT 36 (N), AST 20 (WNL), Albumin 4.5 (N), Total Bilirubin 0.6 (N), Total Calcium 10.6 (H), Alkaline Phosphatase 65 (N), Lipase 391 (N), WBC 7.2 (N), RBC 4.98 (N), Hemoglobin 15.4 (N), Hematocrit 44.4 (H), Platelet 345 (N).  (10/31/2017)NAFLD Score: -0.979   &lt; -1.455: predictor of absence of significant fibrosis (F0-F2 fibrosis) ? -1.455 to ? 0.675: indeterminate score > 0.675: predictor of presence of significant fibrosis (F3-F4 fibrosis)     Outside Imaging: (5/3/2016) CT Pelvis with impression: No acute intra-abdominal process. Colonic diverticulosis without diverticulitis. Normal appendix. No bowel obstruction. Persistent prominent appearance of the pancreatic duct measuring up to 3.9 mm in maximal diameter. Fatty liver with hepatomegaly.  (05/15/2015) CT Abdomen and Pelvis with contrast impression: Normal appendix. Descending and sigmoid colon diverticula. No diverticulitis. Borderline pancreatic ductal dilation. No biliary ductal dilation. Further evaluation with MRI of the abdomen and MRCP may be obtained. ;   Diarrhea : Currently admit 3 normal and formed evacuations and 1 episodes of watery bowel movements.  Admit associated episodes of fecal incontinence.  She continue to take Continue Welchol 625 MG, 3 BID.  She did not submit the stool study as ordered by Dr. Gallagher yesterday.   Last visit (7/22/2020) Patient admits to 5-6 bowel episodes of diarrhea a day.   Of note, patient contact the office on (7/6/2020) stating she completed the Dificid last week and continue to take Welchol 625 MG She denies diarrhea episodes over the past 2 weeks.  Stools are small semi-formed.  Denies straining. She want to discuss fecal transplant via pill form and Interstim.        Last visit (06/24/2020)  Patient presents today for a follow up of diarrhea, review MRI, and QDx results.  Patient was notified on (6/3/2020) QDx stool study was positive for C. Diff, subsequently she was treated with Vancomycin 125 mg po QID for 10 days.  Patient contact the office on (6/15/2020) c/o symptoms are no better. Also continue to experience abdominal pain and fecal incontinence 5+ times a day.    She also want to know if the cholestyramine come in a tablet form, because it say it can damage teeth, which she already has risk of dental damage from her Sjogren's.   Subsequently, patient was treated with Dificid 200 mg po BID for 10 days. Welchol 625 mg III po BID and Imodium - 1-2,  4 times a day prn.  She has not taken Imodium.   She admit bowel movements were getting back to normal last week over the course of 3 days while on a bland diet with having 3 normal formed BM's per day.  While off the diet she would have 3-5 BM's per day with a couple episodes fecal incontinence.  Denies melena, blood or mucus in stools.     Last visit (5/11/2020) Admit 5-6 loose BM's per day with fecal seepage and LLQ abdominal pain. Symptoms were improved then began to increase several weeks ago.  Denies melena, blood or mucus in stools.  Denies pruritus ani, rectal pain or bleeding.    She report maybe 1-2 days a week she will have normal and formed bowel movements.     Last visit (3/31/2020) Admit 5-6 bowel movements per day with 2-3 episodes of diarrhea at least once a day since last office visit.  Denies melena, blood or mucus in stools.  Of note, patient contact the office on (3/13/2020) with c/o 5-6 loose and watery BM's per day and episodes of fecal incontinence since 2 days after her last visit.  Described as full fecal seepage that occur with bending over or walking.    Subsequently a stool study was ordered, treated with Flagyl 500 mg TID, Bactrim DS BID x 10 days, take Imodium prn with caution.  She completed the 10 day course of Flagyl 500 mg TID, Bactrim DS BID on (3/22/2020).   Last visit (3/3/2020)She reports 2-3 bowel movements a day with no strain. She admits her stools are formed with no bleeding, melena, or mucus at this time.   Last visit (1/15/2019) Patient presents today for a follow up of diarrhea. Patient admits rarely experiencing diarrhea since her last office visit. Patient admits 1-3 bowel movements a day. Stools are normal in form.   Patient admits that she is scheduled to see Dr Holden tomorrow.       Patient denies the use of Welchol Tablet, 625 MG, 2 tablets prn with meals, Orally, TID. Patient states that she wanted to clear her body from the medications that she was taking.   Patient denies the use of Dicyclomine HCl Capsule, 10 MG, 1 tablet prn, Orally, TID. Patient states that she felt that this medication wasn't helping her so she stopped taking it.    10/16/18 Patient presents today for a follow up of diarrhea. She states there has been an improvement of symptoms and she admits to having 2-3 bowel movements per day. Ranging from solid to loose stools.   Patient was evaluated by Dr. Navin vernon Urogynecologist at [facility] who administered a pessary, which is a prosthetic device that can be inserted into the vagina to support its internal structure. She was advised this device tends to improve diarrhea and fecal incontinence in patients that have rectocele. Patient also has started Pelvic Floor Therapy and has completed two sessions.   Of note, patient never started Viberzi Tablet, 75 MG.   09/18/2018 Patient presents today for a follow up of diarrhea. She continues to have episodes of watery/loose stools daily. She states she can have up to 5-6 bowel movements per day. She denies blood, mucous or melena.    08/22/2018 Patient presents today to review QDx stool study, lab results and follow up of diarrhea. She has been taking Welchol 625 MG, 2 BID with some improvement in bowel habits.  Currently admit 2-3 defecations per day.  Stools are 50% of the time normal formed and loose/watery.  Denies melena, blood or mucus in stools.  Denies fecal urgency.  Continue to admit episodes of fecal incontinence.      Last visit (7/18/2018) Patient presents today for a follow up after her colonoscopy, EGD and labs.  She denies any complications after her procedure. She continues to have 3-4 bowel movements per day ranging from solid to watery/loose. She denies mucous or melena. She does admit to occasional bright red blood present on toilet paper following a bowel movement. She denies taking  Imodium A-D or Hyoscyamine.  Last visit (6/26/2018) Patient presents today for a consultation of diarrhea since the past 6 months . She states that symptoms started around June 9 th, 2018 after eating dinner. She was evaluated by [facility] on Hannah 10, 2018 for her symptoms. At that time stool studies were completed. She is currently having approximately five watery/loose stools per day. She denies any blood, mucous or melena. However, patient admits the amount of stools may increase if she consumes more food. She has been avoiding eating because that triggers her to have a bowel movement. She has had an eight pound unintentional weight loss since her last visit on 04/24/2018.    She did have an MRI of the abdomen completed on 04/06/2018 which revealed subtle sludge or concentrated bile within the gallbladder.  Per patient her last colonoscopy was 2-3 years ago. She denies having colon polyps at that time.  Of, note patient was prescribed Keflex on May 20, 2018. Admit 5-6 bowel movements per day with 2-3 episodes of diarrhea at least once a day since last office visit.  Denies melena, blood or mucus in stools.  Of note, patient contact the office on (3/13/2020) with c/o 5-6 loose and watery BM's per day and episodes of fecal incontinence since 2 days after her last visit.  Described as full fecal seepage that occur with bending over or walking.    Subsequently a stool study was ordered, treated with Flagyl 500 mg TID, Bactrim DS BID x 10 days, take Imodium prn with caution.  She completed the 10 day course of Flagyl 500 mg TID, Bactrim DS BID on (3/22/2020).   Last visit (3/3/2020)She reports 2-3 bowel movements a day with no strain. She admits her stools are formed with no bleeding, melena, or mucus at this time.   Last visit (1/15/2019) Patient presents today for a follow up of diarrhea. Patient admits rarely experiencing diarrhea since her last office visit. Patient admits 1-3 bowel movements a day. Stools are normal in form.   Patient admits that she is scheduled to see Dr Holden tomorrow.       Patient denies the use of Welchol Tablet, 625 MG, 2 tablets prn with meals, Orally, TID. Patient states that she wanted to clear her body from the medications that she was taking.   Patient denies the use of Dicyclomine HCl Capsule, 10 MG, 1 tablet prn, Orally, TID. Patient states that she felt that this medication wasn't helping her so she stopped taking it.    10/16/18 Patient presents today for a follow up of diarrhea. She states there has been an improvement of symptoms and she admits to having 2-3 bowel movements per day. Ranging from solid to loose stools.   Patient was evaluated by Dr. Holden a Urogynecologist at [facility] who administered a pessary, which is a prosthetic device that can be inserted into the vagina to support its internal structure. She was advised this device tends to improve diarrhea and fecal incontinence in patients that have rectocele. Patient also has started Pelvic Floor Therapy and has completed two sessions.   Of note, patient never started Viberzi Tablet, 75 MG.   09/18/2018 Patient presents today for a follow up of diarrhea. She continues to have episodes of watery/loose stools daily. She states she can have up to 5-6 bowel movements per day. She denies blood, mucous or melena.    08/22/2018 Patient presents today to review QDx stool study, lab results and follow up of diarrhea. She has been taking Welchol 625 MG, 2 BID with some improvement in bowel habits.  Currently admit 2-3 defecations per day.  Stools are 50% of the time normal formed and loose/watery.  Denies melena, blood or mucus in stools.  Denies fecal urgency.  Continue to admit episodes of fecal incontinence.      Last visit (7/18/2018) Patient presents today for a follow up after her colonoscopy, EGD and labs.  She denies any complications after her procedure. She continues to have 3-4 bowel movements per day ranging from solid to watery/loose. She denies mucous or melena. She does admit to occasional bright red blood present on toilet paper following a bowel movement. She denies taking  Imodium A-D or Hyoscyamine.  Last visit (6/26/2018) Patient presents today for a consultation of diarrhea since the past 6 months . She states that symptoms started around June 9 th, 2018 after eating dinner. She was evaluated by [facility] on Hannah 10, 2018 for her symptoms. At that time stool studies were completed. She is currently having approximately five watery/loose stools per day. She denies any blood, mucous or melena. However, patient admits the amount of stools may increase if she consumes more food. She has been avoiding eating because that triggers her to have a bowel movement. She has had an eight pound unintentional weight loss since her last visit on 04/24/2018.    She did have an MRI of the abdomen completed on 04/06/2018 which revealed subtle sludge or concentrated bile within the gallbladder.  Per patient her last colonoscopy was 2-3 years ago. She denies having colon polyps at that time.  Of, note patient was prescribed Keflex on May 20, 2018.;   Hospital Follow Up : Patient presented to [facility] on (7/23/2020) at the advise of our office due to uncontrolled severe abdominal pain.    CT Abd/Pelvis with contrast was performed revealing No findings to account for abdominal pain   Patient was discharged (7/23/2020) after labs, ct reviewed. Dr. Shah, radiology consulted advising mesenteric vessels unremarkable. Based on results, pt will be d/c with outpt follow up to ob/gyn and GI, Dr Ivory.     Last visit (7/22/2020)  Denies any hospitalization since last visit.  Last visit (3/31/2020) Denies any hospitalization since last visit.   Patient presented to [facility] ED June 22, 2019 with abdominal pain.  Onset 5 days prior to ER.  Described as a constant pain located right abdomen and flank area.  Denies any aggravating factors. Denies any hospitalization since.     Patient presented to [facility] ED June 22, 2019 with abdominal pain.  Onset 5 days prior to ER.  Described as a constant pain located right abdomen and flank area.  Denies any aggravating factors.;

## 2020-09-15 ENCOUNTER — OFFICE VISIT (OUTPATIENT)
Dept: URBAN - METROPOLITAN AREA CLINIC 35 | Facility: CLINIC | Age: 60
End: 2020-09-15

## 2020-09-15 VITALS — HEART RATE: 85 BPM | OXYGEN SATURATION: 97 % | BODY MASS INDEX: 24.75 KG/M2 | WEIGHT: 145 LBS | HEIGHT: 64 IN

## 2020-09-15 RX ORDER — HYDROXYCHLOROQUINE SULFATE 200 MG/1
1 TABLET WITH FOOD OR MILK TABLET ORAL BID
Status: ACTIVE | COMMUNITY

## 2020-09-15 RX ORDER — HYDROCORTISONE ACETATE 0.5 %
AS DIRECTED CREAM (GRAM) TOPICAL
Status: ACTIVE | COMMUNITY

## 2020-09-15 RX ORDER — NITROFURANTOIN MONOHYDRATE/MACROCRYSTALLINE 25; 75 MG/1; MG/1
1 CAPSULE AT BEDTIME WITH FOOD CAPSULE ORAL ONCE A DAY
Qty: 30 | Status: ON HOLD | COMMUNITY

## 2020-09-15 RX ORDER — SPIRONOLACTONE 25 MG/1
1 TABLET TABLET ORAL ONCE A DAY
Status: ACTIVE | COMMUNITY

## 2020-09-15 RX ORDER — SULFAMETHOXAZOLE AND TRIMETHOPRIM 800; 160 MG/1; MG/1
1 TABLET TABLET ORAL TWICE A DAY
Qty: 20 | Refills: 0 | Status: ON HOLD | COMMUNITY
Start: 2020-03-13

## 2020-09-15 RX ORDER — MONTELUKAST SODIUM 10 MG/1
1 TABLET IN THE EVENING TABLET, FILM COATED ORAL ONCE A DAY
Status: ACTIVE | COMMUNITY

## 2020-09-15 RX ORDER — SACCHAROMYCES BOULARDII 50 MG
1 CAPSULE CAPSULE ORAL TWICE A DAY
OUTPATIENT

## 2020-09-15 RX ORDER — MESALAMINE 1000 MG/1
1 SUPPOSITORY SUPPOSITORY RECTAL BID
Qty: 60 | Refills: 1 | Status: ON HOLD | COMMUNITY
Start: 2018-07-13

## 2020-09-15 RX ORDER — BUPROPION HYDROCHLORIDE 300 MG/1
1 TABLET IN THE MORNING TABLET, EXTENDED RELEASE ORAL ONCE A DAY
Status: ACTIVE | COMMUNITY

## 2020-09-15 RX ORDER — LACTOBACILLUS RHAMNOSUS GG 10B CELL
1 CAPSULE CAPSULE ORAL ONCE A DAY
Status: DISCONTINUED | COMMUNITY

## 2020-09-15 RX ORDER — GABAPENTIN 100 MG/1
1 CAPSULE CAPSULE ORAL ONCE A DAY
Status: ON HOLD | COMMUNITY

## 2020-09-15 RX ORDER — ELUXADOLINE 75 MG/1
1 TABLET WITH FOOD TABLET, FILM COATED ORAL TWICE A DAY
Status: ON HOLD | COMMUNITY
Start: 2018-09-18

## 2020-09-15 RX ORDER — IBUPROFEN 800 MG/1
TAKE ONE TABLET BY MOUTH THREE TIMES A DAY TABLET, FILM COATED ORAL
Qty: 90 UNSPECIFIED | Refills: 0 | Status: ACTIVE | COMMUNITY

## 2020-09-15 RX ORDER — SACCHAROMYCES BOULARDII 50 MG
1 CAPSULE CAPSULE ORAL TWICE A DAY
Qty: 60 | Status: ACTIVE | COMMUNITY

## 2020-09-15 RX ORDER — ESOMEPRAZOLE MAGNESIUM 40 MG/1
1 CAPSULE CAPSULE, DELAYED RELEASE ORAL ONCE A DAY
Status: ON HOLD | COMMUNITY

## 2020-09-15 RX ORDER — NEBIVOLOL HYDROCHLORIDE 5 MG/1
1 TABLET TABLET ORAL ONCE A DAY
Status: ACTIVE | COMMUNITY

## 2020-09-15 RX ORDER — VORTIOXETINE 10 MG/1
1 TABLET TABLET, FILM COATED ORAL ONCE A DAY
Status: ACTIVE | COMMUNITY

## 2020-09-15 RX ORDER — ASCORBIC ACID 125 MG
AS DIRECTED TABLET,CHEWABLE ORAL
Status: ACTIVE | COMMUNITY

## 2020-09-15 RX ORDER — CELECOXIB 100 MG/1
1 CAPSULE WITH FOOD CAPSULE ORAL TWICE A DAY
Qty: 28 CAPSULE | Refills: 0 | Status: ON HOLD | COMMUNITY
Start: 2020-07-24

## 2020-09-15 RX ORDER — ZOLPIDEM TARTRATE 5 MG/1
(SCHEDULE IV DRUG) TAKE ONE TABLET BY MOUTH AT BEDTIME TABLET, COATED ORAL
Qty: 30 UNSPECIFIED | Refills: 0 | Status: ACTIVE | COMMUNITY

## 2020-09-15 RX ORDER — LEVOMEFOLATE/ALGAL OIL 15-90.314
1 CAPSULE CAPSULE ORAL ONCE A DAY
Status: ACTIVE | COMMUNITY

## 2020-09-15 RX ORDER — COLESEVELAM HYDROCHLORIDE 625 MG/1
3 TABLETS WITH MEALS TABLET, FILM COATED ORAL TWICE A DAY
Status: ON HOLD | COMMUNITY
Start: 2020-06-15

## 2020-09-15 RX ORDER — DICYCLOMINE HYDROCHLORIDE 10 MG/1
1 CAPSULE CAPSULE ORAL
Qty: 30 | Refills: 1 | Status: DISCONTINUED | COMMUNITY
Start: 2020-07-06

## 2020-09-15 RX ORDER — FIDAXOMICIN 200 MG/1
1 TABLET TABLET, FILM COATED ORAL TWICE A DAY
Qty: 10 TABLET | Status: ON HOLD | COMMUNITY
Start: 2020-06-24

## 2020-09-15 RX ORDER — B-COMPLEX WITH VITAMIN C
1 TABLET TABLET ORAL ONCE A DAY
Status: ACTIVE | COMMUNITY

## 2020-09-15 RX ORDER — TRAMADOL HYDROCHLORIDE 50 MG/1
TABLET, FILM COATED ORAL TWICE DAILY
Status: ACTIVE | COMMUNITY

## 2020-09-15 RX ORDER — CHOLECALCIFEROL TAB 50 MCG (2000 UNIT) 50 MCG
1 TABLET TAB ORAL ONCE A DAY
Status: ACTIVE | COMMUNITY

## 2020-09-15 RX ORDER — ALPRAZOLAM 2 MG/1
1 TABLET TABLET, EXTENDED RELEASE ORAL TWICE A DAY
Status: ACTIVE | COMMUNITY

## 2020-09-15 RX ORDER — HYOSCYAMINE SULFATE 0.125 MG
1 TABLET AS NEEDED TABLET ORAL
Qty: 60 TABLET | Refills: 1 | Status: ON HOLD | COMMUNITY
Start: 2018-06-27

## 2020-09-15 RX ORDER — HYDROXYCHLOROQUINE SULFATE 200 MG/1
1 TABLET WITH FOOD OR MILK TABLET ORAL TWICE A DAY
Status: ON HOLD | COMMUNITY

## 2020-09-15 RX ORDER — CHOLESTYRAMINE 4 G/9G
1 PACKET MIXED WITH WATER OR NON-CARBONATED DRINK POWDER, FOR SUSPENSION ORAL TWICE A DAY
Qty: 60 | Refills: 1 | Status: ON HOLD | COMMUNITY
Start: 2020-06-11

## 2020-09-15 RX ORDER — FIDAXOMICIN 200 MG/1
1 TABLET TABLET, FILM COATED ORAL TWICE A DAY
Qty: 20 | Refills: 0 | Status: ON HOLD | COMMUNITY
Start: 2020-06-15

## 2020-09-15 RX ORDER — RIZATRIPTAN BENZOATE 5 MG/1
1 TABLET ON THE TONGUE AND ALLOW TO DISSOLVE AS NEEDED ONE TIME TABLET, ORALLY DISINTEGRATING ORAL ONCE A DAY
Status: ON HOLD | COMMUNITY

## 2020-09-15 RX ORDER — MULTIVIT-MIN/IRON/FOLIC ACID/K 18-600-40
AS DIRECTED CAPSULE ORAL
Status: ACTIVE | COMMUNITY

## 2020-09-15 RX ORDER — MAGNESIUM OXIDE/MAG AA CHELATE 300 MG
1 CAPSULE WITH A MEAL CAPSULE ORAL ONCE A DAY
Status: ACTIVE | COMMUNITY

## 2020-09-15 RX ORDER — VANCOMYCIN HYDROCHLORIDE 125 MG/1
1 CAPSULE CAPSULE ORAL
Qty: 40 | Refills: 0 | Status: ON HOLD | COMMUNITY
Start: 2020-06-03

## 2020-09-15 RX ORDER — PRAVASTATIN SODIUM 20 MG/1
1 TABLET TABLET ORAL ONCE A DAY
Status: ACTIVE | COMMUNITY

## 2020-09-15 RX ORDER — METRONIDAZOLE 500 MG/1
1 TABLET TABLET, FILM COATED ORAL THREE TIMES A DAY
Qty: 30 | Refills: 0 | Status: ON HOLD | COMMUNITY
Start: 2020-03-12

## 2020-09-15 RX ORDER — DESIPRAMINE HYDROCHLORIDE 25 MG/1
1 TABLET TABLET, FILM COATED ORAL ONCE A DAY
Qty: 30 | Status: DISCONTINUED | COMMUNITY
Start: 2020-07-28

## 2020-09-15 RX ORDER — CIPROFLOXACIN HCL 500 MG
1 TABLET TABLET ORAL
Qty: 20 | Refills: 0 | Status: ON HOLD | COMMUNITY
Start: 2020-03-12

## 2020-09-15 NOTE — HPI-MIGRATED HPI
;   ;   ;   ;   ;   ;   ;     Abdominal Pain : Abdominal pain episode after consuming pizza and banana pepper. Symptoms last for a few minutes then dissipate on it's own.   Last visit (7/28/2020) Patient presents today via televisit with consent for a follow up of abdominal pain.  Pain is located just below and periumbilical.  Described as a dull ache that occur daily in an intermittent pattern.  She will wake up with diffuse lower back pain and abdominal pain. Back pain gradually dissipate over the course of an hour,  but the abdominal pain continue to persist.  She report no clear aggravating factor, but occasionally will get relief with laying on her right side.   She is taking Dicyclomine 10 mg 1 TID without significant benefit.  She did not try the Celebrex after reading the warning insert.   She has an appt. with her Urogynecologist on Friday.   Of note, [facility] at the advise of our office due to uncontrolled severe abdominal pain.   Last visit (7/22/2020) Of note, patient contact the office on (7/6/2020) stating she completed the Dificid last week and continue to take Welchol 625 MG and hyoscyamine, but continue to have lower abdominal pain.  Pain is constant and described as a cramping pain. She report the hyoscyamine is not as effective.    Symptoms start upon waking up in the mornings and persist throughout the day. Patient state "something's got to give".  Subsequently she was advised to  Stop Hyoscyamine and start Dicyclomine 10 mg po QID prn   Patient reports Dicyclomine 10 mg po QID prn is not effective.    Last visit (06/24/2020)  Patient contact the office on (5/15/2020) c/o continue to experience abdominal pain and fecal incontinence 5+ times a day.   She admit lower abdominal pain below the umbilicus. Symptoms are present most morning upon waking up and will persist throughout the day.  She has been taking Hyoscyamine 2-3 times a day with temporary relief.       Last visit (5/15/2020) Patient presents today via televisit with consent for a follow up of increase LLQ abdominal pain.   Pain has been present constant and described as a dull ache. Denies any aggravating factors.   Admit some relief with the use of Hyoscyamine 1 BID.  She contact the office on (3/8/2020) c/o 5-6 loose BM's per day with fecal seepage and LLQ abdominal pain. Symptoms were improved then began to increase several weeks ago.  Denies melena, blood or mucus in stools. Denies pruritus ani, rectal pain or bleeding.   Abdominal pain is described as a dull ache that occur randomly throughout the day and will last for minutes to hours.  She ran out of the Hyoscyamine weeks ago.  Denies fever or chills.  Subsequently patient was informed to go to ER if LLQ pain is persistent and worse.      Last visit (3/31/2020) Patient presents today via tele visit to review CT, labs, stool study and follow up of LLQ abdominal pain associated with diverticulitis.  She admit improvement of symptoms with the use of Hyoscyamine and Ibuprofen 800 mg prn .  Last episode occurred yesterday.   Described as a sharp pain, that is typically present each day upon waking in the mornings.  She completed treatment for diverticulitis, Bactrim DS and Flagyl on (3/22/2020)   Of note, patient contact the office on (3/13/2020) with c/o 5-6 loose and watery BM's per day and episodes of fecal incontinence since 2 days after her last visit.  Described as full fecal seepage that occur with bending over or walking.   She state she has been treated with 3 antibiotics since Jan. 2020- Cefdnir x 10 days in Jan. 2020 for URI, then Cipro 500 mg 1 BID x 7 days on (2/3/2020), which she took for 3 days then told to d/c and start Moxifloxacin 100 mg x 10 days on (2/11/2020) due to the Diverticulitis.   Subsequently a stool study was ordered, treated with Flagyl 500 mg TID, Bactrim DS BID x 10 days, take Imodium prn with caution.  On (3/19/2020) patient continue to c/o LLQ abdominal pain upon waking up in the mornings. Pain is sharp in nature.  She will take Hyoscyamine, but it will take 1-2 hours to get relief.   Last visit (3/3/2020) Patient presents today for a follow up of abdominal pain. She denies any episodes of abdominal pain since her last office visit.   Patient had a CT Chest/ABD/Pelvis completed on 2/5/2020 showing mild diverticulitis of the proximal descending colon, with mild moderate inflammatory changes, including fat stranding and trace fluid. Moderate diffuse diverticulosis of the colon, more extensively involving the left colon. Groundglass infiltrate in the posterior right upper lobe, suspicious for possible pneumonia. Mild hepatic steatosis.   Patient admits that she was on abx and steroids due to a respiratory infection in December   Last visit (1/15/2019) Patient admits experiencing abdominal pain rarely since her last office visit.    10/16/18 Patient marks improvement of abdominal pain. She states that she has had 4-5 episodes since her last office visit 09/18/2018.  09/18/2018 Patient continues to have abdominal pain daily. She describes episodes as a cramping sensation throughout her lower abdomen. She typically begins to have symptoms after consuming a meal.  She is unsure if symptoms are improved with Dicyclomine 10 mg BID.   Of note, Patient called the office on 09/05/2018 complaining of abdominal cramping and  fecal incontinence. As a result we increased her Welchol 625mg from 2 tabs po bid to 2 tabs po tid.   08/22/2018 She admit improvement of symptoms with the use of Dicyclomine 10 mg BID.    Of note, patient contact the office (8/1/2018) stating the Hyoscyamine wasn't as effective, subsequently she was changed to Dicyclomine 10 mg po TID prn.  Last visit (7/18/2018) Patient continues to have abdominal discomfort. She admits the pain is infrequent and ranges from the left to right side of her abdomen. She continues to deny any aggravating factors.    Last visit (6/26/2018) Patient continues to have right sided abdominal pain accompanied with diarrhea daily. She describes episodes as a severe cramping sensation. She denies any aggravating factors.    04/24/2018 Continues to have right sided abdominal pain , intermittent severity , constant . No clear aggravating or alleviating factors     At last visit (10/31/2017) Admits abdominal pain that is located mid region and occur in an intermittent pattern.  Onset Dec. 2016 right after her hemorrhoidectomy and never got better.  Described as a dull ache that last for minutes to hours at a time.   Symptoms are mostly noticed upon waking up.  Admit relief of symptoms after defecation.     Patient denies any episodes of RUQ abdominal pain this year.  Symptoms were described as a dull aching to crampy and sharp pain.  Pain was initially intermittently, then  began to  worsen first of May. Patient notes as symptoms progressively worsen she went to [facility] ER 5/3/2016 and at that time her pain was severe, level 8 /10.  The Location of pain at the time she was seen in ER was RUQ abdomen and radiated to right side of her back, and middle of the back. Pain was worse with inhalation.  She had CT Abdomen, Chest Xray and was treated with IV fluids, Morphine with great relief of symptoms. Patient was discharged with [place] 5/325 #12 and to follow up with GI, and PCP.  She denies any abdominal pain since the day after she discharged from [facility] ER.  Patient denies the use of antibiotics within the last (6) months. 	    Outside Labs:  (5/3/2016) Glucose 105 (H), Creatinine 1.16 (H), Protein Total 8.5 (H), ALT 36 (N), AST 20 (N), Albumin 4.5 (N), Total Bilirubin 0.6 (N), Total Calcium 10.6 (H), Alkaline Phosphatase 65 (N), Lipase 391 (N), WBC 7.2 (N), RBC 4.98 (N), Hemoglobin 15.4 (N), Hematocrit 44.4 (H), Platelet 345 (N).    Outside Imaging: (5/3/2016) CT Pelvis with impression: No acute intra-abdominal process. Colonic diverticulosis without diverticulitis. Normal appendix. No bowel obstruction. Persistent prominent appearance of the pancreatic duct measuring up to 3.9 mm in maximal diameter. Fatty liver with hepatomegaly.  (05/15/2015) CT Abdomen and Pelvis with contrast impression: Normal appendix. Descending and sigmoid colon diverticula. No diverticulitis. Borderline pancreatic ductal dilation. No biliary ductal dilation. Further evaluation with MRI of the abdomen and MRCP may be obtained.;   Fecal incontinence : She report having normal bowel habits over the past month without episodes of fecal incontinence or associated fecal urgency.   Last visit (7/28/2020) Admit random episodes of fecal incontinence with associated fecal urgency.   Last visit (7/22/2020) Continue to experience Fecal incontinence episodes daily.   Symptoms are associated with diarrhea episodes, at least 5-6 times a day.  Last visit (06/24/2020) Continue to experience Fecal incontinence episodes daily associated with diarrhea episodes, 5+ times a day..  She has been wearing under pads due to symptoms.   Last visit (5/11/2020) Admit having fecal incontinence while sitting last week. She has a Cystocele/Rectocele since 2016 and report having Vaginal Pessary Inserted in (2019) performed by Dr. Edie Holden.   Last visit (3/31/2020) Continue to experience Fecal incontinence episodes daily associated with diarrhea episodes.  She has been wearing under pads due to symptoms.    Of note, patient contact the office on (3/13/2020) with c/o 5-6 loose and watery BM's per day and episodes of fecal incontinence since 2 days after her last visit.  Described as full fecal seepage that occur with bending over or walking.      Last visit (3/3/2020) Patient denies episodes of Fecal incontinence since last office visit.  Last visit (1/15/2019) Patient denies experiencing any fecal incontinence episodes since her last office visit.    10/16/18 Patient states that she has had improvement of symptoms. She has had less episodes since having a pessary device inserted.  09/18/2018 Patient continues to have episodes of fecal incontinence daily.   08/22/2018 She continue to experience fecal incontinence. Symptoms occur 1-2 times per day.  She continue to ware a pad in her under garment, because she does not feel the leakage.    Last visit (7/18/2018)Patient continues to have fecal incontinence.   Last visit (6/26/2018) Patient continues to have fecal incontinence. She states that she has seen Dr. Butt since her last visit. Of note, this was prior to her having diarrhea. At the time of her consultation he diagnosed her with cystocele and rectocele. In addition, they discussed her having a device placed in her back to help control her sphincter.   04/24/2018 Continues to have fecal incontinence and will be following up with Dr. ATUL Butt Continue to experience Fecal incontinence episodes daily associated with diarrhea episodes.  she has been wearing under pads due to symptoms.    Of note, patient contact the office on (3/13/2020) with c/o 5-6 loose and watery BM's per day and episodes of fecal incontinence since 2 days after her last visit.  Described as full fecal seepage that occur with bending over or walking.      Last visit (3/3/2020) Patient denies episodes of Fecal incontinence since last office visit.  Last visit (1/15/2019) Patient denies experiencing any fecal incontinence episodes since her last office visit.    10/16/18 Patient states that she has had improvement of symptoms. She has had less episodes since having a pessary device inserted.  09/18/2018 Patient continues to have episodes of fecal incontinence daily.   08/22/2018 She continue to experience fecal incontinence. Symptoms occur 1-2 times per day.  She continue to ware a pad in her under garment, because she does not feel the leakage.    Last visit (7/18/2018)Patient continues to have fecal incontinence.   Last visit (6/26/2018) Patient continues to have fecal incontinence. She states that she has seen Dr. Butt since her last visit. Of note, this was prior to her having diarrhea. At the time of her consultation he diagnosed her with cystocele and rectocele. In addition, they discussed her having a device placed in her back to help control her sphincter.   04/24/2018 Continues to have fecal incontinence and will be following up with Dr. ATUL Butt ;   Abnormal MRI : Visit (1/15/2019) Patient admits that she has had a heart test completed as well as a MRI of her neck completed in December and she has not yet received the results.  10/16/2018 Denie any recent imaging completed.  09/18/2018 No recent imaging completed.  04/24/2018 Patient presents today for a follow up of her pancreas  divisum which was diagnosed via MRI Abdomen May 29, 2015.  She notes of having low back pain in May 2015 and was seen by her PCP who subsequently ordered an MRI Abdomen w/wo contrast.  (5/29/2015) MR Abdomen w/wo contrast impression: Pancreas divisum with separate drainage of the pancreatic duct and common bile duct ar the duodenum. Mild, diffuse pancreatic ductal dilation without obstructing mass or calculus.    Last visit (1/15/2019) Patient admits that she has had a heart test completed as well as a MRI of her neck completed in December and she has not yet received the results.  10/16/18 No recent imaging completed.  09/18/2018 No recent imaging completed.  04/24/2018 Patient presents today for a follow up of her pancreas  divisum which was diagnosed via MRI Abdomen May 29, 2015.  She notes of having low back pain in May 2015 and was seen by her PCP who subsequently ordered an MRI Abdomen w/wo contrast.  (5/29/2015) MR Abdomen w/wo contrast impression: Pancreas divisum with separate drainage of the pancreatic duct and common bile duct ar the duodenum. Mild, diffuse pancreatic ductal dilation without obstructing mass or calculus. ;   Dysphagia : Denies globus or dysphagia.   Last visit (7/28/2020) She has a globus sensation that occur daily.  Denies dysphagia.    Last visit (7/22/2020)  She has a globus sensation since the past three days with regurgitation .   Patient can not recall starting. in her words ( It doesn't matter now ) Famotidine 20 mg po QHS 30-1    She continue to deny episodes of dysphagia Last visit (5/11/2020) She continue to deny episodes of dysphagia  Last visit (3/31/2020) She continue to deny episodes of dyspragia.  Last visit (3/3/2020) Patient denies episodes of dyspragia since last office visit.  Last visit (1/15/2019) Patient denies any episodes of dysphagia since her EGD with dilation was completed.   10/16/18 Patient continues to deny episodes of dysphagia.   09/18/2018 Patient continues to deny episodes of dysphagia.    08/22/2018 Continue to deny dysphagia since EGD w/dilation.    Last visit (7/18/2018) Patient denies recent episodes of dysphagia.  Last visit (6/26/2018) Patient denies recent episodes of dysphagia.  04/24/2018 Continues to have dysphagia with solid food , upper esophagus , few times a week    At last visit (10/31/2017)Difficulty in swallowing since the past year, few times a month associated with dryness of mouth due to Sjogren's She continue to deny episodes of dyspragia.  Last visit (3/3/2020) Patient denies episodes of dyspragia since last office visit.  Last visit (1/15/2019) Patient denies any episodes of dysphagia since her EGD with dilation was completed.   10/16/18 Patient continues to deny episodes of dysphagia.   09/18/2018 Patient continues to deny episodes of dysphagia.    08/22/2018 Continue to deny dysphagia since EGD w/dilation.    Last visit (7/18/2018) Patient denies recent episodes of dysphagia.  Last visit (6/26/2018) Patient denies recent episodes of dysphagia.  04/24/2018 Continues to have dysphagia with solid food , upper esophagus , few times a week    At last visit (10/31/2017)Difficulty in swallowing since the past year, few times a month associated with dryness of mouth due to Sjogren's    ;   Fatty Liver : She denies jaundice, chills, RUQ pains, dizziness, or light headedness.  Admit increased fatigue over the past month and was experiencing feeling "lethargic".  She saw her Internist last week and report having an extensive amount of blood work completed, which she is awaiting results.    Last visit (7/22/2020) She continue to deny  jaundice, chills, RUQ pains, dizziness, or light headedness   Last visit (3/31/2020) She continue to deny  jaundice, chills, RUQ pains, dizziness, or light headedness. Admit still feeling fatigue she is unsure if this has to do with her Liver or her Sjogren's Disease.   Last visit (3/3/2020) Patient denies jaundice, chills, RUQ pains, dizziness, or light headedness. Admits still feeling fatigue she is unsure if this has to do with her Liver or her Sjogren's Disease.  Last visit (01/15/2019) Patient currently denies jaundice, chills, RUQ pains, dizziness, or light headedness. Admits still feeling fatigue.    10/16/18 Patient was diagnosed with fatty liver in 2017.  She currently denies jaundice, chills, RUQ pains, dizziness, or light headedness Admit easy bruising over past few months, which is being follow by Hematologist Dr. Hakeem Fallon.  Admit several year history of feeling fatigue.    09/18/2018 Patient was diagnosed with fatty liver in 2017.  She currently denies jaundice, chills, RUQ pains, dizziness, or light headedness Admit easy bruising over past few months, which is being follow by Hematologist Dr. Hakeem Fallon.  Admit several year history of feeling fatigue.   08/22/2018 Patient was diagnosed with a fatty liver in 2017. Most recent labs with PCP Dr. Michelle Naidu a month ago due to skin rash from an allergic reaction from an unknown source. Labs completed (7/12/2018) revealed CBC and CMP within normal limits.   She currently denies jaundice, chills, RUQ pains, dizziness, or light headedness Admit easy bruising over past few months, which is being follow by Hematologist Dr. Hakeem Fallon.  Admit several year history of feeling fatigue.    04/24/2018 Patient was found to have fatty liver via CT abdomen on May 3, 2017.  Most recent labs with PCP Dr. Michelle Naidu  Aug. 23, 2017 at which time patient states her liver enzymes were fine, just had elevated calcium levels.  Patient currently denies jaundice, chills, RUQ pains, dizziness, or light headedness Admit easy bruising over past few months, which is being follow by Hematologist Dr. Hakeem Fallon.  Admit several year history of feeling fatigue.   Outside Labs:  (5/3/2016) Glucose 105 (H), Creatinine 1.16 (H), Protein Total 8.5 (H), ALT 36 (N), AST 20 (WNL), Albumin 4.5 (N), Total Bilirubin 0.6 (N), Total Calcium 10.6 (H), Alkaline Phosphatase 65 (N), Lipase 391 (N), WBC 7.2 (N), RBC 4.98 (N), Hemoglobin 15.4 (N), Hematocrit 44.4 (H), Platelet 345 (N).  (10/31/2017)NAFLD Score: -0.979   &lt; -1.455: predictor of absence of significant fibrosis (F0-F2 fibrosis) ? -1.455 to ? 0.675: indeterminate score > 0.675: predictor of presence of significant fibrosis (F3-F4 fibrosis)     Outside Imaging: (5/3/2016) CT Pelvis with impression: No acute intra-abdominal process. Colonic diverticulosis without diverticulitis. Normal appendix. No bowel obstruction. Persistent prominent appearance of the pancreatic duct measuring up to 3.9 mm in maximal diameter. Fatty liver with hepatomegaly.  (05/15/2015) CT Abdomen and Pelvis with contrast impression: Normal appendix. Descending and sigmoid colon diverticula. No diverticulitis. Borderline pancreatic ductal dilation. No biliary ductal dilation. Further evaluation with MRI of the abdomen and MRCP may be obtained. She continue to deny  jaundice, chills, RUQ pains, dizziness, or light headedness. Admit still feeling fatigue she is unsure if this has to do with her Liver or her Sjrogen's Disease.   Last visit (3/3/2020) Patient denies jaundice, chills, RUQ pains, dizziness, or light headedness. Admits still feeling fatigue she is unsure if this has to do with her Liver or her Sjrogen's Disease.  Last visit (01/15/2019) Patient currently denies jaundice, chills, RUQ pains, dizziness, or light headedness. Admits still feeling fatigue.    10/16/18 Patient was diagnosed with fatty liver in 2017.  She currently denies jaundice, chills, RUQ pains, dizziness, or light headedness Admit easy bruising over past few months, which is being follow by Hematologist Dr. Hakeem Fallon.  Admit several year history of feeling fatigue.    09/18/2018 Patient was diagnosed with fatty liver in 2017.  She currently denies jaundice, chills, RUQ pains, dizziness, or light headedness Admit easy bruising over past few months, which is being follow by Hematologist Dr. Hakeem Fallon.  Admit several year history of feeling fatigue.   08/22/2018 Patient was diagnosed with a fatty liver in 2017. Most recent labs with PCP Dr. Michelle Naidu a month ago due to skin rash from an allergic reaction from an unknown source. Labs completed (7/12/2018) revealed CBC and CMP within normal limits.   She currently denies jaundice, chills, RUQ pains, dizziness, or light headedness Admit easy bruising over past few months, which is being follow by Hematologist Dr. Hakeem Fallon.  Admit several year history of feeling fatigue.    04/24/2018 Patient was found to have fatty liver via CT abdomen on May 3, 2017.  Most recent labs with PCP Dr. Michelle Naidu  Aug. 23, 2017 at which time patient states her liver enzymes were fine, just had elevated calcium levels.  Patient currently denies jaundice, chills, RUQ pains, dizziness, or light headedness Admit easy bruising over past few months, which is being follow by Hematologist Dr. Hakeem Fallon.  Admit several year history of feeling fatigue.   Outside Labs:  (5/3/2016) Glucose 105 (H), Creatinine 1.16 (H), Protein Total 8.5 (H), ALT 36 (N), AST 20 (WNL), Albumin 4.5 (N), Total Bilirubin 0.6 (N), Total Calcium 10.6 (H), Alkaline Phosphatase 65 (N), Lipase 391 (N), WBC 7.2 (N), RBC 4.98 (N), Hemoglobin 15.4 (N), Hematocrit 44.4 (H), Platelet 345 (N).  (10/31/2017)NAFLD Score: -0.979   &lt; -1.455: predictor of absence of significant fibrosis (F0-F2 fibrosis) ? -1.455 to ? 0.675: indeterminate score > 0.675: predictor of presence of significant fibrosis (F3-F4 fibrosis)     Outside Imaging: (5/3/2016) CT Pelvis with impression: No acute intra-abdominal process. Colonic diverticulosis without diverticulitis. Normal appendix. No bowel obstruction. Persistent prominent appearance of the pancreatic duct measuring up to 3.9 mm in maximal diameter. Fatty liver with hepatomegaly.  (05/15/2015) CT Abdomen and Pelvis with contrast impression: Normal appendix. Descending and sigmoid colon diverticula. No diverticulitis. Borderline pancreatic ductal dilation. No biliary ductal dilation. Further evaluation with MRI of the abdomen and MRCP may be obtained. ;   Diarrhea : Patient presents today for a follow up of diarrhea.  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  Treatment consisted of Dificid and Zinplava ( Infusion ), which she completed on (8/18/2020) with marked improvement of symptoms.  She report having normal bowel habits over the past month.  Currently admit 2-4 normal and formed bowel movements per day. Denies melena, blood or mucus in stools.     Patient state she had a follow up with Uro-Gynecologist Dr. Holden suggest she had a 2nd opinion with another GI.  Also she did a rectal exam and was told her sphincter muscle was a 5/10.  She is currently taking Dicyclomine HCl 10 MG and Welchol 3 BID.  She report having yellowish orange stool when she have fecal incontinence, which occur randomly.  Last episodes occured 5 days ago. Denies melena, blood or mucus in stools.      Last visit (7/28/2020) Currently admit 3 normal and formed evacuations and 1 episodes of watery bowel movements.  Admit associated episodes of fecal incontinence.  She continue to take Continue Welchol 625 MG, 3 BID.  She did not submit the stool study as ordered by Dr. Gallagher yesterday.   Last visit (7/22/2020) Patient admits to 5-6 bowel episodes of diarrhea a day.   Of note, patient contact the office on (7/6/2020) stating she completed the Dificid last week and continue to take Welchol 625 MG She denies diarrhea episodes over the past 2 weeks.  Stools are small semi-formed.  Denies straining. She want to discuss fecal transplant via pill form and Interstim.        Last visit (06/24/2020)  Patient presents today for a follow up of diarrhea, review MRI, and QDx results.  Patient was notified on (6/3/2020) QDx stool study was positive for C. Diff, subsequently she was treated with Vancomycin 125 mg po QID for 10 days.  Patient contact the office on (6/15/2020) c/o symptoms are no better. Also continue to experience abdominal pain and fecal incontinence 5+ times a day.    She also want to know if the cholestyramine come in a tablet form, because it say it can damage teeth, which she already has risk of dental damage from her Sjogren's.   Subsequently, patient was treated with Dificid 200 mg po BID for 10 days. Welchol 625 mg III po BID and Imodium - 1-2,  4 times a day prn.  She has not taken Imodium.   She admit bowel movements were getting back to normal last week over the course of 3 days while on a bland diet with having 3 normal formed BM's per day.  While off the diet she would have 3-5 BM's per day with a couple episodes fecal incontinence.  Denies melena, blood or mucus in stools.     Last visit (5/11/2020) Admit 5-6 loose BM's per day with fecal seepage and LLQ abdominal pain. Symptoms were improved then began to increase several weeks ago.  Denies melena, blood or mucus in stools.  Denies pruritus ani, rectal pain or bleeding.    She report maybe 1-2 days a week she will have normal and formed bowel movements.     Last visit (3/31/2020) Admit 5-6 bowel movements per day with 2-3 episodes of diarrhea at least once a day since last office visit.  Denies melena, blood or mucus in stools.  Of note, patient contact the office on (3/13/2020) with c/o 5-6 loose and watery BM's per day and episodes of fecal incontinence since 2 days after her last visit.  Described as full fecal seepage that occur with bending over or walking.    Subsequently a stool study was ordered, treated with Flagyl 500 mg TID, Bactrim DS BID x 10 days, take Imodium prn with caution.  She completed the 10 day course of Flagyl 500 mg TID, Bactrim DS BID on (3/22/2020).   Last visit (3/3/2020)She reports 2-3 bowel movements a day with no strain. She admits her stools are formed with no bleeding, melena, or mucus at this time.   Last visit (1/15/2019) Patient presents today for a follow up of diarrhea. Patient admits rarely experiencing diarrhea since her last office visit. Patient admits 1-3 bowel movements a day. Stools are normal in form.   Patient admits that she is scheduled to see Dr Holden tomorrow.       Patient denies the use of Welchol Tablet, 625 MG, 2 tablets prn with meals, Orally, TID. Patient states that she wanted to clear her body from the medications that she was taking.   Patient denies the use of Dicyclomine HCl Capsule, 10 MG, 1 tablet prn, Orally, TID. Patient states that she felt that this medication wasn't helping her so she stopped taking it.    10/16/18 Patient presents today for a follow up of diarrhea. She states there has been an improvement of symptoms and she admits to having 2-3 bowel movements per day. Ranging from solid to loose stools.   Patient [facility] a pessary, which is a prosthetic device that can be inserted into the vagina to support its internal structure. She was advised this device tends to improve diarrhea and fecal incontinence in patients that have rectocele. Patient also has started Pelvic Floor Therapy and has completed two sessions.   Of note, patient never started Viberzi Tablet, 75 MG.   09/18/2018 Patient presents today for a follow up of diarrhea. She continues to have episodes of watery/loose stools daily. She states she can have up to 5-6 bowel movements per day. She denies blood, mucous or melena.    08/22/2018 Patient presents today to review QDx stool study, lab results and follow up of diarrhea. She has been taking Welchol 625 MG, 2 BID with some improvement in bowel habits.  Currently admit 2-3 defecations per day.  Stools are 50% of the time normal formed and loose/watery.  Denies melena, blood or mucus in stools.  Denies fecal urgency.  Continue to admit episodes of fecal incontinence.      Last visit (7/18/2018) Patient presents today for a follow up after her colonoscopy, EGD and labs.  She denies any complications after her procedure. She continues to have 3-4 bowel movements per day ranging from solid to watery/loose. She denies mucous or melena. She does admit to occasional bright red blood present on toilet paper following a bowel movement. She denies taking  Imodium A-D or Hyoscyamine.  Last visit (6/26/2018) Patient presents today for a consultation of diarrhea since the past 6 months . She states that symptoms started around June 9 th, 2018 after eating dinner. She was evaluated by [facility] on Hannah 10, 2018 for her symptoms. At that time stool studies were completed. She is currently having approximately five watery/loose stools per day. She denies any blood, mucous or melena. However, patient admits the amount of stools may increase if she consumes more food. She has been avoiding eating because that triggers her to have a bowel movement. She has had an eight pound unintentional weight loss since her last visit on 04/24/2018.    She did have an MRI of the abdomen completed on 04/06/2018 which revealed subtle sludge or concentrated bile within the gallbladder.  Per patient her last colonoscopy was 2-3 years ago. She denies having colon polyps at that time.  Of, note patient was prescribed Keflex on May 20, 2018. Admit 5-6 bowel movements per day with 2-3 episodes of diarrhea at least once a day since last office visit.  Denies melena, blood or mucus in stools.  Of note, patient contact the office on (3/13/2020) with c/o 5-6 loose and watery BM's per day and episodes of fecal incontinence since 2 days after her last visit.  Described as full fecal seepage that occur with bending over or walking.    Subsequently a stool study was ordered, treated with Flagyl 500 mg TID, Bactrim DS BID x 10 days, take Imodium prn with caution.  She completed the 10 day course of Flagyl 500 mg TID, Bactrim DS BID on (3/22/2020).   Last visit (3/3/2020)She reports 2-3 bowel movements a day with no strain. She admits her stools are formed with no bleeding, melena, or mucus at this time.   Last visit (1/15/2019) Patient presents today for a follow up of diarrhea. Patient admits rarely experiencing diarrhea since her last office visit. Patient admits 1-3 bowel movements a day. Stools are normal in form.   Patient admits that she is scheduled to see Dr Holden tomorrow.       Patient denies the use of Welchol Tablet, 625 MG, 2 tablets prn with meals, Orally, TID. Patient states that she wanted to clear her body from the medications that she was taking.   Patient denies the use of Dicyclomine HCl Capsule, 10 MG, 1 tablet prn, Orally, TID. Patient states that she felt that this medication wasn't helping her so she stopped taking it.    10/16/18 Patient presents today for a follow up of diarrhea. She states there has been an improvement of symptoms and she admits to having 2-3 bowel movements per day. Ranging from solid to loose stools.   Patient was evaluated [facility] Dr[facility] administered a pessary, which is a prosthetic device that can be inserted into the vagina to support its internal structure. She was advised this device tends to improve diarrhea and fecal incontinence in patients that have rectocele. Patient also has started Pelvic Floor Therapy and has completed two sessions.   Of note, patient never started Viberzi Tablet, 75 MG.   09/18/2018 Patient presents today for a follow up of diarrhea. She continues to have episodes of watery/loose stools daily. She states she can have up to 5-6 bowel movements per day. She denies blood, mucous or melena.    08/22/2018 Patient presents today to review QDx stool study, lab results and follow up of diarrhea. She has been taking Welchol 625 MG, 2 BID with some improvement in bowel habits.  Currently admit 2-3 defecations per day.  Stools are 50% of the time normal formed and loose/watery.  Denies melena, blood or mucus in stools.  Denies fecal urgency.  Continue to admit episodes of fecal incontinence.      Last visit (7/18/2018) Patient presents today for a follow up after her colonoscopy, EGD and labs.  She denies any complications after her procedure. She continues to have 3-4 bowel movements per day ranging from solid to watery/loose. She denies mucous or melena. She does admit to occasional bright red blood present on toilet paper following a bowel movement. She denies taking  Imodium A-D or Hyoscyamine.  Last visit (6/26/2018) Patient presents today for a consultation of diarrhea since the past 6 months . She states that symptoms started around June 9 th, 2018 after eating dinner. She was evaluated by [facility] on [place] 10, 2018 for her symptoms. At that time stool studies were completed. She is currently having approximately five watery/loose stools per day. She denies any blood, mucous or melena. However, patient admits the amount of stools may increase if she consumes more food. She has been avoiding eating because that triggers her to have a bowel movement. She has had an eight pound unintentional weight loss since her last visit on 04/24/2018.    She did have an MRI of the abdomen completed on 04/06/2018 which revealed subtle sludge or concentrated bile within the gallbladder.  Per patient her last colonoscopy was 2-3 years ago. She denies having colon polyps at that time.  Of, note patient was prescribed Keflex on May 20, 2018.;   Hospital Follow Up : Denies any hospitalization since last visit.   Last visit (7/28/2020) [facility] at the advise of our office due to uncontrolled severe abdominal pain.    CT Abd/Pelvis with contrast was performed revealing No findings to account for abdominal pain   Patient was discharged (7/23/2020) after labs, ct reviewed. Dr. Shah, radiology consulted advising mesenteric vessels unremarkable. Based on results, pt will be d/c with outpt follow up to ob/gyn and GI, Dr Ivory.     Last visit (7/22/2020)  Denies any hospitalization since last visit.  Last visit (3/31/2020) Denies any hospitalization since last visit.   Patient presented to [facility] ED June 22, 2019 with abdominal pain.  Onset 5 days prior to ER.  Described as a constant pain located right abdomen and flank area.  Denies any aggravating factors. Denies any hospitalization since.     Patient presented to [facility] ED June 22, 2019 with abdominal pain.  Onset 5 days prior to ER.  Described as a constant pain located right abdomen and flank area.  Denies any aggravating factors.;

## 2020-09-23 ENCOUNTER — TELEPHONE ENCOUNTER (OUTPATIENT)
Dept: URBAN - METROPOLITAN AREA CLINIC 35 | Facility: CLINIC | Age: 60
End: 2020-09-23

## 2020-10-06 ENCOUNTER — OFFICE VISIT (OUTPATIENT)
Dept: URBAN - METROPOLITAN AREA CLINIC 35 | Facility: CLINIC | Age: 60
End: 2020-10-06

## 2020-10-13 ENCOUNTER — TELEPHONE ENCOUNTER (OUTPATIENT)
Dept: URBAN - METROPOLITAN AREA CLINIC 35 | Facility: CLINIC | Age: 60
End: 2020-10-13

## 2020-11-03 ENCOUNTER — OFFICE VISIT (OUTPATIENT)
Dept: URBAN - METROPOLITAN AREA CLINIC 35 | Facility: CLINIC | Age: 60
End: 2020-11-03

## 2020-11-03 VITALS
BODY MASS INDEX: 24.92 KG/M2 | OXYGEN SATURATION: 98 % | HEIGHT: 64 IN | DIASTOLIC BLOOD PRESSURE: 64 MMHG | WEIGHT: 146 LBS | SYSTOLIC BLOOD PRESSURE: 118 MMHG | HEART RATE: 90 BPM | TEMPERATURE: 97.4 F

## 2020-11-03 RX ORDER — HYDROCORTISONE ACETATE 0.5 %
AS DIRECTED CREAM (GRAM) TOPICAL
Status: ACTIVE | COMMUNITY

## 2020-11-03 RX ORDER — MONTELUKAST SODIUM 10 MG/1
1 TABLET IN THE EVENING TABLET, FILM COATED ORAL ONCE A DAY
Status: ACTIVE | COMMUNITY

## 2020-11-03 RX ORDER — RIZATRIPTAN BENZOATE 5 MG/1
1 TABLET ON THE TONGUE AND ALLOW TO DISSOLVE AS NEEDED ONE TIME TABLET, ORALLY DISINTEGRATING ORAL ONCE A DAY
Status: ON HOLD | COMMUNITY

## 2020-11-03 RX ORDER — B-COMPLEX WITH VITAMIN C
1 TABLET TABLET ORAL ONCE A DAY
Status: ACTIVE | COMMUNITY

## 2020-11-03 RX ORDER — HYOSCYAMINE SULFATE 0.125 MG
1 TABLET AS NEEDED TABLET ORAL
Qty: 60 TABLET | Refills: 1 | Status: ON HOLD | COMMUNITY
Start: 2018-06-27

## 2020-11-03 RX ORDER — ELUXADOLINE 75 MG/1
1 TABLET WITH FOOD TABLET, FILM COATED ORAL TWICE A DAY
Status: ON HOLD | COMMUNITY
Start: 2018-09-18

## 2020-11-03 RX ORDER — CELECOXIB 100 MG/1
1 CAPSULE WITH FOOD CAPSULE ORAL TWICE A DAY
Qty: 28 CAPSULE | Refills: 0 | Status: ON HOLD | COMMUNITY
Start: 2020-07-24

## 2020-11-03 RX ORDER — GABAPENTIN 100 MG/1
1 CAPSULE CAPSULE ORAL ONCE A DAY
Status: ON HOLD | COMMUNITY

## 2020-11-03 RX ORDER — FENUGREEK SEED/BL.THISTLE/ANIS 340 MG
AS DIRECTED CAPSULE ORAL
Status: ACTIVE | COMMUNITY

## 2020-11-03 RX ORDER — IBUPROFEN 800 MG/1
TAKE ONE TABLET BY MOUTH THREE TIMES A DAY TABLET, FILM COATED ORAL
Qty: 90 UNSPECIFIED | Refills: 0 | Status: ON HOLD | COMMUNITY

## 2020-11-03 RX ORDER — MESALAMINE 1000 MG/1
1 SUPPOSITORY SUPPOSITORY RECTAL BID
Qty: 60 | Refills: 1 | Status: ON HOLD | COMMUNITY
Start: 2018-07-13

## 2020-11-03 RX ORDER — NITROFURANTOIN MONOHYDRATE/MACROCRYSTALLINE 25; 75 MG/1; MG/1
1 CAPSULE AT BEDTIME WITH FOOD CAPSULE ORAL ONCE A DAY
Qty: 30 | Status: ON HOLD | COMMUNITY

## 2020-11-03 RX ORDER — SPIRONOLACTONE 25 MG/1
1 TABLET TABLET ORAL ONCE A DAY
Status: ACTIVE | COMMUNITY

## 2020-11-03 RX ORDER — PRAVASTATIN SODIUM 20 MG/1
1 TABLET TABLET ORAL ONCE A DAY
Status: ACTIVE | COMMUNITY

## 2020-11-03 RX ORDER — FIDAXOMICIN 200 MG/1
1 TABLET TABLET, FILM COATED ORAL TWICE A DAY
Qty: 20 | Refills: 0 | Status: ON HOLD | COMMUNITY
Start: 2020-06-15

## 2020-11-03 RX ORDER — SULFAMETHOXAZOLE AND TRIMETHOPRIM 800; 160 MG/1; MG/1
1 TABLET TABLET ORAL TWICE A DAY
Qty: 20 | Refills: 0 | Status: ON HOLD | COMMUNITY
Start: 2020-03-13

## 2020-11-03 RX ORDER — FIDAXOMICIN 200 MG/1
1 TABLET TABLET, FILM COATED ORAL TWICE A DAY
Qty: 10 TABLET | Status: ON HOLD | COMMUNITY
Start: 2020-06-24

## 2020-11-03 RX ORDER — VANCOMYCIN HYDROCHLORIDE 125 MG/1
1 CAPSULE CAPSULE ORAL
Qty: 40 | Refills: 0 | Status: ON HOLD | COMMUNITY
Start: 2020-06-03

## 2020-11-03 RX ORDER — HYDROXYCHLOROQUINE SULFATE 200 MG/1
1 TABLET WITH FOOD OR MILK TABLET ORAL BID
Status: ACTIVE | COMMUNITY

## 2020-11-03 RX ORDER — METRONIDAZOLE 500 MG/1
1 TABLET TABLET, FILM COATED ORAL THREE TIMES A DAY
Qty: 30 | Refills: 0 | Status: ON HOLD | COMMUNITY
Start: 2020-03-12

## 2020-11-03 RX ORDER — HYDROXYCHLOROQUINE SULFATE 200 MG/1
1 TABLET WITH FOOD OR MILK TABLET ORAL TWICE A DAY
Status: ON HOLD | COMMUNITY

## 2020-11-03 RX ORDER — CHOLECALCIFEROL TAB 50 MCG (2000 UNIT) 50 MCG
1 TABLET TAB ORAL ONCE A DAY
Status: ACTIVE | COMMUNITY

## 2020-11-03 RX ORDER — COLESEVELAM HYDROCHLORIDE 625 MG/1
3 TABLETS WITH MEALS TABLET, FILM COATED ORAL TWICE A DAY
Status: ON HOLD | COMMUNITY
Start: 2020-06-15

## 2020-11-03 RX ORDER — LEVOMEFOLATE/ALGAL OIL 15-90.314
1 CAPSULE CAPSULE ORAL ONCE A DAY
Status: ACTIVE | COMMUNITY

## 2020-11-03 RX ORDER — MULTIVIT-MIN/IRON/FOLIC ACID/K 18-600-40
AS DIRECTED CAPSULE ORAL
Status: ACTIVE | COMMUNITY

## 2020-11-03 RX ORDER — CHOLESTYRAMINE 4 G/9G
1 PACKET MIXED WITH WATER OR NON-CARBONATED DRINK POWDER, FOR SUSPENSION ORAL TWICE A DAY
Qty: 60 | Refills: 1 | Status: ON HOLD | COMMUNITY
Start: 2020-06-11

## 2020-11-03 RX ORDER — NEBIVOLOL HYDROCHLORIDE 5 MG/1
1 TABLET TABLET ORAL ONCE A DAY
Status: ACTIVE | COMMUNITY

## 2020-11-03 RX ORDER — ZOLPIDEM TARTRATE 5 MG/1
(SCHEDULE IV DRUG) TAKE ONE TABLET BY MOUTH AT BEDTIME TABLET, COATED ORAL
Qty: 30 UNSPECIFIED | Refills: 0 | Status: ACTIVE | COMMUNITY

## 2020-11-03 RX ORDER — SACCHAROMYCES BOULARDII 50 MG
1 CAPSULE CAPSULE ORAL TWICE A DAY
Qty: 60 | Status: ACTIVE | COMMUNITY

## 2020-11-03 RX ORDER — ASCORBIC ACID 125 MG
AS DIRECTED TABLET,CHEWABLE ORAL
Status: ACTIVE | COMMUNITY

## 2020-11-03 RX ORDER — MAGNESIUM OXIDE/MAG AA CHELATE 300 MG
1 CAPSULE WITH A MEAL CAPSULE ORAL ONCE A DAY
Status: ACTIVE | COMMUNITY

## 2020-11-03 RX ORDER — ALPRAZOLAM 2 MG/1
1 TABLET TABLET, EXTENDED RELEASE ORAL TWICE A DAY
Status: ACTIVE | COMMUNITY

## 2020-11-03 RX ORDER — CIPROFLOXACIN HCL 500 MG
1 TABLET TABLET ORAL
Qty: 20 | Refills: 0 | Status: ON HOLD | COMMUNITY
Start: 2020-03-12

## 2020-11-03 RX ORDER — TRAMADOL HYDROCHLORIDE 50 MG/1
TABLET, FILM COATED ORAL TWICE DAILY
Status: ACTIVE | COMMUNITY

## 2020-11-03 RX ORDER — BUPROPION HYDROCHLORIDE 300 MG/1
1 TABLET IN THE MORNING TABLET, EXTENDED RELEASE ORAL ONCE A DAY
Status: ACTIVE | COMMUNITY

## 2020-11-03 RX ORDER — ESOMEPRAZOLE MAGNESIUM 40 MG/1
1 CAPSULE CAPSULE, DELAYED RELEASE ORAL ONCE A DAY
Status: ON HOLD | COMMUNITY

## 2020-11-03 RX ORDER — VORTIOXETINE 20 MG/1
1 TABLET TABLET, FILM COATED ORAL ONCE A DAY
Status: ACTIVE | COMMUNITY

## 2020-11-03 NOTE — HPI-MIGRATED HPI
;   ;   ;   ;   ;   ;   ;   ;     Abdominal Pain : Patient admits that her abdominal pain has resolved.    Last visit (9/15/2020) Abdominal pain episode after consuming pizza and banana pepper. Symptoms last for a few minutes then dissipate on it's own.   Last visit (7/28/2020) Patient presents today via televisit with consent for a follow up of abdominal pain.  Pain is located just below and periumbilical.  Described as a dull ache that occur daily in an intermittent pattern.  She will wake up with diffuse lower back pain and abdominal pain. Back pain gradually dissipate over the course of an hour,  but the abdominal pain continue to persist.  She report no clear aggravating factor, but occasionally will get relief with laying on her right side.   She is taking Dicyclomine 10 mg 1 TID without significant benefit.  She did not try the Celebrex after reading the warning insert.   She has an appt. with her Urogynecologist on Friday.   Of note, patient presented to [facility] on (7/23/2020) at the advise of our office due to uncontrolled severe abdominal pain.   Last visit (7/22/2020) Of note, patient contact the office on (7/6/2020) stating she completed the Dificid last week and continue to take Welchol 625 MG and hyoscyamine, but continue to have lower abdominal pain.  Pain is constant and described as a cramping pain. She report the hyoscyamine is not as effective.    Symptoms start upon waking up in the mornings and persist throughout the day. Patient state "something's got to give".  Subsequently she was advised to  Stop Hyoscyamine and start Dicyclomine 10 mg po QID prn   Patient reports Dicyclomine 10 mg po QID prn is not effective.    Last visit (06/24/2020)  Patient contact the office on (5/15/2020) c/o continue to experience abdominal pain and fecal incontinence 5+ times a day.   She admit lower abdominal pain below the umbilicus. Symptoms are present most morning upon waking up and will persist throughout the day.  She has been taking Hyoscyamine 2-3 times a day with temporary relief.       Last visit (5/15/2020) Patient presents today via televisit with consent for a follow up of increase LLQ abdominal pain.   Pain has been present constant and described as a dull ache. Denies any aggravating factors.   Admit some relief with the use of Hyoscyamine 1 BID.  She contact the office on (3/8/2020) c/o 5-6 loose BM's per day with fecal seepage and LLQ abdominal pain. Symptoms were improved then began to increase several weeks ago.  Denies melena, blood or mucus in stools. Denies pruritus ani, rectal pain or bleeding.   Abdominal pain is described as a dull ache that occur randomly throughout the day and will last for minutes to hours.  She ran out of the Hyoscyamine weeks ago.  Denies fever or chills.  Subsequently patient was informed to go to ER if LLQ pain is persistent and worse.      Last visit (3/31/2020) Patient presents today via tele visit to review CT, labs, stool study and follow up of LLQ abdominal pain associated with diverticulitis.  She admit improvement of symptoms with the use of Hyoscyamine and Ibuprofen 800 mg prn .  Last episode occurred yesterday.   Described as a sharp pain, that is typically present each day upon waking in the mornings.  She completed treatment for diverticulitis, Bactrim DS and Flagyl on (3/22/2020)   Of note, patient contact the office on (3/13/2020) with c/o 5-6 loose and watery BM's per day and episodes of fecal incontinence since 2 days after her last visit.  Described as full fecal seepage that occur with bending over or walking.   She state she has been treated with 3 antibiotics since Jan. 2020- Cefdnir x 10 days in Jan. 2020 for URI, then Cipro 500 mg 1 BID x 7 days on (2/3/2020), which she took for 3 days then told to d/c and start Moxifloxacin 100 mg x 10 days on (2/11/2020) due to the Diverticulitis.   Subsequently a stool study was ordered, treated with Flagyl 500 mg TID, Bactrim DS BID x 10 days, take Imodium prn with caution.  On (3/19/2020) patient continue to c/o LLQ abdominal pain upon waking up in the mornings. Pain is sharp in nature.  She will take Hyoscyamine, but it will take 1-2 hours to get relief.   Last visit (3/3/2020) Patient presents today for a follow up of abdominal pain. She denies any episodes of abdominal pain since her last office visit.   Patient had a CT Chest/ABD/Pelvis completed on 2/5/2020 showing mild diverticulitis of the proximal descending colon, with mild moderate inflammatory changes, including fat stranding and trace fluid. Moderate diffuse diverticulosis of the colon, more extensively involving the left colon. Groundglass infiltrate in the posterior right upper lobe, suspicious for possible pneumonia. Mild hepatic steatosis.   Patient admits that she was on abx and steroids due to a respiratory infection in December   Last visit (1/15/2019) Patient admits experiencing abdominal pain rarely since her last office visit.    10/16/18 Patient marks improvement of abdominal pain. She states that she has had 4-5 episodes since her last office visit 09/18/2018.  09/18/2018 Patient continues to have abdominal pain daily. She describes episodes as a cramping sensation throughout her lower abdomen. She typically begins to have symptoms after consuming a meal.  She is unsure if symptoms are improved with Dicyclomine 10 mg BID.   Of note, Patient called the office on 09/05/2018 complaining of abdominal cramping and  fecal incontinence. As a result we increased her Welchol 625mg from 2 tabs po bid to 2 tabs po tid.   08/22/2018 She admit improvement of symptoms with the use of Dicyclomine 10 mg BID.    Of note, patient contact the office (8/1/2018) stating the Hyoscyamine wasn't as effective, subsequently she was changed to Dicyclomine 10 mg po TID prn.  Last visit (7/18/2018) Patient continues to have abdominal discomfort. She admits the pain is infrequent and ranges from the left to right side of her abdomen. She continues to deny any aggravating factors.    Last visit (6/26/2018) Patient continues to have right sided abdominal pain accompanied with diarrhea daily. She describes episodes as a severe cramping sensation. She denies any aggravating factors.    04/24/2018 Continues to have right sided abdominal pain , intermittent severity , constant . No clear aggravating or alleviating factors     At last visit (10/31/2017) Admits abdominal pain that is located mid region and occur in an intermittent pattern.  Onset Dec. 2016 right after her hemorrhoidectomy and never got better.  Described as a dull ache that last for minutes to hours at a time.   Symptoms are mostly noticed upon waking up.  Admit relief of symptoms after defecation.     Patient denies any episodes of RUQ abdominal pain this year.  Symptoms were described as a dull aching to crampy and sharp pain.  Pain was initially intermittently, then  began to  worsen first of May. Patient notes as symptoms progressively worsen she went to [facility] ER 5/3/2016 and at that time her pain was severe, level 8 /10.  The Location of pain at the time she was seen in ER was RUQ abdomen and radiated to right side of her back, and middle of the back. Pain was worse with inhalation.  She had CT Abdomen, Chest Xray and was treated with IV fluids, Morphine with great relief of symptoms. Patient was discharged with [place] 5/325 #12 and to follow up with GI, and PCP.  She denies any abdominal pain since the day after she discharged from [facility] ER.  Patient denies the use of antibiotics within the last (6) months. 	    Outside Labs:  (5/3/2016) Glucose 105 (H), Creatinine 1.16 (H), Protein Total 8.5 (H), ALT 36 (N), AST 20 (N), Albumin 4.5 (N), Total Bilirubin 0.6 (N), Total Calcium 10.6 (H), Alkaline Phosphatase 65 (N), Lipase 391 (N), WBC 7.2 (N), RBC 4.98 (N), Hemoglobin 15.4 (N), Hematocrit 44.4 (H), Platelet 345 (N).    Outside Imaging: (5/3/2016) CT Pelvis with impression: No acute intra-abdominal process. Colonic diverticulosis without diverticulitis. Normal appendix. No bowel obstruction. Persistent prominent appearance of the pancreatic duct measuring up to 3.9 mm in maximal diameter. Fatty liver with hepatomegaly.  (05/15/2015) CT Abdomen and Pelvis with contrast impression: Normal appendix. Descending and sigmoid colon diverticula. No diverticulitis. Borderline pancreatic ductal dilation. No biliary ductal dilation. Further evaluation with MRI of the abdomen and MRCP may be obtained.;   Fecal incontinence : She denies any episodes of fecal incontinence since her last visit.    Last visit (9/15/2020) She report having normal bowel habits over the past month without episodes of fecal incontinence or associated fecal urgency.   Last visit (7/28/2020) Admit random episodes of fecal incontinence with associated fecal urgency.   Last visit (7/22/2020) Continue to experience Fecal incontinence episodes daily.   Symptoms are associated with diarrhea episodes, at least 5-6 times a day.  Last visit (06/24/2020) Continue to experience Fecal incontinence episodes daily associated with diarrhea episodes, 5+ times a day..  She has been wearing under pads due to symptoms.   Last visit (5/11/2020) Admit having fecal incontinence while sitting last week. She has a Cystocele/Rectocele since 2016 and report having Vaginal Pessary Inserted in (2019) performed by Dr. Edie Holden.   Last visit (3/31/2020) Continue to experience Fecal incontinence episodes daily associated with diarrhea episodes.  She has been wearing under pads due to symptoms.    Of note, patient contact the office on (3/13/2020) with c/o 5-6 loose and watery BM's per day and episodes of fecal incontinence since 2 days after her last visit.  Described as full fecal seepage that occur with bending over or walking.      Last visit (3/3/2020) Patient denies episodes of Fecal incontinence since last office visit.  Last visit (1/15/2019) Patient denies experiencing any fecal incontinence episodes since her last office visit.    10/16/18 Patient states that she has had improvement of symptoms. She has had less episodes since having a pessary device inserted.  09/18/2018 Patient continues to have episodes of fecal incontinence daily.   08/22/2018 She continue to experience fecal incontinence. Symptoms occur 1-2 times per day.  She continue to ware a pad in her under garment, because she does not feel the leakage.    Last visit (7/18/2018)Patient continues to have fecal incontinence.   Last visit (6/26/2018) Patient continues to have fecal incontinence. She states that she has seen Dr. Butt since her last visit. Of note, this was prior to her having diarrhea. At the time of her consultation he diagnosed her with cystocele and rectocele. In addition, they discussed her having a device placed in her back to help control her sphincter.   04/24/2018 Continues to have fecal incontinence and will be following up with Dr. ATUL Butt Continue to experience Fecal incontinence episodes daily associated with diarrhea episodes.  she has been wearing under pads due to symptoms.    Of note, patient contact the office on (3/13/2020) with c/o 5-6 loose and watery BM's per day and episodes of fecal incontinence since 2 days after her last visit.  Described as full fecal seepage that occur with bending over or walking.      Last visit (3/3/2020) Patient denies episodes of Fecal incontinence since last office visit.  Last visit (1/15/2019) Patient denies experiencing any fecal incontinence episodes since her last office visit.    10/16/18 Patient states that she has had improvement of symptoms. She has had less episodes since having a pessary device inserted.  09/18/2018 Patient continues to have episodes of fecal incontinence daily.   08/22/2018 She continue to experience fecal incontinence. Symptoms occur 1-2 times per day.  She continue to ware a pad in her under garment, because she does not feel the leakage.    Last visit (7/18/2018)Patient continues to have fecal incontinence.   Last visit (6/26/2018) Patient continues to have fecal incontinence. She states that she has seen Dr. Butt since her last visit. Of note, this was prior to her having diarrhea. At the time of her consultation he diagnosed her with cystocele and rectocele. In addition, they discussed her having a device placed in her back to help control her sphincter.   04/24/2018 Continues to have fecal incontinence and will be following up with Dr. ATUL Butt ;   Abnormal MRI : Last visit (1/15/2019) Patient admits that she has had a heart test completed as well as a MRI of her neck completed in December and she has not yet received the results.  10/16/2018 Denie any recent imaging completed.  09/18/2018 No recent imaging completed.  04/24/2018 Patient presents today for a follow up of her pancreas  divisum which was diagnosed via MRI Abdomen May 29, 2015.  She notes of having low back pain in May 2015 and was seen by her PCP who subsequently ordered an MRI Abdomen w/wo contrast.  (5/29/2015) MR Abdomen w/wo contrast impression: Pancreas divisum with separate drainage of the pancreatic duct and common bile duct ar the duodenum. Mild, diffuse pancreatic ductal dilation without obstructing mass or calculus.    Last visit (1/15/2019) Patient admits that she has had a heart test completed as well as a MRI of her neck completed in December and she has not yet received the results.  10/16/18 No recent imaging completed.  09/18/2018 No recent imaging completed.  04/24/2018 Patient presents today for a follow up of her pancreas  divisum which was diagnosed via MRI Abdomen May 29, 2015.  She notes of having low back pain in May 2015 and was seen by her PCP who subsequently ordered an MRI Abdomen w/wo contrast.  (5/29/2015) MR Abdomen w/wo contrast impression: Pancreas divisum with separate drainage of the pancreatic duct and common bile duct ar the duodenum. Mild, diffuse pancreatic ductal dilation without obstructing mass or calculus. ;   Dysphagia : She denies any episodes dysphagia since her last visit. She does admits that she feels gas at this time and reports that she flatulates often and is unable to control symptoms.    Last visit (9/15/2020) Denies globus or dysphagia.   Last visit (7/28/2020) She has a globus sensation that occur daily.  Denies dysphagia.    Last visit (7/22/2020)  She has a globus sensation since the past three days with regurgitation .   Patient can not recall starting. in her words ( It doesn't matter now ) Famotidine 20 mg po QHS 30-1    She continue to deny episodes of dysphagia Last visit (5/11/2020) She continue to deny episodes of dysphagia  Last visit (3/31/2020) She continue to deny episodes of dyspragia.  Last visit (3/3/2020) Patient denies episodes of dyspragia since last office visit.  Last visit (1/15/2019) Patient denies any episodes of dysphagia since her EGD with dilation was completed.   10/16/18 Patient continues to deny episodes of dysphagia.   09/18/2018 Patient continues to deny episodes of dysphagia.    08/22/2018 Continue to deny dysphagia since EGD w/dilation.    Last visit (7/18/2018) Patient denies recent episodes of dysphagia.  Last visit (6/26/2018) Patient denies recent episodes of dysphagia.  04/24/2018 Continues to have dysphagia with solid food , upper esophagus , few times a week    At last visit (10/31/2017)Difficulty in swallowing since the past year, few times a month associated with dryness of mouth due to Sjogren's She continue to deny episodes of dyspragia.  Last visit (3/3/2020) Patient denies episodes of dyspragia since last office visit.  Last visit (1/15/2019) Patient denies any episodes of dysphagia since her EGD with dilation was completed.   10/16/18 Patient continues to deny episodes of dysphagia.   09/18/2018 Patient continues to deny episodes of dysphagia.    08/22/2018 Continue to deny dysphagia since EGD w/dilation.    Last visit (7/18/2018) Patient denies recent episodes of dysphagia.  Last visit (6/26/2018) Patient denies recent episodes of dysphagia.  04/24/2018 Continues to have dysphagia with solid food , upper esophagus , few times a week    At last visit (10/31/2017)Difficulty in swallowing since the past year, few times a month associated with dryness of mouth due to Sjogren's    ;   Fatty Liver :  Patient currently denies  jaundice, chills, RUQ pains, dizziness, or easy bruising.   Last visit (9/15/2020) She denies jaundice, chills, RUQ pains, dizziness, or light headedness.  Admit increased fatigue over the past month and was experiencing feeling "lethargic".  She saw her Internist last week and report having an extensive amount of blood work completed, which she is awaiting results.    Last visit (7/22/2020) She continue to deny  jaundice, chills, RUQ pains, dizziness, or light headedness   Last visit (3/31/2020) She continue to deny  jaundice, chills, RUQ pains, dizziness, or light headedness. Admit still feeling fatigue she is unsure if this has to do with her Liver or her Sjogren's Disease.   Last visit (3/3/2020) Patient denies jaundice, chills, RUQ pains, dizziness, or light headedness. Admits still feeling fatigue she is unsure if this has to do with her Liver or her Sjogren's Disease.  Last visit (01/15/2019) Patient currently denies jaundice, chills, RUQ pains, dizziness, or light headedness. Admits still feeling fatigue.    10/16/18 Patient was diagnosed with fatty liver in 2017.  She currently denies jaundice, chills, RUQ pains, dizziness, or light headedness Admit easy bruising over past few months, which is being follow by Hematologist Dr. Hakeem Fallon.  Admit several year history of feeling fatigue.    09/18/2018 Patient was diagnosed with fatty liver in 2017.  She currently denies jaundice, chills, RUQ pains, dizziness, or light headedness Admit easy bruising over past few months, which is being follow by Hematologist Dr. Hakeem Fallon.  Admit several year history of feeling fatigue.   08/22/2018 Patient was diagnosed with a fatty liver in 2017. Most recent labs with PCP Dr. Michelle Naidu a month ago due to skin rash from an allergic reaction from an unknown source. Labs completed (7/12/2018) revealed CBC and CMP within normal limits.   She currently denies jaundice, chills, RUQ pains, dizziness, or light headedness Admit easy bruising over past few months, which is being follow by Hematologist Dr. Hakeem Fallon.  Admit several year history of feeling fatigue.    04/24/2018 Patient was found to have fatty liver via CT abdomen on May 3, 2017.  Most recent labs with PCP Dr. Michelle Naidu  Aug. 23, 2017 at which time patient states her liver enzymes were fine, just had elevated calcium levels.  Patient currently denies jaundice, chills, RUQ pains, dizziness, or light headedness Admit easy bruising over past few months, which is being follow by Hematologist Dr. Hakeem Fallon.  Admit several year history of feeling fatigue.   Outside Labs:  (5/3/2016) Glucose 105 (H), Creatinine 1.16 (H), Protein Total 8.5 (H), ALT 36 (N), AST 20 (WNL), Albumin 4.5 (N), Total Bilirubin 0.6 (N), Total Calcium 10.6 (H), Alkaline Phosphatase 65 (N), Lipase 391 (N), WBC 7.2 (N), RBC 4.98 (N), Hemoglobin 15.4 (N), Hematocrit 44.4 (H), Platelet 345 (N).  (10/31/2017)NAFLD Score: -0.979   &lt; -1.455: predictor of absence of significant fibrosis (F0-F2 fibrosis) ? -1.455 to ? 0.675: indeterminate score > 0.675: predictor of presence of significant fibrosis (F3-F4 fibrosis)     Outside Imaging: (5/3/2016) CT Pelvis with impression: No acute intra-abdominal process. Colonic diverticulosis without diverticulitis. Normal appendix. No bowel obstruction. Persistent prominent appearance of the pancreatic duct measuring up to 3.9 mm in maximal diameter. Fatty liver with hepatomegaly.  (05/15/2015) CT Abdomen and Pelvis with contrast impression: Normal appendix. Descending and sigmoid colon diverticula. No diverticulitis. Borderline pancreatic ductal dilation. No biliary ductal dilation. Further evaluation with MRI of the abdomen and MRCP may be obtained. She continue to deny  jaundice, chills, RUQ pains, dizziness, or light headedness. Admit still feeling fatigue she is unsure if this has to do with her Liver or her Sjrogen's Disease.   Last visit (3/3/2020) Patient denies jaundice, chills, RUQ pains, dizziness, or light headedness. Admits still feeling fatigue she is unsure if this has to do with her Liver or her Sjrogen's Disease.  Last visit (01/15/2019) Patient currently denies jaundice, chills, RUQ pains, dizziness, or light headedness. Admits still feeling fatigue.    10/16/18 Patient was diagnosed with fatty liver in 2017.  She currently denies jaundice, chills, RUQ pains, dizziness, or light headedness Admit easy bruising over past few months, which is being follow by Hematologist Dr. Hakeem Fallon.  Admit several year history of feeling fatigue.    09/18/2018 Patient was diagnosed with fatty liver in 2017.  She currently denies jaundice, chills, RUQ pains, dizziness, or light headedness Admit easy bruising over past few months, which is being follow by Hematologist Dr. Hakeem Fallon.  Admit several year history of feeling fatigue.   08/22/2018 Patient was diagnosed with a fatty liver in 2017. Most recent labs with PCP Dr. Michelle Naidu a month ago due to skin rash from an allergic reaction from an unknown source. Labs completed (7/12/2018) revealed CBC and CMP within normal limits.   She currently denies jaundice, chills, RUQ pains, dizziness, or light headedness Admit easy bruising over past few months, which is being follow by Hematologist Dr. Hakeem Fallon.  Admit several year history of feeling fatigue.    04/24/2018 Patient was found to have fatty liver via CT abdomen on May 3, 2017.  Most recent labs with PCP Dr. Michelle Naidu  Aug. 23, 2017 at which time patient states her liver enzymes were fine, just had elevated calcium levels.  Patient currently denies jaundice, chills, RUQ pains, dizziness, or light headedness Admit easy bruising over past few months, which is being follow by Hematologist Dr. Hakeem Fallon.  Admit several year history of feeling fatigue.   Outside Labs:  (5/3/2016) Glucose 105 (H), Creatinine 1.16 (H), Protein Total 8.5 (H), ALT 36 (N), AST 20 (WNL), Albumin 4.5 (N), Total Bilirubin 0.6 (N), Total Calcium 10.6 (H), Alkaline Phosphatase 65 (N), Lipase 391 (N), WBC 7.2 (N), RBC 4.98 (N), Hemoglobin 15.4 (N), Hematocrit 44.4 (H), Platelet 345 (N).  (10/31/2017)NAFLD Score: -0.979   &lt; -1.455: predictor of absence of significant fibrosis (F0-F2 fibrosis) ? -1.455 to ? 0.675: indeterminate score > 0.675: predictor of presence of significant fibrosis (F3-F4 fibrosis)     Outside Imaging: (5/3/2016) CT Pelvis with impression: No acute intra-abdominal process. Colonic diverticulosis without diverticulitis. Normal appendix. No bowel obstruction. Persistent prominent appearance of the pancreatic duct measuring up to 3.9 mm in maximal diameter. Fatty liver with hepatomegaly.  (05/15/2015) CT Abdomen and Pelvis with contrast impression: Normal appendix. Descending and sigmoid colon diverticula. No diverticulitis. Borderline pancreatic ductal dilation. No biliary ductal dilation. Further evaluation with MRI of the abdomen and MRCP may be obtained. ;   Diarrhea : She denies any episodes of diarrhea. Patient currently admits 1-2 bowel movements a day with no strain. Her stools are formed without blood, mucus, melena, pruritus ani, or rectal pain.    Last visit (9/15/2020) Patient presents today for a follow up of diarrhea.  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  Treatment consisted of Dificid and Zinplava ( Infusion ), which she completed on (8/18/2020) with marked improvement of symptoms.  She report having normal bowel habits over the past month.  Currently admit 2-4 normal and formed bowel movements per day. Denies melena, blood or mucus in stools.     Patient state she had a follow up with Uro-Gynecologist Dr. Holden suggest she had a 2nd opinion with another GI.  Also she did a rectal exam and was told her sphincter muscle was a 5/10.  She is currently taking Dicyclomine HCl 10 MG and Welchol 3 BID.  She report having yellowish orange stool when she have fecal incontinence, which occur randomly.  Last episodes occured 5 days ago. Denies melena, blood or mucus in stools.      Last visit (7/28/2020) Currently admit 3 normal and formed evacuations and 1 episodes of watery bowel movements.  Admit associated episodes of fecal incontinence.  She continue to take Continue Welchol 625 MG, 3 BID.  She did not submit the stool study as ordered by Dr. Gallagher yesterday.   Last visit (7/22/2020) Patient admits to 5-6 bowel episodes of diarrhea a day.   Of note, patient contact the office on (7/6/2020) stating she completed the Dificid last week and continue to take Welchol 625 MG She denies diarrhea episodes over the past 2 weeks.  Stools are small semi-formed.  Denies straining. She want to discuss fecal transplant via pill form and Interstim.        Last visit (06/24/2020)  Patient presents today for a follow up of diarrhea, review MRI, and QDx results.  Patient was notified on (6/3/2020) QDx stool study was positive for C. Diff, subsequently she was treated with Vancomycin 125 mg po QID for 10 days.  Patient contact the office on (6/15/2020) c/o symptoms are no better. Also continue to experience abdominal pain and fecal incontinence 5+ times a day.    She also want to know if the cholestyramine come in a tablet form, because it say it can damage teeth, which she already has risk of dental damage from her Sjogren's.   Subsequently, patient was treated with Dificid 200 mg po BID for 10 days. Welchol 625 mg III po BID and Imodium - 1-2,  4 times a day prn.  She has not taken Imodium.   She admit bowel movements were getting back to normal last week over the course of 3 days while on a bland diet with having 3 normal formed BM's per day.  While off the diet she would have 3-5 BM's per day with a couple episodes fecal incontinence.  Denies melena, blood or mucus in stools.     Last visit (5/11/2020) Admit 5-6 loose BM's per day with fecal seepage and LLQ abdominal pain. Symptoms were improved then began to increase several weeks ago.  Denies melena, blood or mucus in stools.  Denies pruritus ani, rectal pain or bleeding.    She report maybe 1-2 days a week she will have normal and formed bowel movements.     Last visit (3/31/2020) Admit 5-6 bowel movements per day with 2-3 episodes of diarrhea at least once a day since last office visit.  Denies melena, blood or mucus in stools.  Of note, patient contact the office on (3/13/2020) with c/o 5-6 loose and watery BM's per day and episodes of fecal incontinence since 2 days after her last visit.  Described as full fecal seepage that occur with bending over or walking.    Subsequently a stool study was ordered, treated with Flagyl 500 mg TID, Bactrim DS BID x 10 days, take Imodium prn with caution.  She completed the 10 day course of Flagyl 500 mg TID, Bactrim DS BID on (3/22/2020).   Last visit (3/3/2020)She reports 2-3 bowel movements a day with no strain. She admits her stools are formed with no bleeding, melena, or mucus at this time.   Last visit (1/15/2019) Patient presents today for a follow up of diarrhea. Patient admits rarely experiencing diarrhea since her last office visit. Patient admits 1-3 bowel movements a day. Stools are normal in form.   Patient admits that she is scheduled to see Dr Holden tomorrow.       Patient denies the use of Welchol Tablet, 625 MG, 2 tablets prn with meals, Orally, TID. Patient states that she wanted to clear her body from the medications that she was taking.   Patient denies the use of Dicyclomine HCl Capsule, 10 MG, 1 tablet prn, Orally, TID. Patient states that she felt that this medication wasn't helping her so she stopped taking it.    10/16/18 Patient presents today for a follow up of diarrhea. She states there has been an improvement of symptoms and she admits to having 2-3 bowel movements per day. Ranging from solid to loose stools.   Patient was evaluated by Dr. Holden a Urogynecologist at [facility] who administered a pessary, which is a prosthetic device that can be inserted into the vagina to support its internal structure. She was advised this device tends to improve diarrhea and fecal incontinence in patients that have rectocele. Patient also has started Pelvic Floor Therapy and has completed two sessions.   Of note, patient never started Viberzi Tablet, 75 MG.   09/18/2018 Patient presents today for a follow up of diarrhea. She continues to have episodes of watery/loose stools daily. She states she can have up to 5-6 bowel movements per day. She denies blood, mucous or melena.    08/22/2018 Patient presents today to review QDx stool study, lab results and follow up of diarrhea. She has been taking Welchol 625 MG, 2 BID with some improvement in bowel habits.  Currently admit 2-3 defecations per day.  Stools are 50% of the time normal formed and loose/watery.  Denies melena, blood or mucus in stools.  Denies fecal urgency.  Continue to admit episodes of fecal incontinence.      Last visit (7/18/2018) Patient presents today for a follow up after her colonoscopy, EGD and labs.  She denies any complications after her procedure. She continues to have 3-4 bowel movements per day ranging from solid to watery/loose. She denies mucous or melena. She does admit to occasional bright red blood present on toilet paper following a bowel movement. She denies taking  Imodium A-D or Hyoscyamine.  Last visit (6/26/2018) Patient presents today for a consultation of diarrhea since the past 6 months . She states that symptoms started around June 9 th, 2018 after eating dinner. She was evaluated by [facility] on Hannah 10, 2018 for her symptoms. At that time stool studies were completed. She is currently having approximately five watery/loose stools per day. She denies any blood, mucous or melena. However, patient admits the amount of stools may increase if she consumes more food. She has been avoiding eating because that triggers her to have a bowel movement. She has had an eight pound unintentional weight loss since her last visit on 04/24/2018.    She did have an MRI of the abdomen completed on 04/06/2018 which revealed subtle sludge or concentrated bile within the gallbladder.  Per patient her last colonoscopy was 2-3 years ago. She denies having colon polyps at that time.  Of, note patient was prescribed Keflex on May 20, 2018. Admit 5-6 bowel movements per day with 2-3 episodes of diarrhea at least once a day since last office visit.  Denies melena, blood or mucus in stools.  Of note, patient contact the office on (3/13/2020) with c/o 5-6 loose and watery BM's per day and episodes of fecal incontinence since 2 days after her last visit.  Described as full fecal seepage that occur with bending over or walking.    Subsequently a stool study was ordered, treated with Flagyl 500 mg TID, Bactrim DS BID x 10 days, take Imodium prn with caution.  She completed the 10 day course of Flagyl 500 mg TID, Bactrim DS BID on (3/22/2020).   Last visit (3/3/2020)She reports 2-3 bowel movements a day with no strain. She admits her stools are formed with no bleeding, melena, or mucus at this time.   Last visit (1/15/2019) Patient presents today for a follow up of diarrhea. Patient admits rarely experiencing diarrhea since her last office visit. Patient admits 1-3 bowel movements a day. Stools are normal in form.   Patient admits that she is scheduled to see Dr Holden tomorrow.       Patient denies the use of Welchol Tablet, 625 MG, 2 tablets prn with meals, Orally, TID. Patient states that she wanted to clear her body from the medications that she was taking.   Patient denies the use of Dicyclomine HCl Capsule, 10 MG, 1 tablet prn, Orally, TID. Patient states that she felt that this medication wasn't helping her so she stopped taking it.    10/16/18 Patient presents today for a follow up of diarrhea. She states there has been an improvement of symptoms and she admits to having 2-3 bowel movements per day. Ranging from solid to loose stools.   Patient was evaluated by Dr. Holden a Urogynecologist at [facility] who administered a pessary, which is a prosthetic device that can be inserted into the vagina to support its internal structure. She was advised this device tends to improve diarrhea and fecal incontinence in patients that have rectocele. Patient also has started Pelvic Floor Therapy and has completed two sessions.   Of note, patient never started Viberzi Tablet, 75 MG.   09/18/2018 Patient presents today for a follow up of diarrhea. She continues to have episodes of watery/loose stools daily. She states she can have up to 5-6 bowel movements per day. She denies blood, mucous or melena.    08/22/2018 Patient presents today to review QDx stool study, lab results and follow up of diarrhea. She has been taking Welchol 625 MG, 2 BID with some improvement in bowel habits.  Currently admit 2-3 defecations per day.  Stools are 50% of the time normal formed and loose/watery.  Denies melena, blood or mucus in stools.  Denies fecal urgency.  Continue to admit episodes of fecal incontinence.      Last visit (7/18/2018) Patient presents today for a follow up after her colonoscopy, EGD and labs.  She denies any complications after her procedure. She continues to have 3-4 bowel movements per day ranging from solid to watery/loose. She denies mucous or melena. She does admit to occasional bright red blood present on toilet paper following a bowel movement. She denies taking  Imodium A-D or Hyoscyamine.  Last visit (6/26/2018) Patient presents today for a consultation of diarrhea since the past 6 months . She states that symptoms started around June 9 th, 2018 after eating dinner. She was evaluated by [facility] on Hannah 10, 2018 for her symptoms. At that time stool studies were completed. She is currently having approximately five watery/loose stools per day. She denies any blood, mucous or melena. However, patient admits the amount of stools may increase if she consumes more food. She has been avoiding eating because that triggers her to have a bowel movement. She has had an eight pound unintentional weight loss since her last visit on 04/24/2018.    She did have an MRI of the abdomen completed on 04/06/2018 which revealed subtle sludge or concentrated bile within the gallbladder.  Per patient her last colonoscopy was 2-3 years ago. She denies having colon polyps at that time.  Of, note patient was prescribed Keflex on May 20, 2018.;   Elevated Liver Enzymes : Patient presents today for a consultation of elevated liver enzymes, which were found via labs with PCP, CMP: AST (High) 75 and ALT (High) 154  Patient denies a history of elevated liver enzymes.  Patient currently denies  jaundice, chills, RUQ pains, dizziness, or easy bruising. She admits fatigue but associate symptoms with Sjrogen's Disease.   RISK FACTORS: Denies Admits Alcohol, drugs, travel outside the [place], NSAIDs, protein supplements, tattoos, piercings,  service, blood transfusion, family history of liver disease or cancer, personal exposure to liver diseases, FPC, rehab   Versepa 1 mg 2 tabs in AM and 2 tabs in PM Mybetriq 50 mg once at bedtime;   Hospital Follow Up :  Last visit (9/15/2020)Denies any hospitalization since last visit.   Last visit (7/28/2020) Patient presented to [facility] on (7/23/2020) at the advise of our office due to uncontrolled severe abdominal pain.    CT Abd/Pelvis with contrast was performed revealing No findings to account for abdominal pain   Patient was discharged (7/23/2020) after labs, ct reviewed. Dr. Shah, radiology consulted advising mesenteric vessels unremarkable. Based on results, pt will be d/c with outpt follow up to ob/gyn and GI, Dr Ivory.     Last visit (7/22/2020)  Denies any hospitalization since last visit.  Last visit (3/31/2020) Denies any hospitalization since last visit.   Patient presented to [facility] ED June 22, 2019 with abdominal pain.  Onset 5 days prior to ER.  Described as a constant pain located right abdomen and flank area.  Denies any aggravating factors. Denies any hospitalization since.     Patient presented to [facility] ED June 22, 2019 with abdominal pain.  Onset 5 days prior to ER.  Described as a constant pain located right abdomen and flank area.  Denies any aggravating factors.;

## 2020-11-10 ENCOUNTER — TELEPHONE ENCOUNTER (OUTPATIENT)
Dept: URBAN - METROPOLITAN AREA CLINIC 35 | Facility: CLINIC | Age: 60
End: 2020-11-10

## 2020-11-21 LAB
% SATURATION: 21
A-1 ANTITRYPSIN MUTATION: (no result)
ACTIN (SMOOTH MUSCLE): <20
ALBUMIN/GLOBULIN RATIO: 1.8
ALBUMIN: 4.6
ALKALINE PHOSPHATASE: 56
ALPHA 2 MACROGLOBULIN: 172
ALPHA-1-ANTITRYPSIN QN: 117
ALT: 63
ALT: 69
ANA SCREEN, IFA: NEGATIVE
APOLIPOPROTEIN A1: 192
AST: 35
BILIRUBIN, DIRECT: 0.1
BILIRUBIN, INDIRECT: 0.2
BILIRUBIN, TOTAL: 0.3
FERRITIN: 73
FIBROSIS INTERPRETATION: (no result)
FIBROSIS SCORE: 0.07
FIBROSIS STAGE: (no result)
FOOTNOTE: (no result)
GGT: 44
GLOBULIN: 2.6
HAPTOGLOBIN: 227
HEPATITIS A IGM: (no result)
HEPATITIS B CORE$ANTIBODY (IGM): (no result)
HEPATITIS B SURFACE$ANTIGEN: (no result)
HEPATITIS C ANTIBODY: (no result)
IMMUNOGLOBULIN A: 319
IMMUNOGLOBULIN A: 319
IMMUNOGLOBULIN G: 916
IMMUNOGLOBULIN M: 81
INR: 1
INTERPRETATION: (no result)
IRON BINDING CAPACITY: 377
IRON, TOTAL: 80
LKM-1 ANTIBODY (IGG): <=20
Lab: (no result)
MITOCHONDRIAL AB SCREEN: NEGATIVE
NECROINFLAMMAT ACT GRADE: (no result)
NECROINFLAMMAT ACT SCORE: 0.33
NECROINFLAMMAT INTERP: (no result)
PROTEIN, TOTAL: 7.2
PT: 10.3
REFERENCE ID: (no result)
REFERRING PHYSICIAN: (no result)
SIGNAL TO CUT-OFF: 0.01
SOLUBLE LIVER ANTIGEN (SLA) AUTOANTIBODY: <20.1
TISSUE TRANSGLUTAMINASE$AB, IGA: 1
TOTAL BILIRUBIN: 0.3

## 2020-11-24 ENCOUNTER — OFFICE VISIT (OUTPATIENT)
Dept: URBAN - METROPOLITAN AREA CLINIC 35 | Facility: CLINIC | Age: 60
End: 2020-11-24

## 2020-11-24 VITALS
DIASTOLIC BLOOD PRESSURE: 82 MMHG | HEART RATE: 103 BPM | SYSTOLIC BLOOD PRESSURE: 128 MMHG | BODY MASS INDEX: 25.61 KG/M2 | TEMPERATURE: 96.6 F | HEIGHT: 64 IN | OXYGEN SATURATION: 98 % | WEIGHT: 150 LBS

## 2020-11-24 RX ORDER — FIDAXOMICIN 200 MG/1
1 TABLET TABLET, FILM COATED ORAL TWICE A DAY
Qty: 10 TABLET | Status: ON HOLD | COMMUNITY
Start: 2020-06-24

## 2020-11-24 RX ORDER — HYDROXYCHLOROQUINE SULFATE 200 MG/1
1 TABLET WITH FOOD OR MILK TABLET ORAL BID
Status: ACTIVE | COMMUNITY

## 2020-11-24 RX ORDER — B-COMPLEX WITH VITAMIN C
1 TABLET TABLET ORAL ONCE A DAY
Status: ACTIVE | COMMUNITY

## 2020-11-24 RX ORDER — VANCOMYCIN HYDROCHLORIDE 125 MG/1
1 CAPSULE CAPSULE ORAL
Qty: 40 | Refills: 0 | Status: ON HOLD | COMMUNITY
Start: 2020-06-03

## 2020-11-24 RX ORDER — MIRABEGRON 50 MG/1
1 TABLET TABLET, FILM COATED, EXTENDED RELEASE ORAL ONCE A DAY
Qty: 30 | Status: ACTIVE | COMMUNITY

## 2020-11-24 RX ORDER — CELECOXIB 100 MG/1
1 CAPSULE WITH FOOD CAPSULE ORAL TWICE A DAY
Qty: 28 CAPSULE | Refills: 0 | Status: ON HOLD | COMMUNITY
Start: 2020-07-24

## 2020-11-24 RX ORDER — ASCORBIC ACID 125 MG
AS DIRECTED TABLET,CHEWABLE ORAL
Status: ACTIVE | COMMUNITY

## 2020-11-24 RX ORDER — GABAPENTIN 100 MG/1
1 CAPSULE CAPSULE ORAL ONCE A DAY
Status: ON HOLD | COMMUNITY

## 2020-11-24 RX ORDER — MESALAMINE 1000 MG/1
1 SUPPOSITORY SUPPOSITORY RECTAL BID
Qty: 60 | Refills: 1 | Status: ON HOLD | COMMUNITY
Start: 2018-07-13

## 2020-11-24 RX ORDER — MULTIVIT-MIN/IRON/FOLIC ACID/K 18-600-40
AS DIRECTED CAPSULE ORAL
Status: ACTIVE | COMMUNITY

## 2020-11-24 RX ORDER — LEVOMEFOLATE/ALGAL OIL 15-90.314
1 CAPSULE CAPSULE ORAL ONCE A DAY
Status: ACTIVE | COMMUNITY

## 2020-11-24 RX ORDER — NEBIVOLOL HYDROCHLORIDE 5 MG/1
1 TABLET TABLET ORAL ONCE A DAY
Status: ACTIVE | COMMUNITY

## 2020-11-24 RX ORDER — TRAMADOL HYDROCHLORIDE 50 MG/1
TABLET, FILM COATED ORAL TWICE DAILY
Status: ON HOLD | COMMUNITY

## 2020-11-24 RX ORDER — SACCHAROMYCES BOULARDII 50 MG
1 CAPSULE CAPSULE ORAL TWICE A DAY
Qty: 60 | Status: ACTIVE | COMMUNITY

## 2020-11-24 RX ORDER — HYDROXYCHLOROQUINE SULFATE 200 MG/1
1 TABLET WITH FOOD OR MILK TABLET ORAL TWICE A DAY
Status: ON HOLD | COMMUNITY

## 2020-11-24 RX ORDER — IBUPROFEN 800 MG/1
TAKE ONE TABLET BY MOUTH THREE TIMES A DAY TABLET, FILM COATED ORAL
Qty: 90 UNSPECIFIED | Refills: 0 | Status: ON HOLD | COMMUNITY

## 2020-11-24 RX ORDER — CHOLESTYRAMINE 4 G/9G
1 PACKET MIXED WITH WATER OR NON-CARBONATED DRINK POWDER, FOR SUSPENSION ORAL TWICE A DAY
Qty: 60 | Refills: 1 | Status: ON HOLD | COMMUNITY
Start: 2020-06-11

## 2020-11-24 RX ORDER — PRAVASTATIN SODIUM 20 MG/1
1 TABLET TABLET ORAL ONCE A DAY
Status: ACTIVE | COMMUNITY

## 2020-11-24 RX ORDER — CIPROFLOXACIN HCL 500 MG
1 TABLET TABLET ORAL
Qty: 20 | Refills: 0 | Status: ON HOLD | COMMUNITY
Start: 2020-03-12

## 2020-11-24 RX ORDER — CHOLECALCIFEROL TAB 50 MCG (2000 UNIT) 50 MCG
1 TABLET TAB ORAL ONCE A DAY
Status: ACTIVE | COMMUNITY

## 2020-11-24 RX ORDER — BUPROPION HYDROCHLORIDE 300 MG/1
1 TABLET IN THE MORNING TABLET, EXTENDED RELEASE ORAL ONCE A DAY
Status: ACTIVE | COMMUNITY

## 2020-11-24 RX ORDER — HYOSCYAMINE SULFATE 0.125 MG
1 TABLET AS NEEDED TABLET ORAL
Qty: 60 TABLET | Refills: 1 | Status: ON HOLD | COMMUNITY
Start: 2018-06-27

## 2020-11-24 RX ORDER — ZOLPIDEM TARTRATE 5 MG/1
(SCHEDULE IV DRUG) TAKE ONE TABLET BY MOUTH AT BEDTIME TABLET, COATED ORAL
Qty: 30 UNSPECIFIED | Refills: 0 | Status: ACTIVE | COMMUNITY

## 2020-11-24 RX ORDER — NITROFURANTOIN MONOHYDRATE/MACROCRYSTALLINE 25; 75 MG/1; MG/1
1 CAPSULE AT BEDTIME WITH FOOD CAPSULE ORAL ONCE A DAY
Qty: 30 | Status: ON HOLD | COMMUNITY

## 2020-11-24 RX ORDER — FENUGREEK SEED/BL.THISTLE/ANIS 340 MG
AS DIRECTED CAPSULE ORAL
Status: ACTIVE | COMMUNITY

## 2020-11-24 RX ORDER — FIDAXOMICIN 200 MG/1
1 TABLET TABLET, FILM COATED ORAL TWICE A DAY
Qty: 20 | Refills: 0 | Status: ON HOLD | COMMUNITY
Start: 2020-06-15

## 2020-11-24 RX ORDER — HYDROCORTISONE ACETATE 0.5 %
AS DIRECTED CREAM (GRAM) TOPICAL
Status: ACTIVE | COMMUNITY

## 2020-11-24 RX ORDER — MONTELUKAST SODIUM 10 MG/1
1 TABLET IN THE EVENING TABLET, FILM COATED ORAL ONCE A DAY
Status: ACTIVE | COMMUNITY

## 2020-11-24 RX ORDER — ICOSAPENT ETHYL 1000 MG/1
2 CAPSULES WITH MEALS CAPSULE ORAL TWICE A DAY
Qty: 120 | Status: ACTIVE | COMMUNITY

## 2020-11-24 RX ORDER — MAGNESIUM OXIDE/MAG AA CHELATE 300 MG
1 CAPSULE WITH A MEAL CAPSULE ORAL ONCE A DAY
Status: ACTIVE | COMMUNITY

## 2020-11-24 RX ORDER — PRASTERONE 6.5 MG/1
1 VAGINAL _INSERT AT BEDTIME INSERT VAGINAL ONCE A DAY
Qty: 30 | Status: ACTIVE | COMMUNITY

## 2020-11-24 RX ORDER — ESOMEPRAZOLE MAGNESIUM 40 MG/1
1 CAPSULE CAPSULE, DELAYED RELEASE ORAL ONCE A DAY
Status: ON HOLD | COMMUNITY

## 2020-11-24 RX ORDER — ELUXADOLINE 75 MG/1
1 TABLET WITH FOOD TABLET, FILM COATED ORAL TWICE A DAY
Status: ON HOLD | COMMUNITY
Start: 2018-09-18

## 2020-11-24 RX ORDER — VORTIOXETINE 20 MG/1
1 TABLET TABLET, FILM COATED ORAL ONCE A DAY
Status: ACTIVE | COMMUNITY

## 2020-11-24 RX ORDER — METRONIDAZOLE 500 MG/1
1 TABLET TABLET, FILM COATED ORAL THREE TIMES A DAY
Qty: 30 | Refills: 0 | Status: ON HOLD | COMMUNITY
Start: 2020-03-12

## 2020-11-24 RX ORDER — RIZATRIPTAN BENZOATE 5 MG/1
1 TABLET ON THE TONGUE AND ALLOW TO DISSOLVE AS NEEDED ONE TIME TABLET, ORALLY DISINTEGRATING ORAL ONCE A DAY
Status: ON HOLD | COMMUNITY

## 2020-11-24 RX ORDER — SULFAMETHOXAZOLE AND TRIMETHOPRIM 800; 160 MG/1; MG/1
1 TABLET TABLET ORAL TWICE A DAY
Qty: 20 | Refills: 0 | Status: ON HOLD | COMMUNITY
Start: 2020-03-13

## 2020-11-24 RX ORDER — COLESEVELAM HYDROCHLORIDE 625 MG/1
3 TABLETS WITH MEALS TABLET, FILM COATED ORAL TWICE A DAY
Status: ON HOLD | COMMUNITY
Start: 2020-06-15

## 2020-11-24 RX ORDER — ALPRAZOLAM 2 MG/1
1 TABLET TABLET, EXTENDED RELEASE ORAL TWICE A DAY
Status: ACTIVE | COMMUNITY

## 2020-11-24 RX ORDER — SPIRONOLACTONE 25 MG/1
1 TABLET TABLET ORAL ONCE A DAY
Status: ACTIVE | COMMUNITY

## 2020-11-24 NOTE — HPI-MIGRATED HPI
;   ;   ;   ;   ;   ;   ;   ;   ;     Abdominal Pain : Patient admits that her abdominal pain has resolved.    Last visit (9/15/2020) Abdominal pain episode after consuming pizza and banana pepper. Symptoms last for a few minutes then dissipate on it's own.   Last visit (7/28/2020) Patient presents today via televisit with consent for a follow up of abdominal pain.  Pain is located just below and periumbilical.  Described as a dull ache that occur daily in an intermittent pattern.  She will wake up with diffuse lower back pain and abdominal pain. Back pain gradually dissipate over the course of an hour,  but the abdominal pain continue to persist.  She report no clear aggravating factor, but occasionally will get relief with laying on her right side.   She is taking Dicyclomine 10 mg 1 TID without significant benefit.  She did not try the Celebrex after reading the warning insert.   She has an appt. with her Urogynecologist on Friday.   Of note, patient presented to [facility] on (7/23/2020) at the advise of our office due to uncontrolled severe abdominal pain.   Last visit (7/22/2020) Of note, patient contact the office on (7/6/2020) stating she completed the Dificid last week and continue to take Welchol 625 MG and hyoscyamine, but continue to have lower abdominal pain.  Pain is constant and described as a cramping pain. She report the hyoscyamine is not as effective.    Symptoms start upon waking up in the mornings and persist throughout the day. Patient state "something's got to give".  Subsequently she was advised to  Stop Hyoscyamine and start Dicyclomine 10 mg po QID prn   Patient reports Dicyclomine 10 mg po QID prn is not effective.    Last visit (06/24/2020)  Patient contact the office on (5/15/2020) c/o continue to experience abdominal pain and fecal incontinence 5+ times a day.   She admit lower abdominal pain below the umbilicus. Symptoms are present most morning upon waking up and will persist throughout the day.  She has been taking Hyoscyamine 2-3 times a day with temporary relief.       Last visit (5/15/2020) Patient presents today via televisit with consent for a follow up of increase LLQ abdominal pain.   Pain has been present constant and described as a dull ache. Denies any aggravating factors.   Admit some relief with the use of Hyoscyamine 1 BID.  She contact the office on (3/8/2020) c/o 5-6 loose BM's per day with fecal seepage and LLQ abdominal pain. Symptoms were improved then began to increase several weeks ago.  Denies melena, blood or mucus in stools. Denies pruritus ani, rectal pain or bleeding.   Abdominal pain is described as a dull ache that occur randomly throughout the day and will last for minutes to hours.  She ran out of the Hyoscyamine weeks ago.  Denies fever or chills.  Subsequently patient was informed to go to ER if LLQ pain is persistent and worse.      Last visit (3/31/2020) Patient presents today via tele visit to review CT, labs, stool study and follow up of LLQ abdominal pain associated with diverticulitis.  She admit improvement of symptoms with the use of Hyoscyamine and Ibuprofen 800 mg prn .  Last episode occurred yesterday.   Described as a sharp pain, that is typically present each day upon waking in the mornings.  She completed treatment for diverticulitis, Bactrim DS and Flagyl on (3/22/2020)   Of note, patient contact the office on (3/13/2020) with c/o 5-6 loose and watery BM's per day and episodes of fecal incontinence since 2 days after her last visit.  Described as full fecal seepage that occur with bending over or walking.   She state she has been treated with 3 antibiotics since Jan. 2020- Cefdnir x 10 days in Jan. 2020 for URI, then Cipro 500 mg 1 BID x 7 days on (2/3/2020), which she took for 3 days then told to d/c and start Moxifloxacin 100 mg x 10 days on (2/11/2020) due to the Diverticulitis.   Subsequently a stool study was ordered, treated with Flagyl 500 mg TID, Bactrim DS BID x 10 days, take Imodium prn with caution.  On (3/19/2020) patient continue to c/o LLQ abdominal pain upon waking up in the mornings. Pain is sharp in nature.  She will take Hyoscyamine, but it will take 1-2 hours to get relief.   Last visit (3/3/2020) Patient presents today for a follow up of abdominal pain. She denies any episodes of abdominal pain since her last office visit.   Patient had a CT Chest/ABD/Pelvis completed on 2/5/2020 showing mild diverticulitis of the proximal descending colon, with mild moderate inflammatory changes, including fat stranding and trace fluid. Moderate diffuse diverticulosis of the colon, more extensively involving the left colon. Groundglass infiltrate in the posterior right upper lobe, suspicious for possible pneumonia. Mild hepatic steatosis.   Patient admits that she was on abx and steroids due to a respiratory infection in December   Last visit (1/15/2019) Patient admits experiencing abdominal pain rarely since her last office visit.    10/16/18 Patient marks improvement of abdominal pain. She states that she has had 4-5 episodes since her last office visit 09/18/2018.  09/18/2018 Patient continues to have abdominal pain daily. She describes episodes as a cramping sensation throughout her lower abdomen. She typically begins to have symptoms after consuming a meal.  She is unsure if symptoms are improved with Dicyclomine 10 mg BID.   Of note, Patient called the office on 09/05/2018 complaining of abdominal cramping and  fecal incontinence. As a result we increased her Welchol 625mg from 2 tabs po bid to 2 tabs po tid.   08/22/2018 She admit improvement of symptoms with the use of Dicyclomine 10 mg BID.    Of note, patient contact the office (8/1/2018) stating the Hyoscyamine wasn't as effective, subsequently she was changed to Dicyclomine 10 mg po TID prn.  Last visit (7/18/2018) Patient continues to have abdominal discomfort. She admits the pain is infrequent and ranges from the left to right side of her abdomen. She continues to deny any aggravating factors.    Last visit (6/26/2018) Patient continues to have right sided abdominal pain accompanied with diarrhea daily. She describes episodes as a severe cramping sensation. She denies any aggravating factors.    04/24/2018 Continues to have right sided abdominal pain , intermittent severity , constant . No clear aggravating or alleviating factors     At last visit (10/31/2017) Admits abdominal pain that is located mid region and occur in an intermittent pattern.  Onset Dec. 2016 right after her hemorrhoidectomy and never got better.  Described as a dull ache that last for minutes to hours at a time.   Symptoms are mostly noticed upon waking up.  Admit relief of symptoms after defecation.     Patient denies any episodes of RUQ abdominal pain this year.  Symptoms were described as a dull aching to crampy and sharp pain.  Pain was initially intermittently, then  began to  worsen first of May. Patient notes as symptoms progressively worsen she went to [facility] 5/3/2016 and at that time her pain was severe, level 8 /10.  The Location of pain at the time she was seen in ER was RUQ abdomen and radiated to right side of her back, and middle of the back. Pain was worse with inhalation.  She had CT Abdomen, Chest Xray and was treated with IV fluids, Morphine with great relief of symptoms. Patient was discharged with [place] 5/325 #12 and to follow up with GI, and PCP.  She denies any abdominal pain since the day after she discharged from [facility].  Patient denies the use of antibiotics within the last (6) months. 	    Outside Labs:  (5/3/2016) Glucose 105 (H), Creatinine 1.16 (H), Protein Total 8.5 (H), ALT 36 (N), AST 20 (N), Albumin 4.5 (N), Total Bilirubin 0.6 (N), Total Calcium 10.6 (H), Alkaline Phosphatase 65 (N), Lipase 391 (N), WBC 7.2 (N), RBC 4.98 (N), Hemoglobin 15.4 (N), Hematocrit 44.4 (H), Platelet 345 (N).    Outside Imaging: (5/3/2016) CT Pelvis with impression: No acute intra-abdominal process. Colonic diverticulosis without diverticulitis. Normal appendix. No bowel obstruction. Persistent prominent appearance of the pancreatic duct measuring up to 3.9 mm in maximal diameter. Fatty liver with hepatomegaly.  (05/15/2015) CT Abdomen and Pelvis with contrast impression: Normal appendix. Descending and sigmoid colon diverticula. No diverticulitis. Borderline pancreatic ductal dilation. No biliary ductal dilation. Further evaluation with MRI of the abdomen and MRCP may be obtained.;   Gas : Patient states she has experienced some gas throughout the day. Admits she has been able to pass the gas. Denies any OTC medications for relief.;   Fecal incontinence : She denies any episodes of fecal incontinence since her last visit.    Last visit (9/15/2020) She report having normal bowel habits over the past month without episodes of fecal incontinence or associated fecal urgency.   Last visit (7/28/2020) Admit random episodes of fecal incontinence with associated fecal urgency.   Last visit (7/22/2020) Continue to experience Fecal incontinence episodes daily.   Symptoms are associated with diarrhea episodes, at least 5-6 times a day.  Last visit (06/24/2020) Continue to experience Fecal incontinence episodes daily associated with diarrhea episodes, 5+ times a day..  She has been wearing under pads due to symptoms.   Last visit (5/11/2020) Admit having fecal incontinence while sitting last week. She has a Cystocele/Rectocele since 2016 and report having Vaginal Pessary Inserted in (2019) performed by Dr. Edie Holden.   Last visit (3/31/2020) Continue to experience Fecal incontinence episodes daily associated with diarrhea episodes.  She has been wearing under pads due to symptoms.    Of note, patient contact the office on (3/13/2020) with c/o 5-6 loose and watery BM's per day and episodes of fecal incontinence since 2 days after her last visit.  Described as full fecal seepage that occur with bending over or walking.      Last visit (3/3/2020) Patient denies episodes of Fecal incontinence since last office visit.  Last visit (1/15/2019) Patient denies experiencing any fecal incontinence episodes since her last office visit.    10/16/18 Patient states that she has had improvement of symptoms. She has had less episodes since having a pessary device inserted.  09/18/2018 Patient continues to have episodes of fecal incontinence daily.   08/22/2018 She continue to experience fecal incontinence. Symptoms occur 1-2 times per day.  She continue to ware a pad in her under garment, because she does not feel the leakage.    Last visit (7/18/2018)Patient continues to have fecal incontinence.   Last visit (6/26/2018) Patient continues to have fecal incontinence. She states that she has seen Dr. Butt since her last visit. Of note, this was prior to her having diarrhea. At the time of her consultation he diagnosed her with cystocele and rectocele. In addition, they discussed her having a device placed in her back to help control her sphincter.   04/24/2018 Continues to have fecal incontinence and will be following up with Dr. ATUL Butt Continue to experience Fecal incontinence episodes daily associated with diarrhea episodes.  she has been wearing under pads due to symptoms.    Of note, patient contact the office on (3/13/2020) with c/o 5-6 loose and watery BM's per day and episodes of fecal incontinence since 2 days after her last visit.  Described as full fecal seepage that occur with bending over or walking.      Last visit (3/3/2020) Patient denies episodes of Fecal incontinence since last office visit.  Last visit (1/15/2019) Patient denies experiencing any fecal incontinence episodes since her last office visit.    10/16/18 Patient states that she has had improvement of symptoms. She has had less episodes since having a pessary device inserted.  09/18/2018 Patient continues to have episodes of fecal incontinence daily.   08/22/2018 She continue to experience fecal incontinence. Symptoms occur 1-2 times per day.  She continue to ware a pad in her under garment, because she does not feel the leakage.    Last visit (7/18/2018)Patient continues to have fecal incontinence.   Last visit (6/26/2018) Patient continues to have fecal incontinence. She states that she has seen Dr. Butt since her last visit. Of note, this was prior to her having diarrhea. At the time of her consultation he diagnosed her with cystocele and rectocele. In addition, they discussed her having a device placed in her back to help control her sphincter.   04/24/2018 Continues to have fecal incontinence and will be following up with Dr. ATUL Butt ;   Abnormal MRI : Last visit (1/15/2019) Patient admits that she has had a heart test completed as well as a MRI of her neck completed in December and she has not yet received the results.  10/16/2018 Denie any recent imaging completed.  09/18/2018 No recent imaging completed.  04/24/2018 Patient presents today for a follow up of her pancreas  divisum which was diagnosed via MRI Abdomen May 29, 2015.  She notes of having low back pain in May 2015 and was seen by her PCP who subsequently ordered an MRI Abdomen w/wo contrast.  (5/29/2015) MR Abdomen w/wo contrast impression: Pancreas divisum with separate drainage of the pancreatic duct and common bile duct ar the duodenum. Mild, diffuse pancreatic ductal dilation without obstructing mass or calculus.    Last visit (1/15/2019) Patient admits that she has had a heart test completed as well as a MRI of her neck completed in December and she has not yet received the results.  10/16/18 No recent imaging completed.  09/18/2018 No recent imaging completed.  04/24/2018 Patient presents today for a follow up of her pancreas  divisum which was diagnosed via MRI Abdomen May 29, 2015.  She notes of having low back pain in May 2015 and was seen by her PCP who subsequently ordered an MRI Abdomen w/wo contrast.  (5/29/2015) MR Abdomen w/wo contrast impression: Pancreas divisum with separate drainage of the pancreatic duct and common bile duct ar the duodenum. Mild, diffuse pancreatic ductal dilation without obstructing mass or calculus. ;   Dysphagia : Patient denies any dysphagia since her last visit.   She denies any episodes dysphagia since her last visit. She does admits that she feels gas at this time and reports that she flatulates often and is unable to control symptoms.    Last visit (9/15/2020) Denies globus or dysphagia.   Last visit (7/28/2020) She has a globus sensation that occur daily.  Denies dysphagia.    Last visit (7/22/2020)  She has a globus sensation since the past three days with regurgitation .   Patient can not recall starting. in her words ( It doesn't matter now ) Famotidine 20 mg po QHS 30-1    She continue to deny episodes of dysphagia Last visit (5/11/2020) She continue to deny episodes of dysphagia  Last visit (3/31/2020) She continue to deny episodes of dyspragia.  Last visit (3/3/2020) Patient denies episodes of dyspragia since last office visit.  Last visit (1/15/2019) Patient denies any episodes of dysphagia since her EGD with dilation was completed.   10/16/18 Patient continues to deny episodes of dysphagia.   09/18/2018 Patient continues to deny episodes of dysphagia.    08/22/2018 Continue to deny dysphagia since EGD w/dilation.    Last visit (7/18/2018) Patient denies recent episodes of dysphagia.  Last visit (6/26/2018) Patient denies recent episodes of dysphagia.  04/24/2018 Continues to have dysphagia with solid food , upper esophagus , few times a week    At last visit (10/31/2017)Difficulty in swallowing since the past year, few times a month associated with dryness of mouth due to Sjogren's She continue to deny episodes of dyspragia.  Last visit (3/3/2020) Patient denies episodes of dyspragia since last office visit.  Last visit (1/15/2019) Patient denies any episodes of dysphagia since her EGD with dilation was completed.   10/16/18 Patient continues to deny episodes of dysphagia.   09/18/2018 Patient continues to deny episodes of dysphagia.    08/22/2018 Continue to deny dysphagia since EGD w/dilation.    Last visit (7/18/2018) Patient denies recent episodes of dysphagia.  Last visit (6/26/2018) Patient denies recent episodes of dysphagia.  04/24/2018 Continues to have dysphagia with solid food , upper esophagus , few times a week    At last visit (10/31/2017)Difficulty in swallowing since the past year, few times a month associated with dryness of mouth due to Sjogren's    ;   Fatty Liver : She continue to deny  jaundice, chills, RUQ pains, dizziness, or light headedness.   Last visit: (11/3/20) Patient currently denies  jaundice, chills, RUQ pains, dizziness, or easy bruising.   Last visit (9/15/2020) She denies jaundice, chills, RUQ pains, dizziness, or light headedness.  Admit increased fatigue over the past month and was experiencing feeling "lethargic".  She saw her Internist last week and report having an extensive amount of blood work completed, which she is awaiting results.    Last visit (7/22/2020) She continue to deny  jaundice, chills, RUQ pains, dizziness, or light headedness   Last visit (3/31/2020) She continue to deny  jaundice, chills, RUQ pains, dizziness, or light headedness. Admit still feeling fatigue she is unsure if this has to do with her Liver or her Sjogren's Disease.   Last visit (3/3/2020) Patient denies jaundice, chills, RUQ pains, dizziness, or light headedness. Admits still feeling fatigue she is unsure if this has to do with her Liver or her Sjogren's Disease.  Last visit (01/15/2019) Patient currently denies jaundice, chills, RUQ pains, dizziness, or light headedness. Admits still feeling fatigue.    10/16/18 Patient was diagnosed with fatty liver in 2017.  She currently denies jaundice, chills, RUQ pains, dizziness, or light headedness Admit easy bruising over past few months, which is being follow by Hematologist Dr. Hakeem Fallon.  Admit several year history of feeling fatigue.    09/18/2018 Patient was diagnosed with fatty liver in 2017.  She currently denies jaundice, chills, RUQ pains, dizziness, or light headedness Admit easy bruising over past few months, which is being follow by Hematologist Dr. Hakeem Fallon.  Admit several year history of feeling fatigue.   08/22/2018 Patient was diagnosed with a fatty liver in 2017. Most recent labs with PCP Dr. Michelle Naidu a month ago due to skin rash from an allergic reaction from an unknown source. Labs completed (7/12/2018) revealed CBC and CMP within normal limits.   She currently denies jaundice, chills, RUQ pains, dizziness, or light headedness Admit easy bruising over past few months, which is being follow by Hematologist Dr. Hakeem Fallon.  Admit several year history of feeling fatigue.    04/24/2018 Patient was found to have fatty liver via CT abdomen on May 3, 2017.  Most recent labs with PCP Dr. Michelle Naidu  Aug. 23, 2017 at which time patient states her liver enzymes were fine, just had elevated calcium levels.  Patient currently denies jaundice, chills, RUQ pains, dizziness, or light headedness Admit easy bruising over past few months, which is being follow by Hematologist Dr. Hakeem Fallon.  Admit several year history of feeling fatigue.   Outside Labs:  (5/3/2016) Glucose 105 (H), Creatinine 1.16 (H), Protein Total 8.5 (H), ALT 36 (N), AST 20 (WNL), Albumin 4.5 (N), Total Bilirubin 0.6 (N), Total Calcium 10.6 (H), Alkaline Phosphatase 65 (N), Lipase 391 (N), WBC 7.2 (N), RBC 4.98 (N), Hemoglobin 15.4 (N), Hematocrit 44.4 (H), Platelet 345 (N).  (10/31/2017)NAFLD Score: -0.979   &lt; -1.455: predictor of absence of significant fibrosis (F0-F2 fibrosis) ? -1.455 to ? 0.675: indeterminate score > 0.675: predictor of presence of significant fibrosis (F3-F4 fibrosis)     Outside Imaging: (5/3/2016) CT Pelvis with impression: No acute intra-abdominal process. Colonic diverticulosis without diverticulitis. Normal appendix. No bowel obstruction. Persistent prominent appearance of the pancreatic duct measuring up to 3.9 mm in maximal diameter. Fatty liver with hepatomegaly.  (05/15/2015) CT Abdomen and Pelvis with contrast impression: Normal appendix. Descending and sigmoid colon diverticula. No diverticulitis. Borderline pancreatic ductal dilation. No biliary ductal dilation. Further evaluation with MRI of the abdomen and MRCP may be obtained. She continue to deny  jaundice, chills, RUQ pains, dizziness, or light headedness. Admit still feeling fatigue she is unsure if this has to do with her Liver or her Sjrogen's Disease.   Last visit (3/3/2020) Patient denies jaundice, chills, RUQ pains, dizziness, or light headedness. Admits still feeling fatigue she is unsure if this has to do with her Liver or her Sjrogen's Disease.  Last visit (01/15/2019) Patient currently denies jaundice, chills, RUQ pains, dizziness, or light headedness. Admits still feeling fatigue.    10/16/18 Patient was diagnosed with fatty liver in 2017.  She currently denies jaundice, chills, RUQ pains, dizziness, or light headedness Admit easy bruising over past few months, which is being follow by Hematologist Dr. Hakeem Fallon.  Admit several year history of feeling fatigue.    09/18/2018 Patient was diagnosed with fatty liver in 2017.  She currently denies jaundice, chills, RUQ pains, dizziness, or light headedness Admit easy bruising over past few months, which is being follow by Hematologist Dr. Hakeem Fallon.  Admit several year history of feeling fatigue.   08/22/2018 Patient was diagnosed with a fatty liver in 2017. Most recent labs with PCP Dr. Michelle Naidu a month ago due to skin rash from an allergic reaction from an unknown source. Labs completed (7/12/2018) revealed CBC and CMP within normal limits.   She currently denies jaundice, chills, RUQ pains, dizziness, or light headedness Admit easy bruising over past few months, which is being follow by Hematologist Dr. Hakeem Fallon.  Admit several year history of feeling fatigue.    04/24/2018 Patient was found to have fatty liver via CT abdomen on May 3, 2017.  Most recent labs with PCP Dr. Michelle Naidu  Aug. 23, 2017 at which time patient states her liver enzymes were fine, just had elevated calcium levels.  Patient currently denies jaundice, chills, RUQ pains, dizziness, or light headedness Admit easy bruising over past few months, which is being follow by Hematologist Dr. Hakeem Fallon.  Admit several year history of feeling fatigue.   Outside Labs:  (5/3/2016) Glucose 105 (H), Creatinine 1.16 (H), Protein Total 8.5 (H), ALT 36 (N), AST 20 (WNL), Albumin 4.5 (N), Total Bilirubin 0.6 (N), Total Calcium 10.6 (H), Alkaline Phosphatase 65 (N), Lipase 391 (N), WBC 7.2 (N), RBC 4.98 (N), Hemoglobin 15.4 (N), Hematocrit 44.4 (H), Platelet 345 (N).  (10/31/2017)NAFLD Score: -0.979   &lt; -1.455: predictor of absence of significant fibrosis (F0-F2 fibrosis) ? -1.455 to ? 0.675: indeterminate score > 0.675: predictor of presence of significant fibrosis (F3-F4 fibrosis)     Outside Imaging: (5/3/2016) CT Pelvis with impression: No acute intra-abdominal process. Colonic diverticulosis without diverticulitis. Normal appendix. No bowel obstruction. Persistent prominent appearance of the pancreatic duct measuring up to 3.9 mm in maximal diameter. Fatty liver with hepatomegaly.  (05/15/2015) CT Abdomen and Pelvis with contrast impression: Normal appendix. Descending and sigmoid colon diverticula. No diverticulitis. Borderline pancreatic ductal dilation. No biliary ductal dilation. Further evaluation with MRI of the abdomen and MRCP may be obtained. ;   Diarrhea : Patient denies any episodes of diarrhea.    Last visit: (11/3/20) She denies any episodes of diarrhea. Patient currently admits 1-2 bowel movements a day with no strain. Her stools are formed without blood, mucus, melena, pruritus ani, or rectal pain.    Last visit (9/15/2020) Patient presents today for a follow up of diarrhea.  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  Treatment consisted of Dificid and Zinplava ( Infusion ), which she completed on (8/18/2020) with marked improvement of symptoms.  She report having normal bowel habits over the past month.  Currently admit 2-4 normal and formed bowel movements per day. Denies melena, blood or mucus in stools.     Patient state she had a follow up with Uro-Gynecologist Dr. Holden suggest she had a 2nd opinion with another GI.  Also she did a rectal exam and was told her sphincter muscle was a 5/10.  She is currently taking Dicyclomine HCl 10 MG and Welchol 3 BID.  She report having yellowish orange stool when she have fecal incontinence, which occur randomly.  Last episodes occured 5 days ago. Denies melena, blood or mucus in stools.      Last visit (7/28/2020) Currently admit 3 normal and formed evacuations and 1 episodes of watery bowel movements.  Admit associated episodes of fecal incontinence.  She continue to take Continue Welchol 625 MG, 3 BID.  She did not submit the stool study as ordered by Dr. Gallagher yesterday.   Last visit (7/22/2020) Patient admits to 5-6 bowel episodes of diarrhea a day.   Of note, patient contact the office on (7/6/2020) stating she completed the Dificid last week and continue to take Welchol 625 MG She denies diarrhea episodes over the past 2 weeks.  Stools are small semi-formed.  Denies straining. She want to discuss fecal transplant via pill form and Interstim.        Last visit (06/24/2020)  Patient presents today for a follow up of diarrhea, review MRI, and QDx results.  Patient was notified on (6/3/2020) QDx stool study was positive for C. Diff, subsequently she was treated with Vancomycin 125 mg po QID for 10 days.  Patient contact the office on (6/15/2020) c/o symptoms are no better. Also continue to experience abdominal pain and fecal incontinence 5+ times a day.    She also want to know if the cholestyramine come in a tablet form, because it say it can damage teeth, which she already has risk of dental damage from her Sjogren's.   Subsequently, patient was treated with Dificid 200 mg po BID for 10 days. Welchol 625 mg III po BID and Imodium - 1-2,  4 times a day prn.  She has not taken Imodium.   She admit bowel movements were getting back to normal last week over the course of 3 days while on a bland diet with having 3 normal formed BM's per day.  While off the diet she would have 3-5 BM's per day with a couple episodes fecal incontinence.  Denies melena, blood or mucus in stools.     Last visit (5/11/2020) Admit 5-6 loose BM's per day with fecal seepage and LLQ abdominal pain. Symptoms were improved then began to increase several weeks ago.  Denies melena, blood or mucus in stools.  Denies pruritus ani, rectal pain or bleeding.    She report maybe 1-2 days a week she will have normal and formed bowel movements.     Last visit (3/31/2020) Admit 5-6 bowel movements per day with 2-3 episodes of diarrhea at least once a day since last office visit.  Denies melena, blood or mucus in stools.  Of note, patient contact the office on (3/13/2020) with c/o 5-6 loose and watery BM's per day and episodes of fecal incontinence since 2 days after her last visit.  Described as full fecal seepage that occur with bending over or walking.    Subsequently a stool study was ordered, treated with Flagyl 500 mg TID, Bactrim DS BID x 10 days, take Imodium prn with caution.  She completed the 10 day course of Flagyl 500 mg TID, Bactrim DS BID on (3/22/2020).   Last visit (3/3/2020)She reports 2-3 bowel movements a day with no strain. She admits her stools are formed with no bleeding, melena, or mucus at this time.   Last visit (1/15/2019) Patient presents today for a follow up of diarrhea. Patient admits rarely experiencing diarrhea since her last office visit. Patient admits 1-3 bowel movements a day. Stools are normal in form.   Patient admits that she is scheduled to see Dr Holden tomorrow.       Patient denies the use of Welchol Tablet, 625 MG, 2 tablets prn with meals, Orally, TID. Patient states that she wanted to clear her body from the medications that she was taking.   Patient denies the use of Dicyclomine HCl Capsule, 10 MG, 1 tablet prn, Orally, TID. Patient states that she felt that this medication wasn't helping her so she stopped taking it.    10/16/18 Patient presents today for a follow up of diarrhea. She states there has been an improvement of symptoms and she admits to having 2-3 bowel movements per day. Ranging from solid to loose stools.   Patient was evaluated by Dr. Holden a Urogynecologist at [facility] who administered a pessary, which is a prosthetic device that can be inserted into the vagina to support its internal structure. She was advised this device tends to improve diarrhea and fecal incontinence in patients that have rectocele. Patient also has started Pelvic Floor Therapy and has completed two sessions.   Of note, patient never started Viberzi Tablet, 75 MG.   09/18/2018 Patient presents today for a follow up of diarrhea. She continues to have episodes of watery/loose stools daily. She states she can have up to 5-6 bowel movements per day. She denies blood, mucous or melena.    08/22/2018 Patient presents today to review QDx stool study, lab results and follow up of diarrhea. She has been taking Welchol 625 MG, 2 BID with some improvement in bowel habits.  Currently admit 2-3 defecations per day.  Stools are 50% of the time normal formed and loose/watery.  Denies melena, blood or mucus in stools.  Denies fecal urgency.  Continue to admit episodes of fecal incontinence.      Last visit (7/18/2018) Patient presents today for a follow up after her colonoscopy, EGD and labs.  She denies any complications after her procedure. She continues to have 3-4 bowel movements per day ranging from solid to watery/loose. She denies mucous or melena. She does admit to occasional bright red blood present on toilet paper following a bowel movement. She denies taking  Imodium A-D or Hyoscyamine.  Last visit (6/26/2018) Patient presents today for a consultation of diarrhea since the past 6 months . She states that symptoms started around June 9 th, 2018 after eating dinner. She was evaluated by [facility] on Hannah 10, 2018 for her symptoms. At that time stool studies were completed. She is currently having approximately five watery/loose stools per day. She denies any blood, mucous or melena. However, patient admits the amount of stools may increase if she consumes more food. She has been avoiding eating because that triggers her to have a bowel movement. She has had an eight pound unintentional weight loss since her last visit on 04/24/2018.    She did have an MRI of the abdomen completed on 04/06/2018 which revealed subtle sludge or concentrated bile within the gallbladder.  Per patient her last colonoscopy was 2-3 years ago. She denies having colon polyps at that time.  Of, note patient was prescribed Keflex on May 20, 2018. Admit 5-6 bowel movements per day with 2-3 episodes of diarrhea at least once a day since last office visit.  Denies melena, blood or mucus in stools.  Of note, patient contact the office on (3/13/2020) with c/o 5-6 loose and watery BM's per day and episodes of fecal incontinence since 2 days after her last visit.  Described as full fecal seepage that occur with bending over or walking.    Subsequently a stool study was ordered, treated with Flagyl 500 mg TID, Bactrim DS BID x 10 days, take Imodium prn with caution.  She completed the 10 day course of Flagyl 500 mg TID, Bactrim DS BID on (3/22/2020).   Last visit (3/3/2020)She reports 2-3 bowel movements a day with no strain. She admits her stools are formed with no bleeding, melena, or mucus at this time.   Last visit (1/15/2019) Patient presents today for a follow up of diarrhea. Patient admits rarely experiencing diarrhea since her last office visit. Patient admits 1-3 bowel movements a day. Stools are normal in form.   Patient admits that she is scheduled to see Dr Holden tomorrow.       Patient denies the use of Welchol Tablet, 625 MG, 2 tablets prn with meals, Orally, TID. Patient states that she wanted to clear her body from the medications that she was taking.   Patient denies the use of Dicyclomine HCl Capsule, 10 MG, 1 tablet prn, Orally, TID. Patient states that she felt that this medication wasn't helping her so she stopped taking it.    10/16/18 Patient presents today for a follow up of diarrhea. She states there has been an improvement of symptoms and she admits to having 2-3 bowel movements per day. Ranging from solid to loose stools.   Patient was evaluated by Dr. Holden a Urogynecologist at [facility] who administered a pessary, which is a prosthetic device that can be inserted into the vagina to support its internal structure. She was advised this device tends to improve diarrhea and fecal incontinence in patients that have rectocele. Patient also has started Pelvic Floor Therapy and has completed two sessions.   Of note, patient never started Viberzi Tablet, 75 MG.   09/18/2018 Patient presents today for a follow up of diarrhea. She continues to have episodes of watery/loose stools daily. She states she can have up to 5-6 bowel movements per day. She denies blood, mucous or melena.    08/22/2018 Patient presents today to review QDx stool study, lab results and follow up of diarrhea. She has been taking Welchol 625 MG, 2 BID with some improvement in bowel habits.  Currently admit 2-3 defecations per day.  Stools are 50% of the time normal formed and loose/watery.  Denies melena, blood or mucus in stools.  Denies fecal urgency.  Continue to admit episodes of fecal incontinence.      Last visit (7/18/2018) Patient presents today for a follow up after her colonoscopy, EGD and labs.  She denies any complications after her procedure. She continues to have 3-4 bowel movements per day ranging from solid to watery/loose. She denies mucous or melena. She does admit to occasional bright red blood present on toilet paper following a bowel movement. She denies taking  Imodium A-D or Hyoscyamine.  Last visit (6/26/2018) Patient presents today for a consultation of diarrhea since the past 6 months . She states that symptoms started around June 9 th, 2018 after eating dinner. She was evaluated by [facility] on Hannah 10, 2018 for her symptoms. At that time stool studies were completed. She is currently having approximately five watery/loose stools per day. She denies any blood, mucous or melena. However, patient admits the amount of stools may increase if she consumes more food. She has been avoiding eating because that triggers her to have a bowel movement. She has had an eight pound unintentional weight loss since her last visit on 04/24/2018.    She did have an MRI of the abdomen completed on 04/06/2018 which revealed subtle sludge or concentrated bile within the gallbladder.  Per patient her last colonoscopy was 2-3 years ago. She denies having colon polyps at that time.  Of, note patient was prescribed Keflex on May 20, 2018.;   Elevated Liver Enzymes : 59 Y/O female presents today to review labs and follow up for elevated liver enzymes.  Labs completed on (11/10/2020) CMP: ALT (High) 63. Hepatic Function Panel: ALT (High) 69.   Currently denies  jaundice, chills, RUQ pains, dizziness, or easy bruising.  Admits to having ocassional episodes of gas.   Last visit: (11/03/20) Patient presents today for a consultation of elevated liver enzymes, which were found via labs with PCP, CMP: AST (High) 75 and ALT (High) 154  Patient denies a history of elevated liver enzymes.  Patient currently denies  jaundice, chills, RUQ pains, dizziness, or easy bruising. She admits fatigue but associate symptoms with Sjogren's Disease.   RISK FACTORS: Denies Admits Alcohol, drugs, travel outside the [place], NSAID's, protein supplements, tattoos, piercing's,  service, blood transfusion, family history of liver disease or cancer, personal exposure to liver diseases, custodial, rehab   Versepa 1 mg 2 tabs in AM and 2 tabs in PM Mybetriq 50 mg once at bedtime;   Hospital Follow Up : Patient denies any recent hospitalization since last visit.   Last visit (9/15/2020)Denies any hospitalization since last visit.   Last visit (7/28/2020) Patient presented to [facility] on (7/23/2020) at the advise of our office due to uncontrolled severe abdominal pain.    CT Abd/Pelvis with contrast was performed revealing No findings to account for abdominal pain   Patient was discharged (7/23/2020) after labs, ct reviewed. Dr. Shah, radiology consulted advising mesenteric vessels unremarkable. Based on results, pt will be d/c with outpt follow up to ob/gyn and GI, Dr Ivoyr.     Last visit (7/22/2020)  Denies any hospitalization since last visit.  Last visit (3/31/2020) Denies any hospitalization since last visit.   Patient presented to [facility] ED June 22, 2019 with abdominal pain.  Onset 5 days prior to ER.  Described as a constant pain located right abdomen and flank area.  Denies any aggravating factors. Denies any hospitalization since.     Patient presented to [facility] ED June 22, 2019 with abdominal pain.  Onset 5 days prior to ER.  Described as a constant pain located right abdomen and flank area.  Denies any aggravating factors.;

## 2020-12-15 ENCOUNTER — OFFICE VISIT (OUTPATIENT)
Dept: URBAN - METROPOLITAN AREA CLINIC 35 | Facility: CLINIC | Age: 60
End: 2020-12-15

## 2020-12-31 ENCOUNTER — TELEPHONE ENCOUNTER (OUTPATIENT)
Dept: URBAN - METROPOLITAN AREA CLINIC 35 | Facility: CLINIC | Age: 60
End: 2020-12-31

## 2021-01-04 ENCOUNTER — TELEPHONE ENCOUNTER (OUTPATIENT)
Dept: URBAN - METROPOLITAN AREA CLINIC 35 | Facility: CLINIC | Age: 61
End: 2021-01-04

## 2021-01-08 ENCOUNTER — TELEPHONE ENCOUNTER (OUTPATIENT)
Dept: URBAN - METROPOLITAN AREA CLINIC 35 | Facility: CLINIC | Age: 61
End: 2021-01-08

## 2021-01-12 ENCOUNTER — OFFICE VISIT (OUTPATIENT)
Dept: URBAN - METROPOLITAN AREA CLINIC 31 | Facility: CLINIC | Age: 61
End: 2021-01-12

## 2021-01-12 VITALS
OXYGEN SATURATION: 97 % | HEART RATE: 101 BPM | WEIGHT: 144 LBS | BODY MASS INDEX: 24.59 KG/M2 | DIASTOLIC BLOOD PRESSURE: 68 MMHG | TEMPERATURE: 97.2 F | HEIGHT: 64 IN | SYSTOLIC BLOOD PRESSURE: 118 MMHG

## 2021-01-12 RX ORDER — ELUXADOLINE 75 MG/1
1 TABLET WITH FOOD TABLET, FILM COATED ORAL TWICE A DAY
Status: ON HOLD | COMMUNITY
Start: 2018-09-18

## 2021-01-12 RX ORDER — HYDROXYCHLOROQUINE SULFATE 200 MG/1
1 TABLET WITH FOOD OR MILK TABLET ORAL BID
Status: ACTIVE | COMMUNITY

## 2021-01-12 RX ORDER — COLESEVELAM HYDROCHLORIDE 625 MG/1
3 TABLETS WITH MEALS TABLET, FILM COATED ORAL TWICE A DAY
Status: ON HOLD | COMMUNITY
Start: 2020-06-15

## 2021-01-12 RX ORDER — MONTELUKAST SODIUM 10 MG/1
1 TABLET IN THE EVENING TABLET, FILM COATED ORAL ONCE A DAY
Status: ACTIVE | COMMUNITY

## 2021-01-12 RX ORDER — BUPROPION HYDROCHLORIDE 300 MG/1
1 TABLET IN THE MORNING TABLET, EXTENDED RELEASE ORAL ONCE A DAY
Status: ACTIVE | COMMUNITY

## 2021-01-12 RX ORDER — NITROFURANTOIN MONOHYDRATE/MACROCRYSTALLINE 25; 75 MG/1; MG/1
1 CAPSULE AT BEDTIME WITH FOOD CAPSULE ORAL ONCE A DAY
Qty: 30 | Status: ON HOLD | COMMUNITY

## 2021-01-12 RX ORDER — ESOMEPRAZOLE MAGNESIUM 40 MG/1
1 CAPSULE CAPSULE, DELAYED RELEASE ORAL ONCE A DAY
Status: ON HOLD | COMMUNITY

## 2021-01-12 RX ORDER — NEBIVOLOL HYDROCHLORIDE 5 MG/1
1 TABLET TABLET ORAL ONCE A DAY
Status: ACTIVE | COMMUNITY

## 2021-01-12 RX ORDER — IBUPROFEN 800 MG/1
TAKE ONE TABLET BY MOUTH THREE TIMES A DAY TABLET, FILM COATED ORAL
Qty: 90 UNSPECIFIED | Refills: 0 | Status: ACTIVE | COMMUNITY

## 2021-01-12 RX ORDER — MESALAMINE 1000 MG/1
1 SUPPOSITORY SUPPOSITORY RECTAL BID
Qty: 60 | Refills: 1 | Status: ON HOLD | COMMUNITY
Start: 2018-07-13

## 2021-01-12 RX ORDER — HYDROXYCHLOROQUINE SULFATE 200 MG/1
1 TABLET WITH FOOD OR MILK TABLET ORAL TWICE A DAY
Status: ON HOLD | COMMUNITY

## 2021-01-12 RX ORDER — PRASTERONE 6.5 MG/1
1 VAGINAL _INSERT AT BEDTIME INSERT VAGINAL ONCE A DAY
Qty: 30 | Status: ACTIVE | COMMUNITY

## 2021-01-12 RX ORDER — B-COMPLEX WITH VITAMIN C
1 TABLET TABLET ORAL ONCE A DAY
Status: ACTIVE | COMMUNITY

## 2021-01-12 RX ORDER — GABAPENTIN 100 MG/1
1 CAPSULE CAPSULE ORAL ONCE A DAY
Status: ON HOLD | COMMUNITY

## 2021-01-12 RX ORDER — FIDAXOMICIN 200 MG/1
1 TABLET TABLET, FILM COATED ORAL TWICE A DAY
Qty: 20 | Refills: 0 | Status: ON HOLD | COMMUNITY
Start: 2020-06-15

## 2021-01-12 RX ORDER — MULTIVIT-MIN/IRON/FOLIC ACID/K 18-600-40
AS DIRECTED CAPSULE ORAL
Status: ACTIVE | COMMUNITY

## 2021-01-12 RX ORDER — HYOSCYAMINE SULFATE 0.125 MG
1 TABLET AS NEEDED TABLET ORAL
Qty: 60 TABLET | Refills: 1 | Status: ON HOLD | COMMUNITY
Start: 2018-06-27

## 2021-01-12 RX ORDER — ICOSAPENT ETHYL 1000 MG/1
2 CAPSULES WITH MEALS CAPSULE ORAL TWICE A DAY
Qty: 120 | Status: ACTIVE | COMMUNITY

## 2021-01-12 RX ORDER — FENUGREEK SEED/BL.THISTLE/ANIS 340 MG
AS DIRECTED CAPSULE ORAL
Status: ACTIVE | COMMUNITY

## 2021-01-12 RX ORDER — CHOLECALCIFEROL TAB 50 MCG (2000 UNIT) 50 MCG
1 TABLET TAB ORAL ONCE A DAY
Status: ACTIVE | COMMUNITY

## 2021-01-12 RX ORDER — CIPROFLOXACIN HCL 500 MG
1 TABLET TABLET ORAL
Qty: 20 | Refills: 0 | Status: ON HOLD | COMMUNITY
Start: 2020-03-12

## 2021-01-12 RX ORDER — CHOLESTYRAMINE 4 G/9G
1 PACKET MIXED WITH WATER OR NON-CARBONATED DRINK POWDER, FOR SUSPENSION ORAL TWICE A DAY
Qty: 60 | Refills: 1 | Status: ON HOLD | COMMUNITY
Start: 2020-06-11

## 2021-01-12 RX ORDER — CIPROFLOXACIN 500 MG/1
TAKE ONE TABLET BY MOUTH EVERY 12 HOURS FOR 10 DAYS TABLET, FILM COATED ORAL
Qty: 20 UNSPECIFIED | Refills: 0 | Status: ON HOLD | COMMUNITY

## 2021-01-12 RX ORDER — VANCOMYCIN HYDROCHLORIDE 125 MG/1
1 CAPSULE CAPSULE ORAL
Qty: 40 | Refills: 0 | Status: ON HOLD | COMMUNITY
Start: 2020-06-03

## 2021-01-12 RX ORDER — METRONIDAZOLE 500 MG/1
1 TABLET TABLET, FILM COATED ORAL THREE TIMES A DAY
Qty: 30 | Refills: 0 | Status: ON HOLD | COMMUNITY
Start: 2020-03-12

## 2021-01-12 RX ORDER — CELECOXIB 100 MG/1
1 CAPSULE WITH FOOD CAPSULE ORAL TWICE A DAY
Qty: 28 CAPSULE | Refills: 0 | Status: ON HOLD | COMMUNITY
Start: 2020-07-24

## 2021-01-12 RX ORDER — HYDROCORTISONE ACETATE 0.5 %
AS DIRECTED CREAM (GRAM) TOPICAL
Status: ACTIVE | COMMUNITY

## 2021-01-12 RX ORDER — MIRABEGRON 50 MG/1
1 TABLET TABLET, FILM COATED, EXTENDED RELEASE ORAL ONCE A DAY
Qty: 30 | Status: ACTIVE | COMMUNITY

## 2021-01-12 RX ORDER — MAGNESIUM OXIDE/MAG AA CHELATE 300 MG
1 CAPSULE WITH A MEAL CAPSULE ORAL ONCE A DAY
Status: ACTIVE | COMMUNITY

## 2021-01-12 RX ORDER — SULFAMETHOXAZOLE AND TRIMETHOPRIM 800; 160 MG/1; MG/1
1 TABLET TABLET ORAL TWICE A DAY
Qty: 20 | Refills: 0 | Status: ON HOLD | COMMUNITY
Start: 2020-03-13

## 2021-01-12 RX ORDER — METRONIDAZOLE 500 MG/1
TAKE ONE TABLET BY MOUTH EVERY 8 HOURS FOR 10 DAYS TABLET, FILM COATED ORAL
Qty: 30 UNSPECIFIED | Refills: 0 | Status: ON HOLD | COMMUNITY

## 2021-01-12 RX ORDER — VORTIOXETINE 20 MG/1
1 TABLET TABLET, FILM COATED ORAL ONCE A DAY
Status: ACTIVE | COMMUNITY

## 2021-01-12 RX ORDER — ALPRAZOLAM 2 MG/1
1 TABLET TABLET, EXTENDED RELEASE ORAL TWICE A DAY
Status: ACTIVE | COMMUNITY

## 2021-01-12 RX ORDER — ASCORBIC ACID 125 MG
AS DIRECTED TABLET,CHEWABLE ORAL
Status: ACTIVE | COMMUNITY

## 2021-01-12 RX ORDER — FIDAXOMICIN 200 MG/1
1 TABLET TABLET, FILM COATED ORAL TWICE A DAY
Qty: 10 TABLET | Status: ON HOLD | COMMUNITY
Start: 2020-06-24

## 2021-01-12 RX ORDER — RIZATRIPTAN BENZOATE 5 MG/1
1 TABLET ON THE TONGUE AND ALLOW TO DISSOLVE AS NEEDED ONE TIME TABLET, ORALLY DISINTEGRATING ORAL ONCE A DAY
Status: ON HOLD | COMMUNITY

## 2021-01-12 RX ORDER — SACCHAROMYCES BOULARDII 50 MG
1 CAPSULE CAPSULE ORAL TWICE A DAY
Qty: 60 | Status: ACTIVE | COMMUNITY

## 2021-01-12 RX ORDER — ACETAMINOPHEN 500 MG/1
TAKE TWO TABLETS BY MOUTH THREE TIMES A DAY AS NEEDED FOR PAIN TABLET ORAL
Qty: 180 UNSPECIFIED | Refills: 0 | Status: ACTIVE | COMMUNITY

## 2021-01-12 RX ORDER — ZOLPIDEM TARTRATE 5 MG/1
1 TABLET AT BEDTIME TABLET, FILM COATED ORAL ONCE A DAY
Status: ACTIVE | COMMUNITY
Start: 2021-01-12

## 2021-01-12 RX ORDER — TRAMADOL HYDROCHLORIDE 50 MG/1
TABLET, FILM COATED ORAL TWICE DAILY
Status: ON HOLD | COMMUNITY

## 2021-01-12 RX ORDER — LEVOMEFOLATE/ALGAL OIL 15-90.314
1 CAPSULE CAPSULE ORAL ONCE A DAY
Status: ACTIVE | COMMUNITY

## 2021-01-12 RX ORDER — SACCHAROMYCES BOULARDII 50 MG
1 CAPSULE CAPSULE ORAL TWICE A DAY
Qty: 60 | OUTPATIENT
Start: 2021-01-12

## 2021-01-12 RX ORDER — PRAVASTATIN SODIUM 20 MG/1
1 TABLET TABLET ORAL ONCE A DAY
Status: ACTIVE | COMMUNITY

## 2021-01-12 RX ORDER — ZOLPIDEM TARTRATE 5 MG/1
(SCHEDULE IV DRUG) TAKE ONE TABLET BY MOUTH AT BEDTIME TABLET, COATED ORAL
Qty: 30 UNSPECIFIED | Refills: 0 | Status: DISCONTINUED | COMMUNITY

## 2021-01-12 RX ORDER — SPIRONOLACTONE 25 MG/1
1 TABLET TABLET ORAL ONCE A DAY
Status: ACTIVE | COMMUNITY

## 2021-01-12 NOTE — HPI-MIGRATED HPI
;   ;   ;   ;   ;   ;   ;   ;   ;     Abdominal Pain : Patient was diagnosed with Diverticulitis via CT Abdomen at [facility] on (12/31/2020) ordered by Urogynecologist Dr. Matti Gutierrez following c/o severe LLQ abdominal pain.  Subsequently she was treated with Flagyl 500 mg 1 TID and Cipro BID x 10 days.  Dr. Gallagher added prophylactic treatment with Dificid 200 mg 1 BID.  She completed the antibiotics yesterday.  She continue to admit intermittent LLQ abdominal pain, which she isn't sure if it's post op pain from her Laparoscopic supracervical hysterectomy and bilateral salpingectomy, and Laparoscopic sacrocervicopexy with Restorelle "Y"-shaped mesh performed on (12/14/2020).    Last visit (11/24/2020) Patient admits that her abdominal pain has resolved.    Last visit (9/15/2020) Abdominal pain episode after consuming pizza and banana pepper. Symptoms last for a few minutes then dissipate on it's own.   Last visit (7/28/2020) Patient presents today via televisit with consent for a follow up of abdominal pain.  Pain is located just below and periumbilical.  Described as a dull ache that occur daily in an intermittent pattern.  She will wake up with diffuse lower back pain and abdominal pain. Back pain gradually dissipate over the course of an hour,  but the abdominal pain continue to persist.  She report no clear aggravating factor, but occasionally will get relief with laying on her right side.   She is taking Dicyclomine 10 mg 1 TID without significant benefit.  She did not try the Celebrex after reading the warning insert.   She has an appt. with her Urogynecologist on Friday.   Of note, patient presented to [facility] on (7/23/2020) at the advise of our office due to uncontrolled severe abdominal pain.   Last visit (7/22/2020) Of note, patient contact the office on (7/6/2020) stating she completed the Dificid last week and continue to take Welchol 625 MG and hyoscyamine, but continue to have lower abdominal pain.  Pain is constant and described as a cramping pain. She report the hyoscyamine is not as effective.    Symptoms start upon waking up in the mornings and persist throughout the day. Patient state "something's got to give".  Subsequently she was advised to  Stop Hyoscyamine and start Dicyclomine 10 mg po QID prn   Patient reports Dicyclomine 10 mg po QID prn is not effective.    Last visit (06/24/2020)  Patient contact the office on (5/15/2020) c/o continue to experience abdominal pain and fecal incontinence 5+ times a day.   She admit lower abdominal pain below the umbilicus. Symptoms are present most morning upon waking up and will persist throughout the day.  She has been taking Hyoscyamine 2-3 times a day with temporary relief.       Last visit (5/15/2020) Patient presents today via televisit with consent for a follow up of increase LLQ abdominal pain.   Pain has been present constant and described as a dull ache. Denies any aggravating factors.   Admit some relief with the use of Hyoscyamine 1 BID.  She contact the office on (3/8/2020) c/o 5-6 loose BM's per day with fecal seepage and LLQ abdominal pain. Symptoms were improved then began to increase several weeks ago.  Denies melena, blood or mucus in stools. Denies pruritus ani, rectal pain or bleeding.   Abdominal pain is described as a dull ache that occur randomly throughout the day and will last for minutes to hours.  She ran out of the Hyoscyamine weeks ago.  Denies fever or chills.  Subsequently patient was informed to go to ER if LLQ pain is persistent and worse.      Last visit (3/31/2020) Patient presents today via tele visit to review CT, labs, stool study and follow up of LLQ abdominal pain associated with diverticulitis.  She admit improvement of symptoms with the use of Hyoscyamine and Ibuprofen 800 mg prn .  Last episode occurred yesterday.   Described as a sharp pain, that is typically present each day upon waking in the mornings.  She completed treatment for diverticulitis, Bactrim DS and Flagyl on (3/22/2020)   Of note, patient contact the office on (3/13/2020) with c/o 5-6 loose and watery BM's per day and episodes of fecal incontinence since 2 days after her last visit.  Described as full fecal seepage that occur with bending over or walking.   She state she has been treated with 3 antibiotics since Jan. 2020- Cefdnir x 10 days in Jan. 2020 for URI, then Cipro 500 mg 1 BID x 7 days on (2/3/2020), which she took for 3 days then told to d/c and start Moxifloxacin 100 mg x 10 days on (2/11/2020) due to the Diverticulitis.   Subsequently a stool study was ordered, treated with Flagyl 500 mg TID, Bactrim DS BID x 10 days, take Imodium prn with caution.  On (3/19/2020) patient continue to c/o LLQ abdominal pain upon waking up in the mornings. Pain is sharp in nature.  She will take Hyoscyamine, but it will take 1-2 hours to get relief.   Last visit (3/3/2020) Patient presents today for a follow up of abdominal pain. She denies any episodes of abdominal pain since her last office visit.   Patient had a CT Chest/ABD/Pelvis completed on 2/5/2020 showing mild diverticulitis of the proximal descending colon, with mild moderate inflammatory changes, including fat stranding and trace fluid. Moderate diffuse diverticulosis of the colon, more extensively involving the left colon. Groundglass infiltrate in the posterior right upper lobe, suspicious for possible pneumonia. Mild hepatic steatosis.   Patient admits that she was on abx and steroids due to a respiratory infection in December   Last visit (1/15/2019) Patient admits experiencing abdominal pain rarely since her last office visit.    10/16/18 Patient marks improvement of abdominal pain. She states that she has had 4-5 episodes since her last office visit 09/18/2018.  09/18/2018 Patient continues to have abdominal pain daily. She describes episodes as a cramping sensation throughout her lower abdomen. She typically begins to have symptoms after consuming a meal.  She is unsure if symptoms are improved with Dicyclomine 10 mg BID.   Of note, Patient called the office on 09/05/2018 complaining of abdominal cramping and  fecal incontinence. As a result we increased her Welchol 625mg from 2 tabs po bid to 2 tabs po tid.   08/22/2018 She admit improvement of symptoms with the use of Dicyclomine 10 mg BID.    Of note, patient contact the office (8/1/2018) stating the Hyoscyamine wasn't as effective, subsequently she was changed to Dicyclomine 10 mg po TID prn.  Last visit (7/18/2018) Patient continues to have abdominal discomfort. She admits the pain is infrequent and ranges from the left to right side of her abdomen. She continues to deny any aggravating factors.    Last visit (6/26/2018) Patient continues to have right sided abdominal pain accompanied with diarrhea daily. She describes episodes as a severe cramping sensation. She denies any aggravating factors.    04/24/2018 Continues to have right sided abdominal pain , intermittent severity , constant . No clear aggravating or alleviating factors     At last visit (10/31/2017) Admits abdominal pain that is located mid region and occur in an intermittent pattern.  Onset Dec. 2016 right after her hemorrhoidectomy and never got better.  Described as a dull ache that last for minutes to hours at a time.   Symptoms are mostly noticed upon waking up.  Admit relief of symptoms after defecation.     Patient denies any episodes of RUQ abdominal pain this year.  Symptoms were described as a dull aching to crampy and sharp pain.  Pain was initially intermittently, then  began to  worsen first of May. Patient notes as symptoms progressively worsen she went to [facility] ER 5/3/2016 and at that time her pain was severe, level 8 /10.  The Location of pain at the time she was seen in ER was RUQ abdomen and radiated to right side of her back, and middle of the back. Pain was worse with inhalation.  She had CT Abdomen, Chest Xray and was treated with IV fluids, Morphine with great relief of symptoms. Patient was discharged with [place] 5/325 #12 and to follow up with GI, and PCP.  She denies any abdominal pain since the day after she discharged from [facility] ER.  Patient denies the use of antibiotics within the last (6) months. 	    Outside Labs:  (5/3/2016) Glucose 105 (H), Creatinine 1.16 (H), Protein Total 8.5 (H), ALT 36 (N), AST 20 (N), Albumin 4.5 (N), Total Bilirubin 0.6 (N), Total Calcium 10.6 (H), Alkaline Phosphatase 65 (N), Lipase 391 (N), WBC 7.2 (N), RBC 4.98 (N), Hemoglobin 15.4 (N), Hematocrit 44.4 (H), Platelet 345 (N).    Outside Imaging: (5/3/2016) CT Pelvis with impression: No acute intra-abdominal process. Colonic diverticulosis without diverticulitis. Normal appendix. No bowel obstruction. Persistent prominent appearance of the pancreatic duct measuring up to 3.9 mm in maximal diameter. Fatty liver with hepatomegaly.  (05/15/2015) CT Abdomen and Pelvis with contrast impression: Normal appendix. Descending and sigmoid colon diverticula. No diverticulitis. Borderline pancreatic ductal dilation. No biliary ductal dilation. Further evaluation with MRI of the abdomen and MRCP may be obtained.;   Gas : Denies any abnormal amounts of gas since her last visit.    Last visit (11/24/2020) Patient states she has experienced some gas throughout the day. Admits she has been able to pass the gas. Denies any OTC medications for relief.;   Fecal incontinence : She admits 1-2 episodes of fecal incontinence during treatment of Cipro and Flagyl for Diverticulitis.  Last occurred week ago.   Last visit (11/24/2020) She denies any episodes of fecal incontinence since her last visit.    Last visit (9/15/2020) She report having normal bowel habits over the past month without episodes of fecal incontinence or associated fecal urgency.   Last visit (7/28/2020) Admit random episodes of fecal incontinence with associated fecal urgency.   Last visit (7/22/2020) Continue to experience Fecal incontinence episodes daily.   Symptoms are associated with diarrhea episodes, at least 5-6 times a day.  Last visit (06/24/2020) Continue to experience Fecal incontinence episodes daily associated with diarrhea episodes, 5+ times a day..  She has been wearing under pads due to symptoms.   Last visit (5/11/2020) Admit having fecal incontinence while sitting last week. She has a Cystocele/Rectocele since 2016 and report having Vaginal Pessary Inserted in (2019) performed by Dr. Edie Holden.   Last visit (3/31/2020) Continue to experience Fecal incontinence episodes daily associated with diarrhea episodes.  She has been wearing under pads due to symptoms.    Of note, patient contact the office on (3/13/2020) with c/o 5-6 loose and watery BM's per day and episodes of fecal incontinence since 2 days after her last visit.  Described as full fecal seepage that occur with bending over or walking.      Last visit (3/3/2020) Patient denies episodes of Fecal incontinence since last office visit.  Last visit (1/15/2019) Patient denies experiencing any fecal incontinence episodes since her last office visit.    10/16/18 Patient states that she has had improvement of symptoms. She has had less episodes since having a pessary device inserted.  09/18/2018 Patient continues to have episodes of fecal incontinence daily.   08/22/2018 She continue to experience fecal incontinence. Symptoms occur 1-2 times per day.  She continue to ware a pad in her under garment, because she does not feel the leakage.    Last visit (7/18/2018)Patient continues to have fecal incontinence.   Last visit (6/26/2018) Patient continues to have fecal incontinence. She states that she has seen Dr. Butt since her last visit. Of note, this was prior to her having diarrhea. At the time of her consultation he diagnosed her with cystocele and rectocele. In addition, they discussed her having a device placed in her back to help control her sphincter.   04/24/2018 Continues to have fecal incontinence and will be following up with Dr. ATUL Butt Continue to experience Fecal incontinence episodes daily associated with diarrhea episodes.  she has been wearing under pads due to symptoms.    Of note, patient contact the office on (3/13/2020) with c/o 5-6 loose and watery BM's per day and episodes of fecal incontinence since 2 days after her last visit.  Described as full fecal seepage that occur with bending over or walking.      Last visit (3/3/2020) Patient denies episodes of Fecal incontinence since last office visit.  Last visit (1/15/2019) Patient denies experiencing any fecal incontinence episodes since her last office visit.    10/16/18 Patient states that she has had improvement of symptoms. She has had less episodes since having a pessary device inserted.  09/18/2018 Patient continues to have episodes of fecal incontinence daily.   08/22/2018 She continue to experience fecal incontinence. Symptoms occur 1-2 times per day.  She continue to ware a pad in her under garment, because she does not feel the leakage.    Last visit (7/18/2018)Patient continues to have fecal incontinence.   Last visit (6/26/2018) Patient continues to have fecal incontinence. She states that she has seen Dr. Butt since her last visit. Of note, this was prior to her having diarrhea. At the time of her consultation he diagnosed her with cystocele and rectocele. In addition, they discussed her having a device placed in her back to help control her sphincter.   04/24/2018 Continues to have fecal incontinence and will be following up with Dr. ATUL Butt ;   Abnormal MRI : Patient recently had a CT completed revealing Diverticulitis. She has been treated with Cipro 500mg, Flagyl 500mg, and Dificid (prophylactically) x 10 days.    Last visit (1/15/2019) Patient admits that she has had a heart test completed as well as a MRI of her neck completed in December and she has not yet received the results.  10/16/2018 Deny any recent imaging completed.  09/18/2018 No recent imaging completed.  04/24/2018 Patient presents today for a follow up of her pancreas  divisum which was diagnosed via MRI Abdomen May 29, 2015.  She notes of having low back pain in May 2015 and was seen by her PCP who subsequently ordered an MRI Abdomen w/wo contrast.  (5/29/2015) MR Abdomen w/wo contrast impression: Pancreas divisum with separate drainage of the pancreatic duct and common bile duct ar the duodenum. Mild, diffuse pancreatic ductal dilation without obstructing mass or calculus.    Last visit (1/15/2019) Patient admits that she has had a heart test completed as well as a MRI of her neck completed in December and she has not yet received the results.  10/16/18 No recent imaging completed.  09/18/2018 No recent imaging completed.  04/24/2018 Patient presents today for a follow up of her pancreas  divisum which was diagnosed via MRI Abdomen May 29, 2015.  She notes of having low back pain in May 2015 and was seen by her PCP who subsequently ordered an MRI Abdomen w/wo contrast.  (5/29/2015) MR Abdomen w/wo contrast impression: Pancreas divisum with separate drainage of the pancreatic duct and common bile duct ar the duodenum. Mild, diffuse pancreatic ductal dilation without obstructing mass or calculus. ;   Dysphagia : Denies continued episodes of dysphagia since her last visit.     Last visit (11/24/2020) Patient denies any dysphagia since her last visit.   She denies any episodes dysphagia since her last visit. She does admits that she feels gas at this time and reports that she flatulants often and is unable to control symptoms.    Last visit (9/15/2020) Denies globus or dysphagia.   Last visit (7/28/2020) She has a globus sensation that occur daily.  Denies dysphagia.    Last visit (7/22/2020)  She has a globus sensation since the past three days with regurgitation .   Patient can not recall starting. in her words ( It doesn't matter now ) Famotidine 20 mg po QHS 30-1    She continue to deny episodes of dysphagia Last visit (5/11/2020) She continue to deny episodes of dysphagia  Last visit (3/31/2020) She continue to deny episodes of dyspragia.  Last visit (3/3/2020) Patient denies episodes of dyspragia since last office visit.  Last visit (1/15/2019) Patient denies any episodes of dysphagia since her EGD with dilation was completed.   10/16/18 Patient continues to deny episodes of dysphagia.   09/18/2018 Patient continues to deny episodes of dysphagia.    08/22/2018 Continue to deny dysphagia since EGD w/dilation.    Last visit (7/18/2018) Patient denies recent episodes of dysphagia.  Last visit (6/26/2018) Patient denies recent episodes of dysphagia.  04/24/2018 Continues to have dysphagia with solid food , upper esophagus , few times a week    At last visit (10/31/2017)Difficulty in swallowing since the past year, few times a month associated with dryness of mouth due to Sjogren's She continue to deny episodes of dyspragia.  Last visit (3/3/2020) Patient denies episodes of dyspragia since last office visit.  Last visit (1/15/2019) Patient denies any episodes of dysphagia since her EGD with dilation was completed.   10/16/18 Patient continues to deny episodes of dysphagia.   09/18/2018 Patient continues to deny episodes of dysphagia.    08/22/2018 Continue to deny dysphagia since EGD w/dilation.    Last visit (7/18/2018) Patient denies recent episodes of dysphagia.  Last visit (6/26/2018) Patient denies recent episodes of dysphagia.  04/24/2018 Continues to have dysphagia with solid food , upper esophagus , few times a week    At last visit (10/31/2017)Difficulty in swallowing since the past year, few times a month associated with dryness of mouth due to Sjogren's    ;   Fatty Liver : She continue to take Vitamin E 400 mg BID.    Last visit (11/24/2020) She continue to deny  jaundice, chills, RUQ pains, dizziness, or light headedness.   Last visit: (11/3/20) Patient currently denies  jaundice, chills, RUQ pains, dizziness, or easy bruising.   Last visit (9/15/2020) She denies jaundice, chills, RUQ pains, dizziness, or light headedness.  Admit increased fatigue over the past month and was experiencing feeling "lethargic".  She saw her Internist last week and report having an extensive amount of blood work completed, which she is awaiting results.    Last visit (7/22/2020) She continue to deny  jaundice, chills, RUQ pains, dizziness, or light headedness   Last visit (3/31/2020) She continue to deny  jaundice, chills, RUQ pains, dizziness, or light headedness. Admit still feeling fatigue she is unsure if this has to do with her Liver or her Sjogren's Disease.   Last visit (3/3/2020) Patient denies jaundice, chills, RUQ pains, dizziness, or light headedness. Admits still feeling fatigue she is unsure if this has to do with her Liver or her Sjogren's Disease.  Last visit (01/15/2019) Patient currently denies jaundice, chills, RUQ pains, dizziness, or light headedness. Admits still feeling fatigue.    10/16/18 Patient was diagnosed with fatty liver in 2017.  She currently denies jaundice, chills, RUQ pains, dizziness, or light headedness Admit easy bruising over past few months, which is being follow by Hematologist Dr. Hakeem Fallon.  Admit several year history of feeling fatigue.    09/18/2018 Patient was diagnosed with fatty liver in 2017.  She currently denies jaundice, chills, RUQ pains, dizziness, or light headedness Admit easy bruising over past few months, which is being follow by Hematologist Dr. Hakeem Fallon.  Admit several year history of feeling fatigue.   08/22/2018 Patient was diagnosed with a fatty liver in 2017. Most recent labs with PCP Dr. Michelle Naidu a month ago due to skin rash from an allergic reaction from an unknown source. Labs completed (7/12/2018) revealed CBC and CMP within normal limits.   She currently denies jaundice, chills, RUQ pains, dizziness, or light headedness Admit easy bruising over past few months, which is being follow by Hematologist Dr. Hakeem Fallon.  Admit several year history of feeling fatigue.    04/24/2018 Patient was found to have fatty liver via CT abdomen on May 3, 2017.  Most recent labs with PCP Dr. Michelle Naidu  Aug. 23, 2017 at which time patient states her liver enzymes were fine, just had elevated calcium levels.  Patient currently denies jaundice, chills, RUQ pains, dizziness, or light headedness Admit easy bruising over past few months, which is being follow by Hematologist Dr. Hakeem Fallon.  Admit several year history of feeling fatigue.   Outside Labs:  (5/3/2016) Glucose 105 (H), Creatinine 1.16 (H), Protein Total 8.5 (H), ALT 36 (N), AST 20 (WNL), Albumin 4.5 (N), Total Bilirubin 0.6 (N), Total Calcium 10.6 (H), Alkaline Phosphatase 65 (N), Lipase 391 (N), WBC 7.2 (N), RBC 4.98 (N), Hemoglobin 15.4 (N), Hematocrit 44.4 (H), Platelet 345 (N).  (10/31/2017)NAFLD Score: -0.979   &lt; -1.455: predictor of absence of significant fibrosis (F0-F2 fibrosis) ? -1.455 to ? 0.675: indeterminate score > 0.675: predictor of presence of significant fibrosis (F3-F4 fibrosis)     Outside Imaging: (5/3/2016) CT Pelvis with impression: No acute intra-abdominal process. Colonic diverticulosis without diverticulitis. Normal appendix. No bowel obstruction. Persistent prominent appearance of the pancreatic duct measuring up to 3.9 mm in maximal diameter. Fatty liver with hepatomegaly.  (05/15/2015) CT Abdomen and Pelvis with contrast impression: Normal appendix. Descending and sigmoid colon diverticula. No diverticulitis. Borderline pancreatic ductal dilation. No biliary ductal dilation. Further evaluation with MRI of the abdomen and MRCP may be obtained. She continue to deny  jaundice, chills, RUQ pains, dizziness, or light headedness. Admit still feeling fatigue she is unsure if this has to do with her Liver or her Sjrogen's Disease.   Last visit (3/3/2020) Patient denies jaundice, chills, RUQ pains, dizziness, or light headedness. Admits still feeling fatigue she is unsure if this has to do with her Liver or her Sjrogen's Disease.  Last visit (01/15/2019) Patient currently denies jaundice, chills, RUQ pains, dizziness, or light headedness. Admits still feeling fatigue.    10/16/18 Patient was diagnosed with fatty liver in 2017.  She currently denies jaundice, chills, RUQ pains, dizziness, or light headedness Admit easy bruising over past few months, which is being follow by Hematologist Dr. Hakeem Fallon.  Admit several year history of feeling fatigue.    09/18/2018 Patient was diagnosed with fatty liver in 2017.  She currently denies jaundice, chills, RUQ pains, dizziness, or light headedness Admit easy bruising over past few months, which is being follow by Hematologist Dr. Hakeem Fallon.  Admit several year history of feeling fatigue.   08/22/2018 Patient was diagnosed with a fatty liver in 2017. Most recent labs with PCP Dr. Michelle Naidu a month ago due to skin rash from an allergic reaction from an unknown source. Labs completed (7/12/2018) revealed CBC and CMP within normal limits.   She currently denies jaundice, chills, RUQ pains, dizziness, or light headedness Admit easy bruising over past few months, which is being follow by Hematologist Dr. Hakeem Fallon.  Admit several year history of feeling fatigue.    04/24/2018 Patient was found to have fatty liver via CT abdomen on May 3, 2017.  Most recent labs with PCP Dr. Michelle Naidu  Aug. 23, 2017 at which time patient states her liver enzymes were fine, just had elevated calcium levels.  Patient currently denies jaundice, chills, RUQ pains, dizziness, or light headedness Admit easy bruising over past few months, which is being follow by Hematologist Dr. Hakeem Fallon.  Admit several year history of feeling fatigue.   Outside Labs:  (5/3/2016) Glucose 105 (H), Creatinine 1.16 (H), Protein Total 8.5 (H), ALT 36 (N), AST 20 (WNL), Albumin 4.5 (N), Total Bilirubin 0.6 (N), Total Calcium 10.6 (H), Alkaline Phosphatase 65 (N), Lipase 391 (N), WBC 7.2 (N), RBC 4.98 (N), Hemoglobin 15.4 (N), Hematocrit 44.4 (H), Platelet 345 (N).  (10/31/2017)NAFLD Score: -0.979   &lt; -1.455: predictor of absence of significant fibrosis (F0-F2 fibrosis) ? -1.455 to ? 0.675: indeterminate score > 0.675: predictor of presence of significant fibrosis (F3-F4 fibrosis)     Outside Imaging: (5/3/2016) CT Pelvis with impression: No acute intra-abdominal process. Colonic diverticulosis without diverticulitis. Normal appendix. No bowel obstruction. Persistent prominent appearance of the pancreatic duct measuring up to 3.9 mm in maximal diameter. Fatty liver with hepatomegaly.  (05/15/2015) CT Abdomen and Pelvis with contrast impression: Normal appendix. Descending and sigmoid colon diverticula. No diverticulitis. Borderline pancreatic ductal dilation. No biliary ductal dilation. Further evaluation with MRI of the abdomen and MRCP may be obtained. ;   Diarrhea : Patient presents today for a follow up of bowel habits.  She admit intermittent  episodes of diarrhea during treatment for Diverticulitis, which she was found to have via CT Abdomen at [facility] on (12/31/2020) ordered by Urogynecologist Dr. Matti Gutierrez following c/o severe LLQ abdominal pain.     She was treated with Cipro 500 mg BID and Flagyl 500 mg TID x 10 days. Dr. Gallagher prescribed Dificid 200 mg 1 BID prophylactically for 10 days.    Patient reports the use of Imodium 1 QD during the episodes of diarrhea with improvement of symptoms. Last dose 1-2 weeks ago.  She currently admits 3 semi-formed bowel movements per day. Denies melena, blood or mucus in stools.   Last visit (12/2/2020) Patient denies any episodes of diarrhea.    Last visit: (11/3/20) She denies any episodes of diarrhea. Patient currently admits 1-2 bowel movements a day with no strain. Her stools are formed without blood, mucus, melena, pruritus ani, or rectal pain.    Last visit (9/15/2020) Patient presents today for a follow up of diarrhea.  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  Treatment consisted of Dificid and Zinplava ( Infusion ), which she completed on (8/18/2020) with marked improvement of symptoms.  She report having normal bowel habits over the past month.  Currently admit 2-4 normal and formed bowel movements per day. Denies melena, blood or mucus in stools.     Patient state she had a follow up with Uro-Gynecologist Dr. Holden suggest she had a 2nd opinion with another GI.  Also she did a rectal exam and was told her sphincter muscle was a 5/10.  She is currently taking Dicyclomine HCl 10 MG and Welchol 3 BID.  She report having yellowish orange stool when she have fecal incontinence, which occur randomly.  Last episodes occurred 5 days ago. Denies melena, blood or mucus in stools.      Last visit (7/28/2020) Currently admit 3 normal and formed evacuations and 1 episodes of watery bowel movements.  Admit associated episodes of fecal incontinence.  She continue to take Continue Welchol 625 MG, 3 BID.  She did not submit the stool study as ordered by Dr. Gallagher yesterday.   Last visit (7/22/2020) Patient admits to 5-6 bowel episodes of diarrhea a day.   Of note, patient contact the office on (7/6/2020) stating she completed the Dificid last week and continue to take Welchol 625 MG She denies diarrhea episodes over the past 2 weeks.  Stools are small semi-formed.  Denies straining. She want to discuss fecal transplant via pill form and Interstim.        Last visit (06/24/2020)  Patient presents today for a follow up of diarrhea, review MRI, and QDx results.  Patient was notified on (6/3/2020) QDx stool study was positive for C. Diff, subsequently she was treated with Vancomycin 125 mg po QID for 10 days.  Patient contact the office on (6/15/2020) c/o symptoms are no better. Also continue to experience abdominal pain and fecal incontinence 5+ times a day.    She also want to know if the cholestyramine come in a tablet form, because it say it can damage teeth, which she already has risk of dental damage from her Sjogren's.   Subsequently, patient was treated with Dificid 200 mg po BID for 10 days. Welchol 625 mg III po BID and Imodium - 1-2,  4 times a day prn.  She has not taken Imodium.   She admit bowel movements were getting back to normal last week over the course of 3 days while on a bland diet with having 3 normal formed BM's per day.  While off the diet she would have 3-5 BM's per day with a couple episodes fecal incontinence.  Denies melena, blood or mucus in stools.     Last visit (5/11/2020) Admit 5-6 loose BM's per day with fecal seepage and LLQ abdominal pain. Symptoms were improved then began to increase several weeks ago.  Denies melena, blood or mucus in stools.  Denies pruritus ani, rectal pain or bleeding.    She report maybe 1-2 days a week she will have normal and formed bowel movements.     Last visit (3/31/2020) Admit 5-6 bowel movements per day with 2-3 episodes of diarrhea at least once a day since last office visit.  Denies melena, blood or mucus in stools.  Of note, patient contact the office on (3/13/2020) with c/o 5-6 loose and watery BM's per day and episodes of fecal incontinence since 2 days after her last visit.  Described as full fecal seepage that occur with bending over or walking.    Subsequently a stool study was ordered, treated with Flagyl 500 mg TID, Bactrim DS BID x 10 days, take Imodium prn with caution.  She completed the 10 day course of Flagyl 500 mg TID, Bactrim DS BID on (3/22/2020).   Last visit (3/3/2020)She reports 2-3 bowel movements a day with no strain. She admits her stools are formed with no bleeding, melena, or mucus at this time.   Last visit (1/15/2019) Patient presents today for a follow up of diarrhea. Patient admits rarely experiencing diarrhea since her last office visit. Patient admits 1-3 bowel movements a day. Stools are normal in form.   Patient admits that she is scheduled to see Dr Holden tomorrow.       Patient denies the use of Welchol Tablet, 625 MG, 2 tablets prn with meals, Orally, TID. Patient states that she wanted to clear her body from the medications that she was taking.   Patient denies the use of Dicyclomine HCl Capsule, 10 MG, 1 tablet prn, Orally, TID. Patient states that she felt that this medication wasn't helping her so she stopped taking it.    10/16/18 Patient presents today for a follow up of diarrhea. She states there has been an improvement of symptoms and she admits to having 2-3 bowel movements per day. Ranging from solid to loose stools.   Patient was evaluated by Dr. Holden a Urogynecologist at [facility] who administered a pessary, which is a prosthetic device that can be inserted into the vagina to support its internal structure. She was advised this device tends to improve diarrhea and fecal incontinence in patients that have rectocele. Patient also has started Pelvic Floor Therapy and has completed two sessions.   Of note, patient never started Viberzi Tablet, 75 MG.   09/18/2018 Patient presents today for a follow up of diarrhea. She continues to have episodes of watery/loose stools daily. She states she can have up to 5-6 bowel movements per day. She denies blood, mucous or melena.    08/22/2018 Patient presents today to review QDx stool study, lab results and follow up of diarrhea. She has been taking Welchol 625 MG, 2 BID with some improvement in bowel habits.  Currently admit 2-3 defecations per day.  Stools are 50% of the time normal formed and loose/watery.  Denies melena, blood or mucus in stools.  Denies fecal urgency.  Continue to admit episodes of fecal incontinence.      Last visit (7/18/2018) Patient presents today for a follow up after her colonoscopy, EGD and labs.  She denies any complications after her procedure. She continues to have 3-4 bowel movements per day ranging from solid to watery/loose. She denies mucous or melena. She does admit to occasional bright red blood present on toilet paper following a bowel movement. She denies taking  Imodium A-D or Hyoscyamine.  Last visit (6/26/2018) Patient presents today for a consultation of diarrhea since the past 6 months . She states that symptoms started around June 9 th, 2018 after eating dinner. She was evaluated by [facility] on Hannah 10, 2018 for her symptoms. At that time stool studies were completed. She is currently having approximately five watery/loose stools per day. She denies any blood, mucous or melena. However, patient admits the amount of stools may increase if she consumes more food. She has been avoiding eating because that triggers her to have a bowel movement. She has had an eight pound unintentional weight loss since her last visit on 04/24/2018.    She did have an MRI of the abdomen completed on 04/06/2018 which revealed subtle sludge or concentrated bile within the gallbladder.  Per patient her last colonoscopy was 2-3 years ago. She denies having colon polyps at that time.  Of, note patient was prescribed Keflex on May 20, 2018. Admit 5-6 bowel movements per day with 2-3 episodes of diarrhea at least once a day since last office visit.  Denies melena, blood or mucus in stools.  Of note, patient contact the office on (3/13/2020) with c/o 5-6 loose and watery BM's per day and episodes of fecal incontinence since 2 days after her last visit.  Described as full fecal seepage that occur with bending over or walking.    Subsequently a stool study was ordered, treated with Flagyl 500 mg TID, Bactrim DS BID x 10 days, take Imodium prn with caution.  She completed the 10 day course of Flagyl 500 mg TID, Bactrim DS BID on (3/22/2020).   Last visit (3/3/2020)She reports 2-3 bowel movements a day with no strain. She admits her stools are formed with no bleeding, melena, or mucus at this time.   Last visit (1/15/2019) Patient presents today for a follow up of diarrhea. Patient admits rarely experiencing diarrhea since her last office visit. Patient admits 1-3 bowel movements a day. Stools are normal in form.   Patient admits that she is scheduled to see Dr Holden tomorrow.       Patient denies the use of Welchol Tablet, 625 MG, 2 tablets prn with meals, Orally, TID. Patient states that she wanted to clear her body from the medications that she was taking.   Patient denies the use of Dicyclomine HCl Capsule, 10 MG, 1 tablet prn, Orally, TID. Patient states that she felt that this medication wasn't helping her so she stopped taking it.    10/16/18 Patient presents today for a follow up of diarrhea. She states there has been an improvement of symptoms and she admits to having 2-3 bowel movements per day. Ranging from solid to loose stools.   Patient was evaluated by Dr. Holden a Urogynecologist at [facility] who administered a pessary, which is a prosthetic device that can be inserted into the vagina to support its internal structure. She was advised this device tends to improve diarrhea and fecal incontinence in patients that have rectocele. Patient also has started Pelvic Floor Therapy and has completed two sessions.   Of note, patient never started Viberzi Tablet, 75 MG.   09/18/2018 Patient presents today for a follow up of diarrhea. She continues to have episodes of watery/loose stools daily. She states she can have up to 5-6 bowel movements per day. She denies blood, mucous or melena.    08/22/2018 Patient presents today to review QDx stool study, lab results and follow up of diarrhea. She has been taking Welchol 625 MG, 2 BID with some improvement in bowel habits.  Currently admit 2-3 defecations per day.  Stools are 50% of the time normal formed and loose/watery.  Denies melena, blood or mucus in stools.  Denies fecal urgency.  Continue to admit episodes of fecal incontinence.      Last visit (7/18/2018) Patient presents today for a follow up after her colonoscopy, EGD and labs.  She denies any complications after her procedure. She continues to have 3-4 bowel movements per day ranging from solid to watery/loose. She denies mucous or melena. She does admit to occasional bright red blood present on toilet paper following a bowel movement. She denies taking  Imodium A-D or Hyoscyamine.  Last visit (6/26/2018) Patient presents today for a consultation of diarrhea since the past 6 months . She states that symptoms started around June 9 th, 2018 after eating dinner. She was evaluated by [facility] on Hannah 10, 2018 for her symptoms. At that time stool studies were completed. She is currently having approximately five watery/loose stools per day. She denies any blood, mucous or melena. However, patient admits the amount of stools may increase if she consumes more food. She has been avoiding eating because that triggers her to have a bowel movement. She has had an eight pound unintentional weight loss since her last visit on 04/24/2018.    She did have an MRI of the abdomen completed on 04/06/2018 which revealed subtle sludge or concentrated bile within the gallbladder.  Per patient her last colonoscopy was 2-3 years ago. She denies having colon polyps at that time.  Of, note patient was prescribed Keflex on May 20, 2018.;   Elevated Liver Enzymes : CBC is the most recent labs completed on (12/11/2020) at [facility].   Currently denies  jaundice, chills, RUQ pains, dizziness, or easy bruising.  Admits to having ocassional episodes of gas.    Last visit (11/24/2020) 61 Y/O female presents today to review labs and follow up for elevated liver enzymes.  Labs completed on (11/10/2020) CMP: ALT (High) 63. Hepatic Function Panel: ALT (High) 69.   Currently denies  jaundice, chills, RUQ pains, dizziness, or easy bruising.  Admits to having ocassional episodes of gas.   Last visit: (11/03/20) Patient presents today for a consultation of elevated liver enzymes, which were found via labs with PCP, CMP: AST (High) 75 and ALT (High) 154  Patient denies a history of elevated liver enzymes.  Patient currently denies  jaundice, chills, RUQ pains, dizziness, or easy bruising. She admits fatigue but associate symptoms with Sjogren's Disease.   RISK FACTORS: Denies Admits Alcohol, drugs, travel outside the [place], NSAID's, protein supplements, tattoos, piercing's,  service, blood transfusion, family history of liver disease or cancer, personal exposure to liver diseases, snf, rehab   Versepa 1 mg 2 tabs in AM and 2 tabs in PM Mybetriq 50 mg once at bedtime;   Hospital Follow Up : Admits/denies any ER visits or hospital stays since her last visit.     Last visit (11/24/2020) Patient denies any recent hospitalization since last visit.   Last visit (9/15/2020)Denies any hospitalization since last visit.   Last visit (7/28/2020) Patient presented to [facility] on (7/23/2020) at the advise of our office due to uncontrolled severe abdominal pain.    CT Abd/Pelvis with contrast was performed revealing No findings to account for abdominal pain   Patient was discharged (7/23/2020) after labs, ct reviewed. Dr. Shah, radiology consulted advising mesenteric vessels unremarkable. Based on results, pt will be d/c with outpt follow up to ob/gyn and GI, Dr Ivory.     Last visit (7/22/2020)  Denies any hospitalization since last visit.  Last visit (3/31/2020) Denies any hospitalization since last visit.   Patient presented to [facility] ED June 22, 2019 with abdominal pain.  Onset 5 days prior to ER.  Described as a constant pain located right abdomen and flank area.  Denies any aggravating factors. Denies any hospitalization since.     Patient presented to [facility] ED June 22, 2019 with abdominal pain.  Onset 5 days prior to ER.  Described as a constant pain located right abdomen and flank area.  Denies any aggravating factors.;

## 2021-01-21 ENCOUNTER — TELEPHONE ENCOUNTER (OUTPATIENT)
Dept: URBAN - METROPOLITAN AREA CLINIC 35 | Facility: CLINIC | Age: 61
End: 2021-01-21

## 2021-01-21 RX ORDER — FAMOTIDINE 20 MG/1
1 TABLET AT BEDTIME AS NEEDED TABLET, FILM COATED ORAL ONCE A DAY
Qty: 14 TABLET | Refills: 0 | OUTPATIENT
Start: 2021-01-21

## 2021-01-21 RX ORDER — DICYCLOMINE HYDROCHLORIDE 10 MG/1
1 CAPSULES CAPSULE ORAL THREE TIMES A DAY
Qty: 30 CAPSULE | Refills: 0 | OUTPATIENT
Start: 2021-01-21

## 2021-03-09 ENCOUNTER — OFFICE VISIT (OUTPATIENT)
Dept: URBAN - METROPOLITAN AREA CLINIC 35 | Facility: CLINIC | Age: 61
End: 2021-03-09

## 2021-03-09 VITALS
HEART RATE: 103 BPM | BODY MASS INDEX: 25.27 KG/M2 | DIASTOLIC BLOOD PRESSURE: 86 MMHG | SYSTOLIC BLOOD PRESSURE: 119 MMHG | TEMPERATURE: 99.2 F | OXYGEN SATURATION: 99 % | WEIGHT: 148 LBS | HEIGHT: 64 IN

## 2021-03-09 RX ORDER — LEVOMEFOLATE/ALGAL OIL 15-90.314
1 CAPSULE CAPSULE ORAL ONCE A DAY
Status: ACTIVE | COMMUNITY

## 2021-03-09 RX ORDER — GABAPENTIN 100 MG/1
1 CAPSULE CAPSULE ORAL ONCE A DAY
Status: ON HOLD | COMMUNITY

## 2021-03-09 RX ORDER — ELUXADOLINE 75 MG/1
1 TABLET WITH FOOD TABLET, FILM COATED ORAL TWICE A DAY
Status: ON HOLD | COMMUNITY
Start: 2018-09-18

## 2021-03-09 RX ORDER — CELECOXIB 100 MG/1
1 CAPSULE WITH FOOD CAPSULE ORAL TWICE A DAY
Qty: 28 CAPSULE | Refills: 0 | Status: ON HOLD | COMMUNITY
Start: 2020-07-24

## 2021-03-09 RX ORDER — NEBIVOLOL HYDROCHLORIDE 5 MG/1
1 TABLET TABLET ORAL ONCE A DAY
Status: ACTIVE | COMMUNITY

## 2021-03-09 RX ORDER — CIPROFLOXACIN HCL 500 MG
1 TABLET TABLET ORAL
Qty: 20 | Refills: 0 | Status: ON HOLD | COMMUNITY
Start: 2020-03-12

## 2021-03-09 RX ORDER — VANCOMYCIN HYDROCHLORIDE 125 MG/1
1 CAPSULE CAPSULE ORAL
Qty: 40 | Refills: 0 | Status: ON HOLD | COMMUNITY
Start: 2020-06-03

## 2021-03-09 RX ORDER — FAMOTIDINE 20 MG/1
1 TABLET AT BEDTIME AS NEEDED TABLET, FILM COATED ORAL ONCE A DAY
Qty: 14 TABLET | Refills: 0 | Status: DISCONTINUED | COMMUNITY
Start: 2021-01-21

## 2021-03-09 RX ORDER — SACCHAROMYCES BOULARDII 50 MG
1 CAPSULE CAPSULE ORAL TWICE A DAY
OUTPATIENT

## 2021-03-09 RX ORDER — SACCHAROMYCES BOULARDII 50 MG
1 CAPSULE CAPSULE ORAL TWICE A DAY
Qty: 60 | Status: ACTIVE | COMMUNITY
Start: 2021-01-12

## 2021-03-09 RX ORDER — MIRABEGRON 50 MG/1
1 TABLET TABLET, FILM COATED, EXTENDED RELEASE ORAL ONCE A DAY
Qty: 30 | Status: ACTIVE | COMMUNITY

## 2021-03-09 RX ORDER — ALPRAZOLAM 2 MG/1
1 TABLET TABLET, EXTENDED RELEASE ORAL TWICE A DAY
Status: ACTIVE | COMMUNITY

## 2021-03-09 RX ORDER — ICOSAPENT ETHYL 1000 MG/1
2 CAPSULES WITH MEALS CAPSULE ORAL TWICE A DAY
Qty: 120 | Status: ACTIVE | COMMUNITY

## 2021-03-09 RX ORDER — MESALAMINE 1000 MG/1
1 SUPPOSITORY SUPPOSITORY RECTAL BID
Qty: 60 | Refills: 1 | Status: ON HOLD | COMMUNITY
Start: 2018-07-13

## 2021-03-09 RX ORDER — DICYCLOMINE HYDROCHLORIDE 10 MG/1
1 CAPSULES CAPSULE ORAL THREE TIMES A DAY
Qty: 30 CAPSULE | Refills: 0 | Status: DISCONTINUED | COMMUNITY
Start: 2021-01-21

## 2021-03-09 RX ORDER — SACCHAROMYCES BOULARDII 50 MG
1 CAPSULE CAPSULE ORAL TWICE A DAY
Qty: 60 | Status: ACTIVE | COMMUNITY

## 2021-03-09 RX ORDER — ESOMEPRAZOLE MAGNESIUM 40 MG/1
1 CAPSULE CAPSULE, DELAYED RELEASE ORAL ONCE A DAY
Status: ON HOLD | COMMUNITY

## 2021-03-09 RX ORDER — HYDROCORTISONE ACETATE 0.5 %
AS DIRECTED CREAM (GRAM) TOPICAL
Status: ACTIVE | COMMUNITY

## 2021-03-09 RX ORDER — METRONIDAZOLE 500 MG/1
1 TABLET TABLET, FILM COATED ORAL THREE TIMES A DAY
Qty: 30 | Refills: 0 | Status: ON HOLD | COMMUNITY
Start: 2020-03-12

## 2021-03-09 RX ORDER — NITROFURANTOIN MONOHYDRATE/MACROCRYSTALLINE 25; 75 MG/1; MG/1
1 CAPSULE AT BEDTIME WITH FOOD CAPSULE ORAL ONCE A DAY
Qty: 30 | Status: ON HOLD | COMMUNITY

## 2021-03-09 RX ORDER — SULFAMETHOXAZOLE AND TRIMETHOPRIM 800; 160 MG/1; MG/1
1 TABLET TABLET ORAL TWICE A DAY
Qty: 20 | Refills: 0 | Status: ON HOLD | COMMUNITY
Start: 2020-03-13

## 2021-03-09 RX ORDER — COLESEVELAM HYDROCHLORIDE 625 MG/1
3 TABLETS WITH MEALS TABLET, FILM COATED ORAL TWICE A DAY
Status: ON HOLD | COMMUNITY
Start: 2020-06-15

## 2021-03-09 RX ORDER — BUPROPION HYDROCHLORIDE 300 MG/1
1 TABLET IN THE MORNING TABLET, EXTENDED RELEASE ORAL ONCE A DAY
Status: ACTIVE | COMMUNITY

## 2021-03-09 RX ORDER — IBUPROFEN 800 MG/1
TAKE ONE TABLET BY MOUTH THREE TIMES A DAY TABLET, FILM COATED ORAL
Qty: 90 UNSPECIFIED | Refills: 0 | Status: DISCONTINUED | COMMUNITY

## 2021-03-09 RX ORDER — METRONIDAZOLE 500 MG/1
TAKE ONE TABLET BY MOUTH EVERY 8 HOURS FOR 10 DAYS TABLET, FILM COATED ORAL
Qty: 30 UNSPECIFIED | Refills: 0 | Status: ON HOLD | COMMUNITY

## 2021-03-09 RX ORDER — HYDROXYCHLOROQUINE SULFATE 200 MG/1
1 TABLET WITH FOOD OR MILK TABLET ORAL TWICE A DAY
Status: ON HOLD | COMMUNITY

## 2021-03-09 RX ORDER — B-COMPLEX WITH VITAMIN C
1 TABLET TABLET ORAL ONCE A DAY
Status: ACTIVE | COMMUNITY

## 2021-03-09 RX ORDER — FIDAXOMICIN 200 MG/1
1 TABLET TABLET, FILM COATED ORAL TWICE A DAY
Qty: 10 TABLET | Status: ON HOLD | COMMUNITY
Start: 2020-06-24

## 2021-03-09 RX ORDER — CHOLESTYRAMINE 4 G/9G
1 PACKET MIXED WITH WATER OR NON-CARBONATED DRINK POWDER, FOR SUSPENSION ORAL TWICE A DAY
Qty: 60 | Refills: 1 | Status: ON HOLD | COMMUNITY
Start: 2020-06-11

## 2021-03-09 RX ORDER — CIPROFLOXACIN 500 MG/1
TAKE ONE TABLET BY MOUTH EVERY 12 HOURS FOR 10 DAYS TABLET, FILM COATED ORAL
Qty: 20 UNSPECIFIED | Refills: 0 | Status: ON HOLD | COMMUNITY

## 2021-03-09 RX ORDER — VORTIOXETINE 20 MG/1
1 TABLET TABLET, FILM COATED ORAL ONCE A DAY
Status: ACTIVE | COMMUNITY

## 2021-03-09 RX ORDER — ACETAMINOPHEN 500 MG/1
TAKE TWO TABLETS BY MOUTH THREE TIMES A DAY AS NEEDED FOR PAIN TABLET ORAL
Qty: 180 UNSPECIFIED | Refills: 0 | Status: DISCONTINUED | COMMUNITY

## 2021-03-09 RX ORDER — MONTELUKAST SODIUM 10 MG/1
1 TABLET IN THE EVENING TABLET, FILM COATED ORAL ONCE A DAY
Status: ACTIVE | COMMUNITY

## 2021-03-09 RX ORDER — FIDAXOMICIN 200 MG/1
1 TABLET TABLET, FILM COATED ORAL TWICE A DAY
Qty: 20 | Refills: 0 | Status: ON HOLD | COMMUNITY
Start: 2020-06-15

## 2021-03-09 RX ORDER — RIZATRIPTAN BENZOATE 5 MG/1
1 TABLET ON THE TONGUE AND ALLOW TO DISSOLVE AS NEEDED ONE TIME TABLET, ORALLY DISINTEGRATING ORAL ONCE A DAY
Status: ON HOLD | COMMUNITY

## 2021-03-09 RX ORDER — HYOSCYAMINE SULFATE 0.125 MG
1 TABLET AS NEEDED TABLET ORAL
Qty: 60 TABLET | Refills: 1 | Status: ON HOLD | COMMUNITY
Start: 2018-06-27

## 2021-03-09 RX ORDER — PRASTERONE 6.5 MG/1
1 VAGINAL _INSERT AT BEDTIME INSERT VAGINAL ONCE A DAY
Qty: 30 | Status: ACTIVE | COMMUNITY

## 2021-03-09 RX ORDER — MULTIVIT-MIN/IRON/FOLIC ACID/K 18-600-40
AS DIRECTED CAPSULE ORAL
Status: ACTIVE | COMMUNITY

## 2021-03-09 RX ORDER — FENUGREEK SEED/BL.THISTLE/ANIS 340 MG
AS DIRECTED CAPSULE ORAL
Status: ACTIVE | COMMUNITY

## 2021-03-09 RX ORDER — PRAVASTATIN SODIUM 20 MG/1
1 TABLET TABLET ORAL ONCE A DAY
Status: ACTIVE | COMMUNITY

## 2021-03-09 RX ORDER — ASCORBIC ACID 125 MG
AS DIRECTED TABLET,CHEWABLE ORAL
Status: ACTIVE | COMMUNITY

## 2021-03-09 RX ORDER — TRAMADOL HYDROCHLORIDE 50 MG/1
TABLET, FILM COATED ORAL TWICE DAILY
Status: ON HOLD | COMMUNITY

## 2021-03-09 RX ORDER — SPIRONOLACTONE 25 MG/1
1 TABLET TABLET ORAL ONCE A DAY
Status: ACTIVE | COMMUNITY

## 2021-03-09 RX ORDER — HYDROXYCHLOROQUINE SULFATE 200 MG/1
1 TABLET WITH FOOD OR MILK TABLET ORAL BID
Status: ACTIVE | COMMUNITY

## 2021-03-09 RX ORDER — MAGNESIUM OXIDE/MAG AA CHELATE 300 MG
1 CAPSULE WITH A MEAL CAPSULE ORAL ONCE A DAY
Status: ACTIVE | COMMUNITY

## 2021-03-09 RX ORDER — ZOLPIDEM TARTRATE 5 MG/1
1 TABLET AT BEDTIME TABLET, FILM COATED ORAL ONCE A DAY
Status: ACTIVE | COMMUNITY
Start: 2021-01-12

## 2021-03-09 NOTE — HPI-MIGRATED HPI
;   ;   ;   ;   ;   ;   ;   ;   ;   ;     Abdominal Pain : Denies any episodes of abdominal pain since her last office visit.    Last visit (01/12/2021) Patient was diagnosed with Diverticulitis via CT Abdomen at [facility] on (12/31/2020) ordered by Urogynecologist Dr. Matti Gutierrez following c/o severe LLQ abdominal pain.  Subsequently she was treated with Flagyl 500 mg 1 TID and Cipro BID x 10 days.  Dr. Gallagher added prophylactic treatment with Dificid 200 mg 1 BID.  She completed the antibiotics yesterday.  She continue to admit intermittent LLQ abdominal pain, which she isn't sure if it's post op pain from her Laparoscopic supracervical hysterectomy and bilateral salpingectomy, and Laparoscopic sacrocervicopexy with Restorelle "Y"-shaped mesh performed on (12/14/2020).    Last visit (11/24/2020) Patient admits that her abdominal pain has resolved.    Last visit (9/15/2020) Abdominal pain episode after consuming pizza and banana pepper. Symptoms last for a few minutes then dissipate on it's own.   Last visit (7/28/2020) Patient presents today via televisit with consent for a follow up of abdominal pain.  Pain is located just below and periumbilical.  Described as a dull ache that occur daily in an intermittent pattern.  She will wake up with diffuse lower back pain and abdominal pain. Back pain gradually dissipate over the course of an hour,  but the abdominal pain continue to persist.  She report no clear aggravating factor, but occasionally will get relief with laying on her right side.   She is taking Dicyclomine 10 mg 1 TID without significant benefit.  She did not try the Celebrex after reading the warning insert.   She has an appt. with her Urogynecologist on Friday.   Of note, patient presented to [facility] on (7/23/2020) at the advise of our office due to uncontrolled severe abdominal pain.   Last visit (7/22/2020) Of note, patient contact the office on (7/6/2020) stating she completed the Dificid last week and continue to take Welchol 625 MG and hyoscyamine, but continue to have lower abdominal pain.  Pain is constant and described as a cramping pain. She report the hyoscyamine is not as effective.    Symptoms start upon waking up in the mornings and persist throughout the day. Patient state "something's got to give".  Subsequently she was advised to  Stop Hyoscyamine and start Dicyclomine 10 mg po QID prn   Patient reports Dicyclomine 10 mg po QID prn is not effective.    Last visit (06/24/2020)  Patient contact the office on (5/15/2020) c/o continue to experience abdominal pain and fecal incontinence 5+ times a day.   She admit lower abdominal pain below the umbilicus. Symptoms are present most morning upon waking up and will persist throughout the day.  She has been taking Hyoscyamine 2-3 times a day with temporary relief.       Last visit (5/15/2020) Patient presents today via televisit with consent for a follow up of increase LLQ abdominal pain.   Pain has been present constant and described as a dull ache. Denies any aggravating factors.   Admit some relief with the use of Hyoscyamine 1 BID.  She contact the office on (3/8/2020) c/o 5-6 loose BM's per day with fecal seepage and LLQ abdominal pain. Symptoms were improved then began to increase several weeks ago.  Denies melena, blood or mucus in stools. Denies pruritus ani, rectal pain or bleeding.   Abdominal pain is described as a dull ache that occur randomly throughout the day and will last for minutes to hours.  She ran out of the Hyoscyamine weeks ago.  Denies fever or chills.  Subsequently patient was informed to go to ER if LLQ pain is persistent and worse.      Last visit (3/31/2020) Patient presents today via tele visit to review CT, labs, stool study and follow up of LLQ abdominal pain associated with diverticulitis.  She admit improvement of symptoms with the use of Hyoscyamine and Ibuprofen 800 mg prn .  Last episode occurred yesterday.   Described as a sharp pain, that is typically present each day upon waking in the mornings.  She completed treatment for diverticulitis, Bactrim DS and Flagyl on (3/22/2020)   Of note, patient contact the office on (3/13/2020) with c/o 5-6 loose and watery BM's per day and episodes of fecal incontinence since 2 days after her last visit.  Described as full fecal seepage that occur with bending over or walking.   She state she has been treated with 3 antibiotics since Jan. 2020- Cefdnir x 10 days in Jan. 2020 for URI, then Cipro 500 mg 1 BID x 7 days on (2/3/2020), which she took for 3 days then told to d/c and start Moxifloxacin 100 mg x 10 days on (2/11/2020) due to the Diverticulitis.   Subsequently a stool study was ordered, treated with Flagyl 500 mg TID, Bactrim DS BID x 10 days, take Imodium prn with caution.  On (3/19/2020) patient continue to c/o LLQ abdominal pain upon waking up in the mornings. Pain is sharp in nature.  She will take Hyoscyamine, but it will take 1-2 hours to get relief.   Last visit (3/3/2020) Patient presents today for a follow up of abdominal pain. She denies any episodes of abdominal pain since her last office visit.   Patient had a CT Chest/ABD/Pelvis completed on 2/5/2020 showing mild diverticulitis of the proximal descending colon, with mild moderate inflammatory changes, including fat stranding and trace fluid. Moderate diffuse diverticulosis of the colon, more extensively involving the left colon. Groundglass infiltrate in the posterior right upper lobe, suspicious for possible pneumonia. Mild hepatic steatosis.   Patient admits that she was on abx and steroids due to a respiratory infection in December   Last visit (1/15/2019) Patient admits experiencing abdominal pain rarely since her last office visit.    10/16/18 Patient marks improvement of abdominal pain. She states that she has had 4-5 episodes since her last office visit 09/18/2018.  09/18/2018 Patient continues to have abdominal pain daily. She describes episodes as a cramping sensation throughout her lower abdomen. She typically begins to have symptoms after consuming a meal.  She is unsure if symptoms are improved with Dicyclomine 10 mg BID.   Of note, Patient called the office on 09/05/2018 complaining of abdominal cramping and  fecal incontinence. As a result we increased her Welchol 625mg from 2 tabs po bid to 2 tabs po tid.   08/22/2018 She admit improvement of symptoms with the use of Dicyclomine 10 mg BID.    Of note, patient contact the office (8/1/2018) stating the Hyoscyamine wasn't as effective, subsequently she was changed to Dicyclomine 10 mg po TID prn.  Last visit (7/18/2018) Patient continues to have abdominal discomfort. She admits the pain is infrequent and ranges from the left to right side of her abdomen. She continues to deny any aggravating factors.    Last visit (6/26/2018) Patient continues to have right sided abdominal pain accompanied with diarrhea daily. She describes episodes as a severe cramping sensation. She denies any aggravating factors.    04/24/2018 Continues to have right sided abdominal pain , intermittent severity , constant . No clear aggravating or alleviating factors     At last visit (10/31/2017) Admits abdominal pain that is located mid region and occur in an intermittent pattern.  Onset Dec. 2016 right after her hemorrhoidectomy and never got better.  Described as a dull ache that last for minutes to hours at a time.   Symptoms are mostly noticed upon waking up.  Admit relief of symptoms after defecation.     Patient denies any episodes of RUQ abdominal pain this year.  Symptoms were described as a dull aching to crampy and sharp pain.  Pain was initially intermittently, then  began to  worsen first of May. Patient notes as symptoms progressively worsen she went to [facility] ER 5/3/2016 and at that time her pain was severe, level 8 /10.  The Location of pain at the time she was seen in ER was RUQ abdomen and radiated to right side of her back, and middle of the back. Pain was worse with inhalation.  She had CT Abdomen, Chest Xray and was treated with IV fluids, Morphine with great relief of symptoms. Patient was discharged with [place] 5/325 #12 and to follow up with GI, and PCP.  She denies any abdominal pain since the day after she discharged from [facility] ER.  Patient denies the use of antibiotics within the last (6) months. 	    Outside Labs:  (5/3/2016) Glucose 105 (H), Creatinine 1.16 (H), Protein Total 8.5 (H), ALT 36 (N), AST 20 (N), Albumin 4.5 (N), Total Bilirubin 0.6 (N), Total Calcium 10.6 (H), Alkaline Phosphatase 65 (N), Lipase 391 (N), WBC 7.2 (N), RBC 4.98 (N), Hemoglobin 15.4 (N), Hematocrit 44.4 (H), Platelet 345 (N).    Outside Imaging: (5/3/2016) CT Pelvis with impression: No acute intra-abdominal process. Colonic diverticulosis without diverticulitis. Normal appendix. No bowel obstruction. Persistent prominent appearance of the pancreatic duct measuring up to 3.9 mm in maximal diameter. Fatty liver with hepatomegaly.  (05/15/2015) CT Abdomen and Pelvis with contrast impression: Normal appendix. Descending and sigmoid colon diverticula. No diverticulitis. Borderline pancreatic ductal dilation. No biliary ductal dilation. Further evaluation with MRI of the abdomen and MRCP may be obtained.;   Gas : Denies any episodes of flatulence or belching since her last office visit.    Last visit (01/12/2021) Denies any abnormal amounts of gas since her last visit.    Last visit (11/24/2020) Patient states she has experienced some gas throughout the day. Admits she has been able to pass the gas. Denies any OTC medications for relief.;   Fecal incontinence : Denies any fecal incontinence episodes.   Last visit (01/12/2021) She admits 1-2 episodes of fecal incontinence during treatment of Cipro and Flagyl for Diverticulitis.  Last occurred week ago.   Last visit (11/24/2020) She denies any episodes of fecal incontinence since her last visit.    Last visit (9/15/2020) She report having normal bowel habits over the past month without episodes of fecal incontinence or associated fecal urgency.   Last visit (7/28/2020) Admit random episodes of fecal incontinence with associated fecal urgency.   Last visit (7/22/2020) Continue to experience Fecal incontinence episodes daily.   Symptoms are associated with diarrhea episodes, at least 5-6 times a day.  Last visit (06/24/2020) Continue to experience Fecal incontinence episodes daily associated with diarrhea episodes, 5+ times a day..  She has been wearing under pads due to symptoms.   Last visit (5/11/2020) Admit having fecal incontinence while sitting last week. She has a Cystocele/Rectocele since 2016 and report having Vaginal Pessary Inserted in (2019) performed by Dr. Edie Holden.   Last visit (3/31/2020) Continue to experience Fecal incontinence episodes daily associated with diarrhea episodes.  She has been wearing under pads due to symptoms.    Of note, patient contact the office on (3/13/2020) with c/o 5-6 loose and watery BM's per day and episodes of fecal incontinence since 2 days after her last visit.  Described as full fecal seepage that occur with bending over or walking.      Last visit (3/3/2020) Patient denies episodes of Fecal incontinence since last office visit.  Last visit (1/15/2019) Patient denies experiencing any fecal incontinence episodes since her last office visit.    10/16/18 Patient states that she has had improvement of symptoms. She has had less episodes since having a pessary device inserted.  09/18/2018 Patient continues to have episodes of fecal incontinence daily.   08/22/2018 She continue to experience fecal incontinence. Symptoms occur 1-2 times per day.  She continue to ware a pad in her under garment, because she does not feel the leakage.    Last visit (7/18/2018)Patient continues to have fecal incontinence.   Last visit (6/26/2018) Patient continues to have fecal incontinence. She states that she has seen Dr. Butt since her last visit. Of note, this was prior to her having diarrhea. At the time of her consultation he diagnosed her with cystocele and rectocele. In addition, they discussed her having a device placed in her back to help control her sphincter.   04/24/2018 Continues to have fecal incontinence and will be following up with Dr. ATUL Butt Continue to experience Fecal incontinence episodes daily associated with diarrhea episodes.  she has been wearing under pads due to symptoms.    Of note, patient contact the office on (3/13/2020) with c/o 5-6 loose and watery BM's per day and episodes of fecal incontinence since 2 days after her last visit.  Described as full fecal seepage that occur with bending over or walking.      Last visit (3/3/2020) Patient denies episodes of Fecal incontinence since last office visit.  Last visit (1/15/2019) Patient denies experiencing any fecal incontinence episodes since her last office visit.    10/16/18 Patient states that she has had improvement of symptoms. She has had less episodes since having a pessary device inserted.  09/18/2018 Patient continues to have episodes of fecal incontinence daily.   08/22/2018 She continue to experience fecal incontinence. Symptoms occur 1-2 times per day.  She continue to ware a pad in her under garment, because she does not feel the leakage.    Last visit (7/18/2018)Patient continues to have fecal incontinence.   Last visit (6/26/2018) Patient continues to have fecal incontinence. She states that she has seen Dr. Butt since her last visit. Of note, this was prior to her having diarrhea. At the time of her consultation he diagnosed her with cystocele and rectocele. In addition, they discussed her having a device placed in her back to help control her sphincter.   04/24/2018 Continues to have fecal incontinence and will be following up with Dr. ATUL Butt ;   Abnormal MRI : Last visit (01/12/2021) Patient recently had a CT completed revealing Diverticulitis. She has been treated with Cipro 500mg, Flagyl 500mg, and Dificid (prophylactically) x 10 days.   Last visit (1/15/2019) Patient admits that she has had a heart test completed as well as a MRI of her neck completed in December and she has not yet received the results.  10/16/2018 Deny any recent imaging completed.  09/18/2018 No recent imaging completed.  04/24/2018 Patient presents today for a follow up of her pancreas  divisum which was diagnosed via MRI Abdomen May 29, 2015.  She notes of having low back pain in May 2015 and was seen by her PCP who subsequently ordered an MRI Abdomen w/wo contrast.  (5/29/2015) MR Abdomen w/wo contrast impression: Pancreas divisum with separate drainage of the pancreatic duct and common bile duct ar the duodenum. Mild, diffuse pancreatic ductal dilation without obstructing mass or calculus.    Last visit (1/15/2019) Patient admits that she has had a heart test completed as well as a MRI of her neck completed in December and she has not yet received the results.  10/16/18 No recent imaging completed.  09/18/2018 No recent imaging completed.  04/24/2018 Patient presents today for a follow up of her pancreas  divisum which was diagnosed via MRI Abdomen May 29, 2015.  She notes of having low back pain in May 2015 and was seen by her PCP who subsequently ordered an MRI Abdomen w/wo contrast.  (5/29/2015) MR Abdomen w/wo contrast impression: Pancreas divisum with separate drainage of the pancreatic duct and common bile duct ar the duodenum. Mild, diffuse pancreatic ductal dilation without obstructing mass or calculus. ;   Dysphagia : Denies any episodes of dysphagia since her last office visit.    Last visit (01/12/2021) Denies continued episodes of dysphagia since her last visit.   Last visit (11/24/2020) Patient denies any dysphagia since her last visit.   She denies any episodes dysphagia since her last visit. She does admits that she feels gas at this time and reports that she flatulants often and is unable to control symptoms.    Last visit (9/15/2020) Denies globus or dysphagia.   Last visit (7/28/2020) She has a globus sensation that occur daily.  Denies dysphagia.    Last visit (7/22/2020)  She has a globus sensation since the past three days with regurgitation .   Patient can not recall starting. in her words ( It doesn't matter now ) Famotidine 20 mg po QHS 30-1    She continue to deny episodes of dysphagia Last visit (5/11/2020) She continue to deny episodes of dysphagia  Last visit (3/31/2020) She continue to deny episodes of dyspragia.  Last visit (3/3/2020) Patient denies episodes of dyspragia since last office visit.  Last visit (1/15/2019) Patient denies any episodes of dysphagia since her EGD with dilation was completed.   10/16/18 Patient continues to deny episodes of dysphagia.   09/18/2018 Patient continues to deny episodes of dysphagia.    08/22/2018 Continue to deny dysphagia since EGD w/dilation.    Last visit (7/18/2018) Patient denies recent episodes of dysphagia.  Last visit (6/26/2018) Patient denies recent episodes of dysphagia.  04/24/2018 Continues to have dysphagia with solid food , upper esophagus , few times a week    At last visit (10/31/2017)Difficulty in swallowing since the past year, few times a month associated with dryness of mouth due to Sjogren's She continue to deny episodes of dyspragia.  Last visit (3/3/2020) Patient denies episodes of dyspragia since last office visit.  Last visit (1/15/2019) Patient denies any episodes of dysphagia since her EGD with dilation was completed.   10/16/18 Patient continues to deny episodes of dysphagia.   09/18/2018 Patient continues to deny episodes of dysphagia.    08/22/2018 Continue to deny dysphagia since EGD w/dilation.    Last visit (7/18/2018) Patient denies recent episodes of dysphagia.  Last visit (6/26/2018) Patient denies recent episodes of dysphagia.  04/24/2018 Continues to have dysphagia with solid food , upper esophagus , few times a week    At last visit (10/31/2017)Difficulty in swallowing since the past year, few times a month associated with dryness of mouth due to Sjogren's    ;   Fatty Liver : She currently denies any jaundice,  dizziness, RUQ pain, bloating, easy bruising or chills. She admits continued use of Vitamin E 400 mg BID. She admits daily fatigue.    Last visit (01/12/2021) She continue to take Vitamin E 400 mg BID.    Last visit (11/24/2020) She continue to deny  jaundice, chills, RUQ pains, dizziness, or light headedness.   Last visit: (11/3/20) Patient currently denies  jaundice, chills, RUQ pains, dizziness, or easy bruising.   Last visit (9/15/2020) She denies jaundice, chills, RUQ pains, dizziness, or light headedness.  Admit increased fatigue over the past month and was experiencing feeling "lethargic".  She saw her Internist last week and report having an extensive amount of blood work completed, which she is awaiting results.    Last visit (7/22/2020) She continue to deny  jaundice, chills, RUQ pains, dizziness, or light headedness   Last visit (3/31/2020) She continue to deny  jaundice, chills, RUQ pains, dizziness, or light headedness. Admit still feeling fatigue she is unsure if this has to do with her Liver or her Sjogren's Disease.   Last visit (3/3/2020) Patient denies jaundice, chills, RUQ pains, dizziness, or light headedness. Admits still feeling fatigue she is unsure if this has to do with her Liver or her Sjogren's Disease.  Last visit (01/15/2019) Patient currently denies jaundice, chills, RUQ pains, dizziness, or light headedness. Admits still feeling fatigue.    10/16/18 Patient was diagnosed with fatty liver in 2017.  She currently denies jaundice, chills, RUQ pains, dizziness, or light headedness Admit easy bruising over past few months, which is being follow by Hematologist Dr. Hakeem Fallon.  Admit several year history of feeling fatigue.    09/18/2018 Patient was diagnosed with fatty liver in 2017.  She currently denies jaundice, chills, RUQ pains, dizziness, or light headedness Admit easy bruising over past few months, which is being follow by Hematologist Dr. Hakeem Fallon.  Admit several year history of feeling fatigue.   08/22/2018 Patient was diagnosed with a fatty liver in 2017. Most recent labs with PCP Dr. Michelle Naidu a month ago due to skin rash from an allergic reaction from an unknown source. Labs completed (7/12/2018) revealed CBC and CMP within normal limits.   She currently denies jaundice, chills, RUQ pains, dizziness, or light headedness Admit easy bruising over past few months, which is being follow by Hematologist Dr. Hakeem Fallon.  Admit several year history of feeling fatigue.    04/24/2018 Patient was found to have fatty liver via CT abdomen on May 3, 2017.  Most recent labs with PCP Dr. Michelle Naidu  Aug. 23, 2017 at which time patient states her liver enzymes were fine, just had elevated calcium levels.  Patient currently denies jaundice, chills, RUQ pains, dizziness, or light headedness Admit easy bruising over past few months, which is being follow by Hematologist Dr. Hakeem Fallon.  Admit several year history of feeling fatigue.   Outside Labs:  (5/3/2016) Glucose 105 (H), Creatinine 1.16 (H), Protein Total 8.5 (H), ALT 36 (N), AST 20 (WNL), Albumin 4.5 (N), Total Bilirubin 0.6 (N), Total Calcium 10.6 (H), Alkaline Phosphatase 65 (N), Lipase 391 (N), WBC 7.2 (N), RBC 4.98 (N), Hemoglobin 15.4 (N), Hematocrit 44.4 (H), Platelet 345 (N).  (10/31/2017)NAFLD Score: -0.979   &lt; -1.455: predictor of absence of significant fibrosis (F0-F2 fibrosis) ? -1.455 to ? 0.675: indeterminate score > 0.675: predictor of presence of significant fibrosis (F3-F4 fibrosis)     Outside Imaging: (5/3/2016) CT Pelvis with impression: No acute intra-abdominal process. Colonic diverticulosis without diverticulitis. Normal appendix. No bowel obstruction. Persistent prominent appearance of the pancreatic duct measuring up to 3.9 mm in maximal diameter. Fatty liver with hepatomegaly.  (05/15/2015) CT Abdomen and Pelvis with contrast impression: Normal appendix. Descending and sigmoid colon diverticula. No diverticulitis. Borderline pancreatic ductal dilation. No biliary ductal dilation. Further evaluation with MRI of the abdomen and MRCP may be obtained. She continue to deny  jaundice, chills, RUQ pains, dizziness, or light headedness. Admit still feeling fatigue she is unsure if this has to do with her Liver or her Sjrogen's Disease.   Last visit (3/3/2020) Patient denies jaundice, chills, RUQ pains, dizziness, or light headedness. Admits still feeling fatigue she is unsure if this has to do with her Liver or her Sjrogen's Disease.  Last visit (01/15/2019) Patient currently denies jaundice, chills, RUQ pains, dizziness, or light headedness. Admits still feeling fatigue.    10/16/18 Patient was diagnosed with fatty liver in 2017.  She currently denies jaundice, chills, RUQ pains, dizziness, or light headedness Admit easy bruising over past few months, which is being follow by Hematologist Dr. Hakeem Fallon.  Admit several year history of feeling fatigue.    09/18/2018 Patient was diagnosed with fatty liver in 2017.  She currently denies jaundice, chills, RUQ pains, dizziness, or light headedness Admit easy bruising over past few months, which is being follow by Hematologist Dr. Hakeem Fallon.  Admit several year history of feeling fatigue.   08/22/2018 Patient was diagnosed with a fatty liver in 2017. Most recent labs with PCP Dr. Michelle Naidu a month ago due to skin rash from an allergic reaction from an unknown source. Labs completed (7/12/2018) revealed CBC and CMP within normal limits.   She currently denies jaundice, chills, RUQ pains, dizziness, or light headedness Admit easy bruising over past few months, which is being follow by Hematologist Dr. Hakeem Fallon.  Admit several year history of feeling fatigue.    04/24/2018 Patient was found to have fatty liver via CT abdomen on May 3, 2017.  Most recent labs with PCP Dr. Michelle Naidu  Aug. 23, 2017 at which time patient states her liver enzymes were fine, just had elevated calcium levels.  Patient currently denies jaundice, chills, RUQ pains, dizziness, or light headedness Admit easy bruising over past few months, which is being follow by Hematologist Dr. Hakeem Fallon.  Admit several year history of feeling fatigue.   Outside Labs:  (5/3/2016) Glucose 105 (H), Creatinine 1.16 (H), Protein Total 8.5 (H), ALT 36 (N), AST 20 (WNL), Albumin 4.5 (N), Total Bilirubin 0.6 (N), Total Calcium 10.6 (H), Alkaline Phosphatase 65 (N), Lipase 391 (N), WBC 7.2 (N), RBC 4.98 (N), Hemoglobin 15.4 (N), Hematocrit 44.4 (H), Platelet 345 (N).  (10/31/2017)NAFLD Score: -0.979   &lt; -1.455: predictor of absence of significant fibrosis (F0-F2 fibrosis) ? -1.455 to ? 0.675: indeterminate score > 0.675: predictor of presence of significant fibrosis (F3-F4 fibrosis)     Outside Imaging: (5/3/2016) CT Pelvis with impression: No acute intra-abdominal process. Colonic diverticulosis without diverticulitis. Normal appendix. No bowel obstruction. Persistent prominent appearance of the pancreatic duct measuring up to 3.9 mm in maximal diameter. Fatty liver with hepatomegaly.  (05/15/2015) CT Abdomen and Pelvis with contrast impression: Normal appendix. Descending and sigmoid colon diverticula. No diverticulitis. Borderline pancreatic ductal dilation. No biliary ductal dilation. Further evaluation with MRI of the abdomen and MRCP may be obtained. ;   Diarrhea : Currently reports 2-3 bowel movements per day, without strain. Her stools are normal and formed. Denies any mucus, melena or blood in her stools. Denies any pruritus ani or rectal pain.    Last visit (01/12/2021) Patient presents today for a follow up of bowel habits.  She admit intermittent  episodes of diarrhea during treatment for Diverticulitis, which she was found to have via CT Abdomen at [facility] on (12/31/2020) ordered by Urogynecologist Dr. Matti Gutierrez following c/o severe LLQ abdominal pain.     She was treated with Cipro 500 mg BID and Flagyl 500 mg TID x 10 days. Dr. Gallagher prescribed Dificid 200 mg 1 BID prophylactically for 10 days.    Patient reports the use of Imodium 1 QD during the episodes of diarrhea with improvement of symptoms. Last dose 1-2 weeks ago.  She currently admits 3 semi-formed bowel movements per day. Denies melena, blood or mucus in stools.   Last visit (12/2/2020) Patient denies any episodes of diarrhea.    Last visit: (11/3/20) She denies any episodes of diarrhea. Patient currently admits 1-2 bowel movements a day with no strain. Her stools are formed without blood, mucus, melena, pruritus ani, or rectal pain.    Last visit (9/15/2020) Patient presents today for a follow up of diarrhea.  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  Treatment consisted of Dificid and Zinplava ( Infusion ), which she completed on (8/18/2020) with marked improvement of symptoms.  She report having normal bowel habits over the past month.  Currently admit 2-4 normal and formed bowel movements per day. Denies melena, blood or mucus in stools.     Patient state she had a follow up with Uro-Gynecologist Dr. Holden suggest she had a 2nd opinion with another GI.  Also she did a rectal exam and was told her sphincter muscle was a 5/10.  She is currently taking Dicyclomine HCl 10 MG and Welchol 3 BID.  She report having yellowish orange stool when she have fecal incontinence, which occur randomly.  Last episodes occurred 5 days ago. Denies melena, blood or mucus in stools.      Last visit (7/28/2020) Currently admit 3 normal and formed evacuations and 1 episodes of watery bowel movements.  Admit associated episodes of fecal incontinence.  She continue to take Continue Welchol 625 MG, 3 BID.  She did not submit the stool study as ordered by Dr. Gallagher yesterday.   Last visit (7/22/2020) Patient admits to 5-6 bowel episodes of diarrhea a day.   Of note, patient contact the office on (7/6/2020) stating she completed the Dificid last week and continue to take Welchol 625 MG She denies diarrhea episodes over the past 2 weeks.  Stools are small semi-formed.  Denies straining. She want to discuss fecal transplant via pill form and Interstim.        Last visit (06/24/2020)  Patient presents today for a follow up of diarrhea, review MRI, and QDx results.  Patient was notified on (6/3/2020) QDx stool study was positive for C. Diff, subsequently she was treated with Vancomycin 125 mg po QID for 10 days.  Patient contact the office on (6/15/2020) c/o symptoms are no better. Also continue to experience abdominal pain and fecal incontinence 5+ times a day.    She also want to know if the cholestyramine come in a tablet form, because it say it can damage teeth, which she already has risk of dental damage from her Sjogren's.   Subsequently, patient was treated with Dificid 200 mg po BID for 10 days. Welchol 625 mg III po BID and Imodium - 1-2,  4 times a day prn.  She has not taken Imodium.   She admit bowel movements were getting back to normal last week over the course of 3 days while on a bland diet with having 3 normal formed BM's per day.  While off the diet she would have 3-5 BM's per day with a couple episodes fecal incontinence.  Denies melena, blood or mucus in stools.     Last visit (5/11/2020) Admit 5-6 loose BM's per day with fecal seepage and LLQ abdominal pain. Symptoms were improved then began to increase several weeks ago.  Denies melena, blood or mucus in stools.  Denies pruritus ani, rectal pain or bleeding.    She report maybe 1-2 days a week she will have normal and formed bowel movements.     Last visit (3/31/2020) Admit 5-6 bowel movements per day with 2-3 episodes of diarrhea at least once a day since last office visit.  Denies melena, blood or mucus in stools.  Of note, patient contact the office on (3/13/2020) with c/o 5-6 loose and watery BM's per day and episodes of fecal incontinence since 2 days after her last visit.  Described as full fecal seepage that occur with bending over or walking.    Subsequently a stool study was ordered, treated with Flagyl 500 mg TID, Bactrim DS BID x 10 days, take Imodium prn with caution.  She completed the 10 day course of Flagyl 500 mg TID, Bactrim DS BID on (3/22/2020).   Last visit (3/3/2020)She reports 2-3 bowel movements a day with no strain. She admits her stools are formed with no bleeding, melena, or mucus at this time.   Last visit (1/15/2019) Patient presents today for a follow up of diarrhea. Patient admits rarely experiencing diarrhea since her last office visit. Patient admits 1-3 bowel movements a day. Stools are normal in form.   Patient admits that she is scheduled to see Dr Holden tomorrow.       Patient denies the use of Welchol Tablet, 625 MG, 2 tablets prn with meals, Orally, TID. Patient states that she wanted to clear her body from the medications that she was taking.   Patient denies the use of Dicyclomine HCl Capsule, 10 MG, 1 tablet prn, Orally, TID. Patient states that she felt that this medication wasn't helping her so she stopped taking it.    10/16/18 Patient presents today for a follow up of diarrhea. She states there has been an improvement of symptoms and she admits to having 2-3 bowel movements per day. Ranging from solid to loose stools.   Patient was evaluated by Dr. Holden a Urogynecologist at [facility] who administered a pessary, which is a prosthetic device that can be inserted into the vagina to support its internal structure. She was advised this device tends to improve diarrhea and fecal incontinence in patients that have rectocele. Patient also has started Pelvic Floor Therapy and has completed two sessions.   Of note, patient never started Viberzi Tablet, 75 MG.   09/18/2018 Patient presents today for a follow up of diarrhea. She continues to have episodes of watery/loose stools daily. She states she can have up to 5-6 bowel movements per day. She denies blood, mucous or melena.    08/22/2018 Patient presents today to review QDx stool study, lab results and follow up of diarrhea. She has been taking Welchol 625 MG, 2 BID with some improvement in bowel habits.  Currently admit 2-3 defecations per day.  Stools are 50% of the time normal formed and loose/watery.  Denies melena, blood or mucus in stools.  Denies fecal urgency.  Continue to admit episodes of fecal incontinence.      Last visit (7/18/2018) Patient presents today for a follow up after her colonoscopy, EGD and labs.  She denies any complications after her procedure. She continues to have 3-4 bowel movements per day ranging from solid to watery/loose. She denies mucous or melena. She does admit to occasional bright red blood present on toilet paper following a bowel movement. She denies taking  Imodium A-D or Hyoscyamine.  Last visit (6/26/2018) Patient presents today for a consultation of diarrhea since the past 6 months . She states that symptoms started around June 9 th, 2018 after eating dinner. She was evaluated by [facility] on Hannah 10, 2018 for her symptoms. At that time stool studies were completed. She is currently having approximately five watery/loose stools per day. She denies any blood, mucous or melena. However, patient admits the amount of stools may increase if she consumes more food. She has been avoiding eating because that triggers her to have a bowel movement. She has had an eight pound unintentional weight loss since her last visit on 04/24/2018.    She did have an MRI of the abdomen completed on 04/06/2018 which revealed subtle sludge or concentrated bile within the gallbladder.  Per patient her last colonoscopy was 2-3 years ago. She denies having colon polyps at that time.  Of, note patient was prescribed Keflex on May 20, 2018. Admit 5-6 bowel movements per day with 2-3 episodes of diarrhea at least once a day since last office visit.  Denies melena, blood or mucus in stools.  Of note, patient contact the office on (3/13/2020) with c/o 5-6 loose and watery BM's per day and episodes of fecal incontinence since 2 days after her last visit.  Described as full fecal seepage that occur with bending over or walking.    Subsequently a stool study was ordered, treated with Flagyl 500 mg TID, Bactrim DS BID x 10 days, take Imodium prn with caution.  She completed the 10 day course of Flagyl 500 mg TID, Bactrim DS BID on (3/22/2020).   Last visit (3/3/2020)She reports 2-3 bowel movements a day with no strain. She admits her stools are formed with no bleeding, melena, or mucus at this time.   Last visit (1/15/2019) Patient presents today for a follow up of diarrhea. Patient admits rarely experiencing diarrhea since her last office visit. Patient admits 1-3 bowel movements a day. Stools are normal in form.   Patient admits that she is scheduled to see Dr Holden tomorrow.       Patient denies the use of Welchol Tablet, 625 MG, 2 tablets prn with meals, Orally, TID. Patient states that she wanted to clear her body from the medications that she was taking.   Patient denies the use of Dicyclomine HCl Capsule, 10 MG, 1 tablet prn, Orally, TID. Patient states that she felt that this medication wasn't helping her so she stopped taking it.    10/16/18 Patient presents today for a follow up of diarrhea. She states there has been an improvement of symptoms and she admits to having 2-3 bowel movements per day. Ranging from solid to loose stools.   Patient was evaluated by Dr. Holden a Urogynecologist at [facility] who administered a pessary, which is a prosthetic device that can be inserted into the vagina to support its internal structure. She was advised this device tends to improve diarrhea and fecal incontinence in patients that have rectocele. Patient also has started Pelvic Floor Therapy and has completed two sessions.   Of note, patient never started Viberzi Tablet, 75 MG.   09/18/2018 Patient presents today for a follow up of diarrhea. She continues to have episodes of watery/loose stools daily. She states she can have up to 5-6 bowel movements per day. She denies blood, mucous or melena.    08/22/2018 Patient presents today to review QDx stool study, lab results and follow up of diarrhea. She has been taking Welchol 625 MG, 2 BID with some improvement in bowel habits.  Currently admit 2-3 defecations per day.  Stools are 50% of the time normal formed and loose/watery.  Denies melena, blood or mucus in stools.  Denies fecal urgency.  Continue to admit episodes of fecal incontinence.      Last visit (7/18/2018) Patient presents today for a follow up after her colonoscopy, EGD and labs.  She denies any complications after her procedure. She continues to have 3-4 bowel movements per day ranging from solid to watery/loose. She denies mucous or melena. She does admit to occasional bright red blood present on toilet paper following a bowel movement. She denies taking  Imodium A-D or Hyoscyamine.  Last visit (6/26/2018) Patient presents today for a consultation of diarrhea since the past 6 months . She states that symptoms started around June 9 th, 2018 after eating dinner. She was evaluated by [facility] on Hannah 10, 2018 for her symptoms. At that time stool studies were completed. She is currently having approximately five watery/loose stools per day. She denies any blood, mucous or melena. However, patient admits the amount of stools may increase if she consumes more food. She has been avoiding eating because that triggers her to have a bowel movement. She has had an eight pound unintentional weight loss since her last visit on 04/24/2018.    She did have an MRI of the abdomen completed on 04/06/2018 which revealed subtle sludge or concentrated bile within the gallbladder.  Per patient her last colonoscopy was 2-3 years ago. She denies having colon polyps at that time.  Of, note patient was prescribed Keflex on May 20, 2018.;   Elevated Liver Enzymes : Most recent labs were completed 12/2020. She currently denies any jaundice, dizziness, RUQ pain, bloating, easy bruising or chills. She admits daily fatigue.    Last visit (01/12/2021) CBC is the most recent labs completed on (12/11/2020) at [facility].   Currently denies  jaundice, chills, RUQ pains, dizziness, or easy bruising.  Admits to having ocassional episodes of gas.    Last visit (11/24/2020) 59 Y/O female presents today to review labs and follow up for elevated liver enzymes.  Labs completed on (11/10/2020) CMP: ALT (High) 63. Hepatic Function Panel: ALT (High) 69.   Currently denies  jaundice, chills, RUQ pains, dizziness, or easy bruising.  Admits to having ocassional episodes of gas.   Last visit: (11/03/20) Patient presents today for a consultation of elevated liver enzymes, which were found via labs with PCP, CMP: AST (High) 75 and ALT (High) 154  Patient denies a history of elevated liver enzymes.  Patient currently denies  jaundice, chills, RUQ pains, dizziness, or easy bruising. She admits fatigue but associate symptoms with Sjogren's Disease.   RISK FACTORS: Denies Admits Alcohol, drugs, travel outside the [place], NSAID's, protein supplements, tattoos, piercing's,  service, blood transfusion, family history of liver disease or cancer, personal exposure to liver diseases, longterm, rehab   Versepa 1 mg 2 tabs in AM and 2 tabs in PM Mybetriq 50 mg once at bedtime;   Hospital Follow Up : Denies any ER visits or hospital stays since her last visit.     Last visit (11/24/2020) Patient denies any recent hospitalization since last visit.   Last visit (9/15/2020)Denies any hospitalization since last visit.   Last visit (7/28/2020) Patient presented to [facility] on (7/23/2020) at the advise of our office due to uncontrolled severe abdominal pain.    CT Abd/Pelvis with contrast was performed revealing No findings to account for abdominal pain   Patient was discharged (7/23/2020) after labs, ct reviewed. Dr. Shah, radiology consulted advising mesenteric vessels unremarkable. Based on results, pt will be d/c with outpt follow up to ob/gyn and GI, Dr Ivory.     Last visit (7/22/2020)  Denies any hospitalization since last visit.  Last visit (3/31/2020) Denies any hospitalization since last visit.   Patient presented to [facility] ED June 22, 2019 with abdominal pain.  Onset 5 days prior to ER.  Described as a constant pain located right abdomen and flank area.  Denies any aggravating factors. Denies any hospitalization since.     Patient presented to [facility] ED June 22, 2019 with abdominal pain.  Onset 5 days prior to ER.  Described as a constant pain located right abdomen and flank area.  Denies any aggravating factors.;   Enterocolitis : 61-Year-old female patient presents today for a 2 month follow up of entercolitis. She denies any cramping/abdominal pain, fecal urgency/incontinence, fever, diarrhea. She admits daily fatigue. She reports starting Florastor 240 mg 1 BID with  improvement of symptoms.   She denies following up with Dr. Gallagher. Per Dr. Gallagher's office she was last seen 07/2020.     Of note:  Patient called the office 01/21/2021 reporting 3 BMs today with loose and watery stools after eating a salad she denies any blood or mucus present. She admits  x2 days. She c/o bloating/gas, she admits her stomach is distended and she looks "like she is 4 months pregnant." She also c/o a globus sensation and a burning on her tongue as well as a yellow color to her tongue.  She was advised to be on liquids  and soft diet for a few days. Pepcid 20 mg po qd for 14 days . Bentyl 10 mg po TID prn for 10 days.;

## 2021-03-16 ENCOUNTER — OFFICE VISIT (OUTPATIENT)
Dept: URBAN - METROPOLITAN AREA CLINIC 31 | Facility: CLINIC | Age: 61
End: 2021-03-16

## 2021-03-25 ENCOUNTER — TELEPHONE ENCOUNTER (OUTPATIENT)
Dept: URBAN - METROPOLITAN AREA CLINIC 35 | Facility: CLINIC | Age: 61
End: 2021-03-25

## 2021-06-03 ENCOUNTER — TELEPHONE ENCOUNTER (OUTPATIENT)
Dept: URBAN - METROPOLITAN AREA CLINIC 35 | Facility: CLINIC | Age: 61
End: 2021-06-03

## 2021-06-03 RX ORDER — NIFEDIPINE
0.3% AS DIRECTED POWDER (GRAM) MISCELLANEOUS TID
Qty: 30 GRAM | Refills: 0 | OUTPATIENT
Start: 2021-06-03

## 2021-06-04 ENCOUNTER — TELEPHONE ENCOUNTER (OUTPATIENT)
Dept: URBAN - METROPOLITAN AREA CLINIC 35 | Facility: CLINIC | Age: 61
End: 2021-06-04

## 2021-06-04 RX ORDER — NIFEDIPINE
0.3% AS DIRECTED POWDER (GRAM) MISCELLANEOUS TID
Qty: 30 GRAM | Refills: 0 | OUTPATIENT
Start: 2021-06-03

## 2021-07-13 ENCOUNTER — OFFICE VISIT (OUTPATIENT)
Dept: URBAN - METROPOLITAN AREA CLINIC 35 | Facility: CLINIC | Age: 61
End: 2021-07-13

## 2021-07-13 VITALS
WEIGHT: 160 LBS | SYSTOLIC BLOOD PRESSURE: 128 MMHG | DIASTOLIC BLOOD PRESSURE: 82 MMHG | BODY MASS INDEX: 27.31 KG/M2 | OXYGEN SATURATION: 98 % | HEART RATE: 90 BPM | HEIGHT: 64 IN

## 2021-07-13 RX ORDER — BUPROPION HYDROCHLORIDE 300 MG/1
1 TABLET IN THE MORNING TABLET, EXTENDED RELEASE ORAL ONCE A DAY
Status: ACTIVE | COMMUNITY

## 2021-07-13 RX ORDER — FENUGREEK SEED/BL.THISTLE/ANIS 340 MG
AS DIRECTED CAPSULE ORAL
Status: ACTIVE | COMMUNITY

## 2021-07-13 RX ORDER — CIPROFLOXACIN 500 MG/1
TAKE ONE TABLET BY MOUTH EVERY 12 HOURS FOR 10 DAYS TABLET, FILM COATED ORAL
Qty: 20 UNSPECIFIED | Refills: 0 | Status: ON HOLD | COMMUNITY

## 2021-07-13 RX ORDER — PRAVASTATIN SODIUM 20 MG/1
1 TABLET TABLET ORAL ONCE A DAY
Status: ACTIVE | COMMUNITY

## 2021-07-13 RX ORDER — ESOMEPRAZOLE MAGNESIUM 40 MG/1
1 CAPSULE CAPSULE, DELAYED RELEASE ORAL ONCE A DAY
Status: ON HOLD | COMMUNITY

## 2021-07-13 RX ORDER — ALPRAZOLAM 2 MG/1
1 TABLET TABLET, EXTENDED RELEASE ORAL TWICE A DAY
Status: ACTIVE | COMMUNITY

## 2021-07-13 RX ORDER — ICOSAPENT ETHYL 1000 MG/1
2 CAPSULES WITH MEALS CAPSULE ORAL TWICE A DAY
Qty: 120 | Status: ACTIVE | COMMUNITY

## 2021-07-13 RX ORDER — HYDROXYCHLOROQUINE SULFATE 200 MG/1
1 TABLET WITH FOOD OR MILK TABLET ORAL TWICE A DAY
Status: ON HOLD | COMMUNITY

## 2021-07-13 RX ORDER — CIPROFLOXACIN HCL 500 MG
1 TABLET TABLET ORAL
Qty: 20 | Refills: 0 | Status: ON HOLD | COMMUNITY
Start: 2020-03-12

## 2021-07-13 RX ORDER — NIFEDIPINE
0.3% AS DIRECTED POWDER (GRAM) MISCELLANEOUS TID
Qty: 30 GRAM | Refills: 0 | Status: ACTIVE | COMMUNITY
Start: 2021-06-03

## 2021-07-13 RX ORDER — HYDROXYCHLOROQUINE SULFATE 200 MG/1
1 TABLET WITH FOOD OR MILK TABLET ORAL BID
Status: ACTIVE | COMMUNITY

## 2021-07-13 RX ORDER — CELECOXIB 100 MG/1
1 CAPSULE WITH FOOD CAPSULE ORAL TWICE A DAY
Qty: 28 CAPSULE | Refills: 0 | Status: ON HOLD | COMMUNITY
Start: 2020-07-24

## 2021-07-13 RX ORDER — CHOLESTYRAMINE 4 G/9G
1 PACKET MIXED WITH WATER OR NON-CARBONATED DRINK POWDER, FOR SUSPENSION ORAL TWICE A DAY
Qty: 60 | Refills: 1 | Status: ON HOLD | COMMUNITY
Start: 2020-06-11

## 2021-07-13 RX ORDER — METRONIDAZOLE 500 MG/1
1 TABLET TABLET, FILM COATED ORAL THREE TIMES A DAY
Qty: 30 | Refills: 0 | Status: ON HOLD | COMMUNITY
Start: 2020-03-12

## 2021-07-13 RX ORDER — LEVOMEFOLATE/ALGAL OIL 15-90.314
1 CAPSULE CAPSULE ORAL ONCE A DAY
Status: ACTIVE | COMMUNITY

## 2021-07-13 RX ORDER — MAGNESIUM OXIDE/MAG AA CHELATE 300 MG
1 CAPSULE WITH A MEAL CAPSULE ORAL ONCE A DAY
Status: ACTIVE | COMMUNITY

## 2021-07-13 RX ORDER — ELUXADOLINE 75 MG/1
1 TABLET WITH FOOD TABLET, FILM COATED ORAL TWICE A DAY
Status: ON HOLD | COMMUNITY
Start: 2018-09-18

## 2021-07-13 RX ORDER — RIZATRIPTAN BENZOATE 5 MG/1
1 TABLET ON THE TONGUE AND ALLOW TO DISSOLVE AS NEEDED ONE TIME TABLET, ORALLY DISINTEGRATING ORAL ONCE A DAY
Status: ON HOLD | COMMUNITY

## 2021-07-13 RX ORDER — SACCHAROMYCES BOULARDII 50 MG
1 CAPSULE CAPSULE ORAL TWICE A DAY
Status: ON HOLD | COMMUNITY

## 2021-07-13 RX ORDER — MONTELUKAST SODIUM 10 MG/1
1 TABLET IN THE EVENING TABLET, FILM COATED ORAL ONCE A DAY
Status: ACTIVE | COMMUNITY

## 2021-07-13 RX ORDER — VORTIOXETINE 20 MG/1
1 TABLET TABLET, FILM COATED ORAL ONCE A DAY
Status: ACTIVE | COMMUNITY

## 2021-07-13 RX ORDER — FIDAXOMICIN 200 MG/1
1 TABLET TABLET, FILM COATED ORAL TWICE A DAY
Qty: 20 | Refills: 0 | Status: ON HOLD | COMMUNITY
Start: 2020-06-15

## 2021-07-13 RX ORDER — HYOSCYAMINE SULFATE 0.125 MG
1 TABLET AS NEEDED TABLET ORAL
Qty: 60 TABLET | Refills: 1 | Status: ON HOLD | COMMUNITY
Start: 2018-06-27

## 2021-07-13 RX ORDER — METRONIDAZOLE 500 MG/1
TAKE ONE TABLET BY MOUTH EVERY 8 HOURS FOR 10 DAYS TABLET, FILM COATED ORAL
Qty: 30 UNSPECIFIED | Refills: 0 | Status: ON HOLD | COMMUNITY

## 2021-07-13 RX ORDER — SULFAMETHOXAZOLE AND TRIMETHOPRIM 800; 160 MG/1; MG/1
1 TABLET TABLET ORAL TWICE A DAY
Qty: 20 | Refills: 0 | Status: ON HOLD | COMMUNITY
Start: 2020-03-13

## 2021-07-13 RX ORDER — MIRABEGRON 50 MG/1
1 TABLET TABLET, FILM COATED, EXTENDED RELEASE ORAL ONCE A DAY
Qty: 30 | Status: ACTIVE | COMMUNITY

## 2021-07-13 RX ORDER — SPIRONOLACTONE 25 MG/1
1 TABLET TABLET ORAL ONCE A DAY
Status: ACTIVE | COMMUNITY

## 2021-07-13 RX ORDER — FIDAXOMICIN 200 MG/1
1 TABLET TABLET, FILM COATED ORAL TWICE A DAY
Qty: 10 TABLET | Status: ON HOLD | COMMUNITY
Start: 2020-06-24

## 2021-07-13 RX ORDER — GABAPENTIN 100 MG/1
1 CAPSULE CAPSULE ORAL ONCE A DAY
Status: ON HOLD | COMMUNITY

## 2021-07-13 RX ORDER — SACCHAROMYCES BOULARDII 50 MG
1 CAPSULE CAPSULE ORAL TWICE A DAY
Qty: 60 | Status: ACTIVE | COMMUNITY
Start: 2021-01-12

## 2021-07-13 RX ORDER — MELOXICAM 15 MG
1 TABLET TABLET ORAL TWICE A DAY
Status: ACTIVE | COMMUNITY

## 2021-07-13 RX ORDER — VANCOMYCIN HYDROCHLORIDE 125 MG/1
1 CAPSULE CAPSULE ORAL
Qty: 40 | Refills: 0 | Status: ON HOLD | COMMUNITY
Start: 2020-06-03

## 2021-07-13 RX ORDER — TRAMADOL HYDROCHLORIDE 50 MG/1
TABLET, FILM COATED ORAL TWICE DAILY
Status: ON HOLD | COMMUNITY

## 2021-07-13 RX ORDER — NITROFURANTOIN MONOHYDRATE/MACROCRYSTALLINE 25; 75 MG/1; MG/1
1 CAPSULE AT BEDTIME WITH FOOD CAPSULE ORAL ONCE A DAY
Qty: 30 | Status: ON HOLD | COMMUNITY

## 2021-07-13 RX ORDER — HYDROCORTISONE ACETATE 0.5 %
AS DIRECTED CREAM (GRAM) TOPICAL
Status: ACTIVE | COMMUNITY

## 2021-07-13 RX ORDER — COLESEVELAM HYDROCHLORIDE 625 MG/1
3 TABLETS WITH MEALS TABLET, FILM COATED ORAL TWICE A DAY
Status: ON HOLD | COMMUNITY
Start: 2020-06-15

## 2021-07-13 RX ORDER — SACCHAROMYCES BOULARDII 50 MG
1 CAPSULE CAPSULE ORAL TWICE A DAY
OUTPATIENT

## 2021-07-13 RX ORDER — B-COMPLEX WITH VITAMIN C
1 TABLET TABLET ORAL ONCE A DAY
Status: ACTIVE | COMMUNITY

## 2021-07-13 RX ORDER — PRASTERONE 6.5 MG/1
1 VAGINAL _INSERT AT BEDTIME INSERT VAGINAL ONCE A DAY
Qty: 30 | Status: ACTIVE | COMMUNITY

## 2021-07-13 RX ORDER — MESALAMINE 1000 MG/1
1 SUPPOSITORY SUPPOSITORY RECTAL BID
Qty: 60 | Refills: 1 | Status: ON HOLD | COMMUNITY
Start: 2018-07-13

## 2021-07-13 RX ORDER — NEBIVOLOL HYDROCHLORIDE 5 MG/1
1 TABLET TABLET ORAL ONCE A DAY
Status: ACTIVE | COMMUNITY

## 2021-07-13 RX ORDER — ZOLPIDEM TARTRATE 5 MG/1
1 TABLET AT BEDTIME TABLET, FILM COATED ORAL ONCE A DAY
Status: ON HOLD | COMMUNITY
Start: 2021-01-12

## 2021-07-13 RX ORDER — MULTIVIT-MIN/IRON/FOLIC ACID/K 18-600-40
AS DIRECTED CAPSULE ORAL
Status: ACTIVE | COMMUNITY

## 2021-07-13 RX ORDER — ASCORBIC ACID 125 MG
AS DIRECTED TABLET,CHEWABLE ORAL
Status: ACTIVE | COMMUNITY

## 2021-07-13 NOTE — HPI-MIGRATED HPI
;   ;   ;   ;   ;   ;   ;   ;   ;   ;   ;     Abdominal Pain : Patient reports that the abdominal pain has resolved at this time.    Last visit (3/9/2021) Denies any episodes of abdominal pain since her last office visit.    Last visit (01/12/2021) Patient was diagnosed with Diverticulitis via CT Abdomen at [facility] on (12/31/2020) ordered by Urogynecologist Dr. Matti Gutierrez following c/o severe LLQ abdominal pain.  Subsequently she was treated with Flagyl 500 mg 1 TID and Cipro BID x 10 days.  Dr. Gallagher added prophylactic treatment with Dificid 200 mg 1 BID.  She completed the antibiotics yesterday.  She continue to admit intermittent LLQ abdominal pain, which she isn't sure if it's post op pain from her Laparoscopic supracervical hysterectomy and bilateral salpingectomy, and Laparoscopic sacrocervicopexy with Restorelle "Y"-shaped mesh performed on (12/14/2020).    Last visit (11/24/2020) Patient admits that her abdominal pain has resolved.    Last visit (9/15/2020) Abdominal pain episode after consuming pizza and banana pepper. Symptoms last for a few minutes then dissipate on it's own.   Last visit (7/28/2020) Patient presents today via televisit with consent for a follow up of abdominal pain.  Pain is located just below and periumbilical.  Described as a dull ache that occur daily in an intermittent pattern.  She will wake up with diffuse lower back pain and abdominal pain. Back pain gradually dissipate over the course of an hour,  but the abdominal pain continue to persist.  She report no clear aggravating factor, but occasionally will get relief with laying on her right side.   She is taking Dicyclomine 10 mg 1 TID without significant benefit.  She did not try the Celebrex after reading the warning insert.   She has an appt. with her Urogynecologist on Friday.   Of note, patient presented to [facility] on (7/23/2020) at the advise of our office due to uncontrolled severe abdominal pain.   Last visit (7/22/2020) Of note, patient contact the office on (7/6/2020) stating she completed the Dificid last week and continue to take Welchol 625 MG and hyoscyamine, but continue to have lower abdominal pain.  Pain is constant and described as a cramping pain. She report the hyoscyamine is not as effective.    Symptoms start upon waking up in the mornings and persist throughout the day. Patient state "something's got to give".  Subsequently she was advised to  Stop Hyoscyamine and start Dicyclomine 10 mg po QID prn   Patient reports Dicyclomine 10 mg po QID prn is not effective.    Last visit (06/24/2020)  Patient contact the office on (5/15/2020) c/o continue to experience abdominal pain and fecal incontinence 5+ times a day.   She admit lower abdominal pain below the umbilicus. Symptoms are present most morning upon waking up and will persist throughout the day.  She has been taking Hyoscyamine 2-3 times a day with temporary relief.       Last visit (5/15/2020) Patient presents today via televisit with consent for a follow up of increase LLQ abdominal pain.   Pain has been present constant and described as a dull ache. Denies any aggravating factors.   Admit some relief with the use of Hyoscyamine 1 BID.  She contact the office on (3/8/2020) c/o 5-6 loose BM's per day with fecal seepage and LLQ abdominal pain. Symptoms were improved then began to increase several weeks ago.  Denies melena, blood or mucus in stools. Denies pruritus ani, rectal pain or bleeding.   Abdominal pain is described as a dull ache that occur randomly throughout the day and will last for minutes to hours.  She ran out of the Hyoscyamine weeks ago.  Denies fever or chills.  Subsequently patient was informed to go to ER if LLQ pain is persistent and worse.      Last visit (3/31/2020) Patient presents today via tele visit to review CT, labs, stool study and follow up of LLQ abdominal pain associated with diverticulitis.  She admit improvement of symptoms with the use of Hyoscyamine and Ibuprofen 800 mg prn .  Last episode occurred yesterday.   Described as a sharp pain, that is typically present each day upon waking in the mornings.  She completed treatment for diverticulitis, Bactrim DS and Flagyl on (3/22/2020)   Of note, patient contact the office on (3/13/2020) with c/o 5-6 loose and watery BM's per day and episodes of fecal incontinence since 2 days after her last visit.  Described as full fecal seepage that occur with bending over or walking.   She state she has been treated with 3 antibiotics since Jan. 2020- Cefdnir x 10 days in Jan. 2020 for URI, then Cipro 500 mg 1 BID x 7 days on (2/3/2020), which she took for 3 days then told to d/c and start Moxifloxacin 100 mg x 10 days on (2/11/2020) due to the Diverticulitis.   Subsequently a stool study was ordered, treated with Flagyl 500 mg TID, Bactrim DS BID x 10 days, take Imodium prn with caution.  On (3/19/2020) patient continue to c/o LLQ abdominal pain upon waking up in the mornings. Pain is sharp in nature.  She will take Hyoscyamine, but it will take 1-2 hours to get relief.   Last visit (3/3/2020) Patient presents today for a follow up of abdominal pain. She denies any episodes of abdominal pain since her last office visit.   Patient had a CT Chest/ABD/Pelvis completed on 2/5/2020 showing mild diverticulitis of the proximal descending colon, with mild moderate inflammatory changes, including fat stranding and trace fluid. Moderate diffuse diverticulosis of the colon, more extensively involving the left colon. Groundglass infiltrate in the posterior right upper lobe, suspicious for possible pneumonia. Mild hepatic steatosis.   Patient admits that she was on abx and steroids due to a respiratory infection in December   Last visit (1/15/2019) Patient admits experiencing abdominal pain rarely since her last office visit.    10/16/18 Patient marks improvement of abdominal pain. She states that she has had 4-5 episodes since her last office visit 09/18/2018.  09/18/2018 Patient continues to have abdominal pain daily. She describes episodes as a cramping sensation throughout her lower abdomen. She typically begins to have symptoms after consuming a meal.  She is unsure if symptoms are improved with Dicyclomine 10 mg BID.   Of note, Patient called the office on 09/05/2018 complaining of abdominal cramping and  fecal incontinence. As a result we increased her Welchol 625mg from 2 tabs po bid to 2 tabs po tid.   08/22/2018 She admit improvement of symptoms with the use of Dicyclomine 10 mg BID.    Of note, patient contact the office (8/1/2018) stating the Hyoscyamine wasn't as effective, subsequently she was changed to Dicyclomine 10 mg po TID prn.  Last visit (7/18/2018) Patient continues to have abdominal discomfort. She admits the pain is infrequent and ranges from the left to right side of her abdomen. She continues to deny any aggravating factors.    Last visit (6/26/2018) Patient continues to have right sided abdominal pain accompanied with diarrhea daily. She describes episodes as a severe cramping sensation. She denies any aggravating factors.    04/24/2018 Continues to have right sided abdominal pain , intermittent severity , constant . No clear aggravating or alleviating factors     At last visit (10/31/2017) Admits abdominal pain that is located mid region and occur in an intermittent pattern.  Onset Dec. 2016 right after her hemorrhoidectomy and never got better.  Described as a dull ache that last for minutes to hours at a time.   Symptoms are mostly noticed upon waking up.  Admit relief of symptoms after defecation.     Patient denies any episodes of RUQ abdominal pain this year.  Symptoms were described as a dull aching to crampy and sharp pain.  Pain was initially intermittently, then  began to  worsen first of May. Patient notes as symptoms progressively worsen she went to [facility] 5/3/2016 and at that time her pain was severe, level 8 /10.  The Location of pain at the time she was seen in ER was RUQ abdomen and radiated to right side of her back, and middle of the back. Pain was worse with inhalation.  She had CT Abdomen, Chest Xray and was treated with IV fluids, Morphine with great relief of symptoms. Patient was discharged with [place] 5/325 #12 and to follow up with GI, and PCP.  She denies any abdominal pain since the day after she discharged from [facility].  Patient denies the use of antibiotics within the last (6) months. 	    Outside Labs:  (5/3/2016) Glucose 105 (H), Creatinine 1.16 (H), Protein Total 8.5 (H), ALT 36 (N), AST 20 (N), Albumin 4.5 (N), Total Bilirubin 0.6 (N), Total Calcium 10.6 (H), Alkaline Phosphatase 65 (N), Lipase 391 (N), WBC 7.2 (N), RBC 4.98 (N), Hemoglobin 15.4 (N), Hematocrit 44.4 (H), Platelet 345 (N).    Outside Imaging: (5/3/2016) CT Pelvis with impression: No acute intra-abdominal process. Colonic diverticulosis without diverticulitis. Normal appendix. No bowel obstruction. Persistent prominent appearance of the pancreatic duct measuring up to 3.9 mm in maximal diameter. Fatty liver with hepatomegaly.  (05/15/2015) CT Abdomen and Pelvis with contrast impression: Normal appendix. Descending and sigmoid colon diverticula. No diverticulitis. Borderline pancreatic ductal dilation. No biliary ductal dilation. Further evaluation with MRI of the abdomen and MRCP may be obtained.;   Gas : She admits improvement in episodes of gas but admits symptoms still remain. Patient reports that she is able to pass the gas via flatulence.    Last visit (3/9/2021) Denies any episodes of flatulence or belching since her last office visit.    Last visit (01/12/2021) Denies any abnormal amounts of gas since her last visit.    Last visit (11/24/2020) Patient states she has experienced some gas throughout the day. Admits she has been able to pass the gas. Denies any OTC medications for relief.;   Fecal incontinence : She denies any fecal incontinence episodes since her last office visit.    Last visit (3/9/2021) Denies any fecal incontinence episodes.   Last visit (01/12/2021) She admits 1-2 episodes of fecal incontinence during treatment of Cipro and Flagyl for Diverticulitis.  Last occurred week ago.   Last visit (11/24/2020) She denies any episodes of fecal incontinence since her last visit.    Last visit (9/15/2020) She report having normal bowel habits over the past month without episodes of fecal incontinence or associated fecal urgency.   Last visit (7/28/2020) Admit random episodes of fecal incontinence with associated fecal urgency.   Last visit (7/22/2020) Continue to experience Fecal incontinence episodes daily.   Symptoms are associated with diarrhea episodes, at least 5-6 times a day.  Last visit (06/24/2020) Continue to experience Fecal incontinence episodes daily associated with diarrhea episodes, 5+ times a day..  She has been wearing under pads due to symptoms.   Last visit (5/11/2020) Admit having fecal incontinence while sitting last week. She has a Cystocele/Rectocele since 2016 and report having Vaginal Pessary Inserted in (2019) performed by Dr. Edie Holden.   Last visit (3/31/2020) Continue to experience Fecal incontinence episodes daily associated with diarrhea episodes.  She has been wearing under pads due to symptoms.    Of note, patient contact the office on (3/13/2020) with c/o 5-6 loose and watery BM's per day and episodes of fecal incontinence since 2 days after her last visit.  Described as full fecal seepage that occur with bending over or walking.      Last visit (3/3/2020) Patient denies episodes of Fecal incontinence since last office visit.  Last visit (1/15/2019) Patient denies experiencing any fecal incontinence episodes since her last office visit.    10/16/18 Patient states that she has had improvement of symptoms. She has had less episodes since having a pessary device inserted.  09/18/2018 Patient continues to have episodes of fecal incontinence daily.   08/22/2018 She continue to experience fecal incontinence. Symptoms occur 1-2 times per day.  She continue to ware a pad in her under garment, because she does not feel the leakage.    Last visit (7/18/2018)Patient continues to have fecal incontinence.   Last visit (6/26/2018) Patient continues to have fecal incontinence. She states that she has seen Dr. Butt since her last visit. Of note, this was prior to her having diarrhea. At the time of her consultation he diagnosed her with cystocele and rectocele. In addition, they discussed her having a device placed in her back to help control her sphincter.   04/24/2018 Continues to have fecal incontinence and will be following up with Dr. ATUL Butt Continue to experience Fecal incontinence episodes daily associated with diarrhea episodes.  she has been wearing under pads due to symptoms.    Of note, patient contact the office on (3/13/2020) with c/o 5-6 loose and watery BM's per day and episodes of fecal incontinence since 2 days after her last visit.  Described as full fecal seepage that occur with bending over or walking.      Last visit (3/3/2020) Patient denies episodes of Fecal incontinence since last office visit.  Last visit (1/15/2019) Patient denies experiencing any fecal incontinence episodes since her last office visit.    10/16/18 Patient states that she has had improvement of symptoms. She has had less episodes since having a pessary device inserted.  09/18/2018 Patient continues to have episodes of fecal incontinence daily.   08/22/2018 She continue to experience fecal incontinence. Symptoms occur 1-2 times per day.  She continue to ware a pad in her under garment, because she does not feel the leakage.    Last visit (7/18/2018)Patient continues to have fecal incontinence.   Last visit (6/26/2018) Patient continues to have fecal incontinence. She states that she has seen Dr. Butt since her last visit. Of note, this was prior to her having diarrhea. At the time of her consultation he diagnosed her with cystocele and rectocele. In addition, they discussed her having a device placed in her back to help control her sphincter.   04/24/2018 Continues to have fecal incontinence and will be following up with Dr. ATUL Butt;   Rectal Pain : She denies any rectal pain at this time.    Last visit (3/9/2021)  Patient states she was diagnosed with Proctalgia fugax several years ago and was treated by a doctor in [place] years ago with a numbing ointment that helped.  She c/o recent onset flare up with severe stabing pain around her rectum and anus that will occasionally last for 30 minutes at a time. Pain usualy begin at night.  Patient would like to know if Dr Ivory will sent Rx for the ointment.      Action Taken  Demian Tavarez 6/3/2021 10:52:13 AM > Spoke with patient who state flare up began a few weeks ago.  She admit being under alot of stress lately that could be the cause of the flare up. Symptoms only occur nocturnally. Giuliano Ivory  6/3/2021 4:38:11 PM > We can prescribe Nifidipine 0.3 % TID for 4 weeks 30 gm Demian Tavarez 6/3/2021 4:55:35 PM > Patient notified and Rx sent to [facility]      Refills Start Nifedipine ointment ointment, rectal, 30 Gram, 0.3% as directed, TID, 30 days, Refills=  ;   Abnormal MRI : She denies any imaging completed since her last visit.   Last visit (3/9/2021)  Last visit (01/12/2021) Patient recently had a CT completed revealing Diverticulitis. She has been treated with Cipro 500mg, Flagyl 500mg, and Dificid (prophylactically) x 10 days.   Last visit (1/15/2019) Patient admits that she has had a heart test completed as well as a MRI of her neck completed in December and she has not yet received the results.  10/16/2018 Deny any recent imaging completed.  09/18/2018 No recent imaging completed.  04/24/2018 Patient presents today for a follow up of her pancreas  divisum which was diagnosed via MRI Abdomen May 29, 2015.  She notes of having low back pain in May 2015 and was seen by her PCP who subsequently ordered an MRI Abdomen w/wo contrast.  (5/29/2015) MR Abdomen w/wo contrast impression: Pancreas divisum with separate drainage of the pancreatic duct and common bile duct ar the duodenum. Mild, diffuse pancreatic ductal dilation without obstructing mass or calculus.    Last visit (1/15/2019) Patient admits that she has had a heart test completed as well as a MRI of her neck completed in December and she has not yet received the results.  10/16/18 No recent imaging completed.  09/18/2018 No recent imaging completed.  04/24/2018 Patient presents today for a follow up of her pancreas  divisum which was diagnosed via MRI Abdomen May 29, 2015.  She notes of having low back pain in May 2015 and was seen by her PCP who subsequently ordered an MRI Abdomen w/wo contrast.  (5/29/2015) MR Abdomen w/wo contrast impression: Pancreas divisum with separate drainage of the pancreatic duct and common bile duct ar the duodenum. Mild, diffuse pancreatic ductal dilation without obstructing mass or calculus.;   Dysphagia : She denies any dysphagia at this time and reports that it has resolved.    Last visit (3/9/2021) Denies any episodes of dysphagia since her last office visit.    Last visit (01/12/2021) Denies continued episodes of dysphagia since her last visit.   Last visit (11/24/2020) Patient denies any dysphagia since her last visit.   She denies any episodes dysphagia since her last visit. She does admits that she feels gas at this time and reports that she flatulants often and is unable to control symptoms.    Last visit (9/15/2020) Denies globus or dysphagia.   Last visit (7/28/2020) She has a globus sensation that occur daily.  Denies dysphagia.    Last visit (7/22/2020)  She has a globus sensation since the past three days with regurgitation .   Patient can not recall starting. in her words ( It doesn't matter now ) Famotidine 20 mg po QHS 30-1    She continue to deny episodes of dysphagia Last visit (5/11/2020) She continue to deny episodes of dysphagia  Last visit (3/31/2020) She continue to deny episodes of dyspragia.  Last visit (3/3/2020) Patient denies episodes of dyspragia since last office visit.  Last visit (1/15/2019) Patient denies any episodes of dysphagia since her EGD with dilation was completed.   10/16/18 Patient continues to deny episodes of dysphagia.   09/18/2018 Patient continues to deny episodes of dysphagia.    08/22/2018 Continue to deny dysphagia since EGD w/dilation.    Last visit (7/18/2018) Patient denies recent episodes of dysphagia.  Last visit (6/26/2018) Patient denies recent episodes of dysphagia.  04/24/2018 Continues to have dysphagia with solid food , upper esophagus , few times a week    At last visit (10/31/2017)Difficulty in swallowing since the past year, few times a month associated with dryness of mouth due to Sjogren's She continue to deny episodes of dyspragia.  Last visit (3/3/2020) Patient denies episodes of dyspragia since last office visit.  Last visit (1/15/2019) Patient denies any episodes of dysphagia since her EGD with dilation was completed.   10/16/18 Patient continues to deny episodes of dysphagia.   09/18/2018 Patient continues to deny episodes of dysphagia.    08/22/2018 Continue to deny dysphagia since EGD w/dilation.    Last visit (7/18/2018) Patient denies recent episodes of dysphagia.  Last visit (6/26/2018) Patient denies recent episodes of dysphagia.  04/24/2018 Continues to have dysphagia with solid food , upper esophagus , few times a week    At last visit (10/31/2017)Difficulty in swallowing since the past year, few times a month associated with dryness of mouth due to Sjogren's;   Fatty Liver : Admits the continued use of Vitamin E Capsule, 400 1 QD. She currently denies any jaundice, dizziness, RUQ pain, bloating, easy bruising or chills.    Last visit (3/9/2021) She currently denies any jaundice,  dizziness, RUQ pain, bloating, easy bruising or chills. She admits continued use of Vitamin E 400 mg BID. She admits daily fatigue.    Last visit (01/12/2021) She continue to take Vitamin E 400 mg BID.    Last visit (11/24/2020) She continue to deny  jaundice, chills, RUQ pains, dizziness, or light headedness.   Last visit: (11/3/20) Patient currently denies  jaundice, chills, RUQ pains, dizziness, or easy bruising.   Last visit (9/15/2020) She denies jaundice, chills, RUQ pains, dizziness, or light headedness.  Admit increased fatigue over the past month and was experiencing feeling "lethargic".  She saw her Internist last week and report having an extensive amount of blood work completed, which she is awaiting results.    Last visit (7/22/2020) She continue to deny  jaundice, chills, RUQ pains, dizziness, or light headedness   Last visit (3/31/2020) She continue to deny  jaundice, chills, RUQ pains, dizziness, or light headedness. Admit still feeling fatigue she is unsure if this has to do with her Liver or her Sjogren's Disease.   Last visit (3/3/2020) Patient denies jaundice, chills, RUQ pains, dizziness, or light headedness. Admits still feeling fatigue she is unsure if this has to do with her Liver or her Sjogren's Disease.  Last visit (01/15/2019) Patient currently denies jaundice, chills, RUQ pains, dizziness, or light headedness. Admits still feeling fatigue.    10/16/18 Patient was diagnosed with fatty liver in 2017.  She currently denies jaundice, chills, RUQ pains, dizziness, or light headedness Admit easy bruising over past few months, which is being follow by Hematologist Dr. Hakeem Fallon.  Admit several year history of feeling fatigue.    09/18/2018 Patient was diagnosed with fatty liver in 2017.  She currently denies jaundice, chills, RUQ pains, dizziness, or light headedness Admit easy bruising over past few months, which is being follow by Hematologist Dr. Hakeem Fallon.  Admit several year history of feeling fatigue.   08/22/2018 Patient was diagnosed with a fatty liver in 2017. Most recent labs with PCP Dr. Michelle Naidu a month ago due to skin rash from an allergic reaction from an unknown source. Labs completed (7/12/2018) revealed CBC and CMP within normal limits.   She currently denies jaundice, chills, RUQ pains, dizziness, or light headedness Admit easy bruising over past few months, which is being follow by Hematologist Dr. Hakeem Fallon.  Admit several year history of feeling fatigue.    04/24/2018 Patient was found to have fatty liver via CT abdomen on May 3, 2017.  Most recent labs with PCP Dr. Michelle Naidu  Aug. 23, 2017 at which time patient states her liver enzymes were fine, just had elevated calcium levels.  Patient currently denies jaundice, chills, RUQ pains, dizziness, or light headedness Admit easy bruising over past few months, which is being follow by Hematologist Dr. Hakeem Fallon.  Admit several year history of feeling fatigue.   Outside Labs:  (5/3/2016) Glucose 105 (H), Creatinine 1.16 (H), Protein Total 8.5 (H), ALT 36 (N), AST 20 (WNL), Albumin 4.5 (N), Total Bilirubin 0.6 (N), Total Calcium 10.6 (H), Alkaline Phosphatase 65 (N), Lipase 391 (N), WBC 7.2 (N), RBC 4.98 (N), Hemoglobin 15.4 (N), Hematocrit 44.4 (H), Platelet 345 (N).  (10/31/2017)NAFLD Score: -0.979   &lt; -1.455: predictor of absence of significant fibrosis (F0-F2 fibrosis) ? -1.455 to ? 0.675: indeterminate score > 0.675: predictor of presence of significant fibrosis (F3-F4 fibrosis)     Outside Imaging: (5/3/2016) CT Pelvis with impression: No acute intra-abdominal process. Colonic diverticulosis without diverticulitis. Normal appendix. No bowel obstruction. Persistent prominent appearance of the pancreatic duct measuring up to 3.9 mm in maximal diameter. Fatty liver with hepatomegaly.  (05/15/2015) CT Abdomen and Pelvis with contrast impression: Normal appendix. Descending and sigmoid colon diverticula. No diverticulitis. Borderline pancreatic ductal dilation. No biliary ductal dilation. Further evaluation with MRI of the abdomen and MRCP may be obtained. She continue to deny  jaundice, chills, RUQ pains, dizziness, or light headedness. Admit still feeling fatigue she is unsure if this has to do with her Liver or her Sjrogen's Disease.   Last visit (3/3/2020) Patient denies jaundice, chills, RUQ pains, dizziness, or light headedness. Admits still feeling fatigue she is unsure if this has to do with her Liver or her Sjrogen's Disease.  Last visit (01/15/2019) Patient currently denies jaundice, chills, RUQ pains, dizziness, or light headedness. Admits still feeling fatigue.    10/16/18 Patient was diagnosed with fatty liver in 2017.  She currently denies jaundice, chills, RUQ pains, dizziness, or light headedness Admit easy bruising over past few months, which is being follow by Hematologist Dr. Hakeem Fallon.  Admit several year history of feeling fatigue.    09/18/2018 Patient was diagnosed with fatty liver in 2017.  She currently denies jaundice, chills, RUQ pains, dizziness, or light headedness Admit easy bruising over past few months, which is being follow by Hematologist Dr. Hakeem Fallon.  Admit several year history of feeling fatigue.   08/22/2018 Patient was diagnosed with a fatty liver in 2017. Most recent labs with PCP Dr. Michelle Naidu a month ago due to skin rash from an allergic reaction from an unknown source. Labs completed (7/12/2018) revealed CBC and CMP within normal limits.   She currently denies jaundice, chills, RUQ pains, dizziness, or light headedness Admit easy bruising over past few months, which is being follow by Hematologist Dr. Hakeem Fallon.  Admit several year history of feeling fatigue.    04/24/2018 Patient was found to have fatty liver via CT abdomen on May 3, 2017.  Most recent labs with PCP Dr. Michelle Naidu  Aug. 23, 2017 at which time patient states her liver enzymes were fine, just had elevated calcium levels.  Patient currently denies jaundice, chills, RUQ pains, dizziness, or light headedness Admit easy bruising over past few months, which is being follow by Hematologist Dr. Hakeem Fallon.  Admit several year history of feeling fatigue.   Outside Labs:  (5/3/2016) Glucose 105 (H), Creatinine 1.16 (H), Protein Total 8.5 (H), ALT 36 (N), AST 20 (WNL), Albumin 4.5 (N), Total Bilirubin 0.6 (N), Total Calcium 10.6 (H), Alkaline Phosphatase 65 (N), Lipase 391 (N), WBC 7.2 (N), RBC 4.98 (N), Hemoglobin 15.4 (N), Hematocrit 44.4 (H), Platelet 345 (N).  (10/31/2017)NAFLD Score: -0.979   &lt; -1.455: predictor of absence of significant fibrosis (F0-F2 fibrosis) ? -1.455 to ? 0.675: indeterminate score > 0.675: predictor of presence of significant fibrosis (F3-F4 fibrosis)     Outside Imaging: (5/3/2016) CT Pelvis with impression: No acute intra-abdominal process. Colonic diverticulosis without diverticulitis. Normal appendix. No bowel obstruction. Persistent prominent appearance of the pancreatic duct measuring up to 3.9 mm in maximal diameter. Fatty liver with hepatomegaly.  (05/15/2015) CT Abdomen and Pelvis with contrast impression: Normal appendix. Descending and sigmoid colon diverticula. No diverticulitis. Borderline pancreatic ductal dilation. No biliary ductal dilation. Further evaluation with MRI of the abdomen and MRCP may be obtained.;   Diarrhea : She admits improvement in episodes of diarrhea.   She admits the continued use of Florastor Capsule, 250 MG, 1 capsule, Orally, Twice a day.  Currently reports 2-3 bowel movements a day. Her stools are semi formed without blood, mucus, or melena. She denies any rectal pain or pruritus ani.   Last visit (3/9/2021) Currently reports 2-3 bowel movements per day, without strain. Her stools are normal and formed. Denies any mucus, melena or blood in her stools. Denies any pruritus ani or rectal pain.    Last visit (01/12/2021) Patient presents today for a follow up of bowel habits.  She admit intermittent  episodes of diarrhea during treatment for Diverticulitis, which she was found to have via CT Abdomen at [facility] on (12/31/2020) ordered by Urogynecologist Dr. Matti Gutierrez following c/o severe LLQ abdominal pain.     She was treated with Cipro 500 mg BID and Flagyl 500 mg TID x 10 days. Dr. Gallagher prescribed Dificid 200 mg 1 BID prophylactically for 10 days.    Patient reports the use of Imodium 1 QD during the episodes of diarrhea with improvement of symptoms. Last dose 1-2 weeks ago.  She currently admits 3 semi-formed bowel movements per day. Denies melena, blood or mucus in stools.   Last visit (12/2/2020) Patient denies any episodes of diarrhea.    Last visit: (11/3/20) She denies any episodes of diarrhea. Patient currently admits 1-2 bowel movements a day with no strain. Her stools are formed without blood, mucus, melena, pruritus ani, or rectal pain.    Last visit (9/15/2020) Patient presents today for a follow up of diarrhea.  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  Treatment consisted of Dificid and Zinplava ( Infusion ), which she completed on (8/18/2020) with marked improvement of symptoms.  She report having normal bowel habits over the past month.  Currently admit 2-4 normal and formed bowel movements per day. Denies melena, blood or mucus in stools.     Patient state she had a follow up with Uro-Gynecologist Dr. Holden suggest she had a 2nd opinion with another GI.  Also she did a rectal exam and was told her sphincter muscle was a 5/10.  She is currently taking Dicyclomine HCl 10 MG and Welchol 3 BID.  She report having yellowish orange stool when she have fecal incontinence, which occur randomly.  Last episodes occurred 5 days ago. Denies melena, blood or mucus in stools.      Last visit (7/28/2020) Currently admit 3 normal and formed evacuations and 1 episodes of watery bowel movements.  Admit associated episodes of fecal incontinence.  She continue to take Continue Welchol 625 MG, 3 BID.  She did not submit the stool study as ordered by Dr. Gallagher yesterday.   Last visit (7/22/2020) Patient admits to 5-6 bowel episodes of diarrhea a day.   Of note, patient contact the office on (7/6/2020) stating she completed the Dificid last week and continue to take Welchol 625 MG She denies diarrhea episodes over the past 2 weeks.  Stools are small semi-formed.  Denies straining. She want to discuss fecal transplant via pill form and Interstim.        Last visit (06/24/2020)  Patient presents today for a follow up of diarrhea, review MRI, and QDx results.  Patient was notified on (6/3/2020) QDx stool study was positive for C. Diff, subsequently she was treated with Vancomycin 125 mg po QID for 10 days.  Patient contact the office on (6/15/2020) c/o symptoms are no better. Also continue to experience abdominal pain and fecal incontinence 5+ times a day.    She also want to know if the cholestyramine come in a tablet form, because it say it can damage teeth, which she already has risk of dental damage from her Sjogren's.   Subsequently, patient was treated with Dificid 200 mg po BID for 10 days. Welchol 625 mg III po BID and Imodium - 1-2,  4 times a day prn.  She has not taken Imodium.   She admit bowel movements were getting back to normal last week over the course of 3 days while on a bland diet with having 3 normal formed BM's per day.  While off the diet she would have 3-5 BM's per day with a couple episodes fecal incontinence.  Denies melena, blood or mucus in stools.     Last visit (5/11/2020) Admit 5-6 loose BM's per day with fecal seepage and LLQ abdominal pain. Symptoms were improved then began to increase several weeks ago.  Denies melena, blood or mucus in stools.  Denies pruritus ani, rectal pain or bleeding.    She report maybe 1-2 days a week she will have normal and formed bowel movements.     Last visit (3/31/2020) Admit 5-6 bowel movements per day with 2-3 episodes of diarrhea at least once a day since last office visit.  Denies melena, blood or mucus in stools.  Of note, patient contact the office on (3/13/2020) with c/o 5-6 loose and watery BM's per day and episodes of fecal incontinence since 2 days after her last visit.  Described as full fecal seepage that occur with bending over or walking.    Subsequently a stool study was ordered, treated with Flagyl 500 mg TID, Bactrim DS BID x 10 days, take Imodium prn with caution.  She completed the 10 day course of Flagyl 500 mg TID, Bactrim DS BID on (3/22/2020).   Last visit (3/3/2020)She reports 2-3 bowel movements a day with no strain. She admits her stools are formed with no bleeding, melena, or mucus at this time.   Last visit (1/15/2019) Patient presents today for a follow up of diarrhea. Patient admits rarely experiencing diarrhea since her last office visit. Patient admits 1-3 bowel movements a day. Stools are normal in form.   Patient admits that she is scheduled to see Dr Holden tomorrow.       Patient denies the use of Welchol Tablet, 625 MG, 2 tablets prn with meals, Orally, TID. Patient states that she wanted to clear her body from the medications that she was taking.   Patient denies the use of Dicyclomine HCl Capsule, 10 MG, 1 tablet prn, Orally, TID. Patient states that she felt that this medication wasn't helping her so she stopped taking it.    10/16/18 Patient presents today for a follow up of diarrhea. She states there has been an improvement of symptoms and she admits to having 2-3 bowel movements per day. Ranging from solid to loose stools.   Patient was evaluated by Dr. Holden a Urogynecologist at [facility] who administered a pessary, which is a prosthetic device that can be inserted into the vagina to support its internal structure. She was advised this device tends to improve diarrhea and fecal incontinence in patients that have rectocele. Patient also has started Pelvic Floor Therapy and has completed two sessions.   Of note, patient never started Viberzi Tablet, 75 MG.   09/18/2018 Patient presents today for a follow up of diarrhea. She continues to have episodes of watery/loose stools daily. She states she can have up to 5-6 bowel movements per day. She denies blood, mucous or melena.    08/22/2018 Patient presents today to review QDx stool study, lab results and follow up of diarrhea. She has been taking Welchol 625 MG, 2 BID with some improvement in bowel habits.  Currently admit 2-3 defecations per day.  Stools are 50% of the time normal formed and loose/watery.  Denies melena, blood or mucus in stools.  Denies fecal urgency.  Continue to admit episodes of fecal incontinence.      Last visit (7/18/2018) Patient presents today for a follow up after her colonoscopy, EGD and labs.  She denies any complications after her procedure. She continues to have 3-4 bowel movements per day ranging from solid to watery/loose. She denies mucous or melena. She does admit to occasional bright red blood present on toilet paper following a bowel movement. She denies taking  Imodium A-D or Hyoscyamine.  Last visit (6/26/2018) Patient presents today for a consultation of diarrhea since the past 6 months . She states that symptoms started around June 9 th, 2018 after eating dinner. She was evaluated by [facility] on Hannah 10, 2018 for her symptoms. At that time stool studies were completed. She is currently having approximately five watery/loose stools per day. She denies any blood, mucous or melena. However, patient admits the amount of stools may increase if she consumes more food. She has been avoiding eating because that triggers her to have a bowel movement. She has had an eight pound unintentional weight loss since her last visit on 04/24/2018.    She did have an MRI of the abdomen completed on 04/06/2018 which revealed subtle sludge or concentrated bile within the gallbladder.  Per patient her last colonoscopy was 2-3 years ago. She denies having colon polyps at that time.  Of, note patient was prescribed Keflex on May 20, 2018. Admit 5-6 bowel movements per day with 2-3 episodes of diarrhea at least once a day since last office visit.  Denies melena, blood or mucus in stools.  Of note, patient contact the office on (3/13/2020) with c/o 5-6 loose and watery BM's per day and episodes of fecal incontinence since 2 days after her last visit.  Described as full fecal seepage that occur with bending over or walking.    Subsequently a stool study was ordered, treated with Flagyl 500 mg TID, Bactrim DS BID x 10 days, take Imodium prn with caution.  She completed the 10 day course of Flagyl 500 mg TID, Bactrim DS BID on (3/22/2020).   Last visit (3/3/2020)She reports 2-3 bowel movements a day with no strain. She admits her stools are formed with no bleeding, melena, or mucus at this time.   Last visit (1/15/2019) Patient presents today for a follow up of diarrhea. Patient admits rarely experiencing diarrhea since her last office visit. Patient admits 1-3 bowel movements a day. Stools are normal in form.   Patient admits that she is scheduled to see Dr Holden tomorrow.       Patient denies the use of Welchol Tablet, 625 MG, 2 tablets prn with meals, Orally, TID. Patient states that she wanted to clear her body from the medications that she was taking.   Patient denies the use of Dicyclomine HCl Capsule, 10 MG, 1 tablet prn, Orally, TID. Patient states that she felt that this medication wasn't helping her so she stopped taking it.    10/16/18 Patient presents today for a follow up of diarrhea. She states there has been an improvement of symptoms and she admits to having 2-3 bowel movements per day. Ranging from solid to loose stools.   Patient was evaluated by Dr. Holden a Urogynecologist at [facility] who administered a pessary, which is a prosthetic device that can be inserted into the vagina to support its internal structure. She was advised this device tends to improve diarrhea and fecal incontinence in patients that have rectocele. Patient also has started Pelvic Floor Therapy and has completed two sessions.   Of note, patient never started Viberzi Tablet, 75 MG.   09/18/2018 Patient presents today for a follow up of diarrhea. She continues to have episodes of watery/loose stools daily. She states she can have up to 5-6 bowel movements per day. She denies blood, mucous or melena.    08/22/2018 Patient presents today to review QDx stool study, lab results and follow up of diarrhea. She has been taking Welchol 625 MG, 2 BID with some improvement in bowel habits.  Currently admit 2-3 defecations per day.  Stools are 50% of the time normal formed and loose/watery.  Denies melena, blood or mucus in stools.  Denies fecal urgency.  Continue to admit episodes of fecal incontinence.      Last visit (7/18/2018) Patient presents today for a follow up after her colonoscopy, EGD and labs.  She denies any complications after her procedure. She continues to have 3-4 bowel movements per day ranging from solid to watery/loose. She denies mucous or melena. She does admit to occasional bright red blood present on toilet paper following a bowel movement. She denies taking  Imodium A-D or Hyoscyamine.  Last visit (6/26/2018) Patient presents today for a consultation of diarrhea since the past 6 months . She states that symptoms started around June 9 th, 2018 after eating dinner. She was evaluated by [facility] on Hannah 10, 2018 for her symptoms. At that time stool studies were completed. She is currently having approximately five watery/loose stools per day. She denies any blood, mucous or melena. However, patient admits the amount of stools may increase if she consumes more food. She has been avoiding eating because that triggers her to have a bowel movement. She has had an eight pound unintentional weight loss since her last visit on 04/24/2018.    She did have an MRI of the abdomen completed on 04/06/2018 which revealed subtle sludge or concentrated bile within the gallbladder.  Per patient her last colonoscopy was 2-3 years ago. She denies having colon polyps at that time.  Of, note patient was prescribed Keflex on May 20, 2018.;   Elevated Liver Enzymes : Patient's last labs were completed on 6/22/2021 and was dx'd with Metabolic Syndrome. She currently denies any jaundice, dizziness, RUQ pain, bloating, easy bruising or chills. She admits daily fatigue.     Last visit (3/9/2021) Most recent labs were completed 12/2020. She currently denies any jaundice, dizziness, RUQ pain, bloating, easy bruising or chills. She admits daily fatigue.    Last visit (01/12/2021) CBC is the most recent labs completed on (12/11/2020) at [facility].   Currently denies  jaundice, chills, RUQ pains, dizziness, or easy bruising.  Admits to having ocassional episodes of gas.    Last visit (11/24/2020) 61 Y/O female presents today to review labs and follow up for elevated liver enzymes.  Labs completed on (11/10/2020) CMP: ALT (High) 63. Hepatic Function Panel: ALT (High) 69.   Currently denies  jaundice, chills, RUQ pains, dizziness, or easy bruising.  Admits to having ocassional episodes of gas.   Last visit: (11/03/20) Patient presents today for a consultation of elevated liver enzymes, which were found via labs with PCP, CMP: AST (High) 75 and ALT (High) 154  Patient denies a history of elevated liver enzymes.  Patient currently denies  jaundice, chills, RUQ pains, dizziness, or easy bruising. She admits fatigue but associate symptoms with Sjogren's Disease.   RISK FACTORS: Denies Admits Alcohol, drugs, travel outside the [place], NSAID's, protein supplements, tattoos, piercing's,  service, blood transfusion, family history of liver disease or cancer, personal exposure to liver diseases, correction, rehab   Versepa 1 mg 2 tabs in AM and 2 tabs in PM Mybetriq 50 mg once at bedtime;   Hospital Follow Up : Denies any ER visits or hospital stays since her last visit.    Last visit (3/9/2021) Denies any ER visits or hospital stays since her last visit.    Last visit (11/24/2020) Patient denies any recent hospitalization since last visit.   Last visit (9/15/2020)Denies any hospitalization since last visit.   Last visit (7/28/2020) Patient presented to [facility] on (7/23/2020) at the advise of our office due to uncontrolled severe abdominal pain.    CT Abd/Pelvis with contrast was performed revealing No findings to account for abdominal pain   Patient was discharged (7/23/2020) after labs, ct reviewed. Dr. Shah, radiology consulted advising mesenteric vessels unremarkable. Based on results, pt will be d/c with outpt follow up to ob/gyn and GI, Dr Ivory.     Last visit (7/22/2020)  Denies any hospitalization since last visit.  Last visit (3/31/2020) Denies any hospitalization since last visit.   Patient presented to [facility] ED June 22, 2019 with abdominal pain.  Onset 5 days prior to ER.  Described as a constant pain located right abdomen and flank area.  Denies any aggravating factors. Denies any hospitalization since.     Patient presented to [facility] ED June 22, 2019 with abdominal pain.  Onset 5 days prior to ER.  Described as a constant pain located right abdomen and flank area.  Denies any aggravating factors.;   Enterocolitis : 61-Year-old female who presents today for a follow up of entercolitis. Patient denies any associated symptoms at this time.      Last visit (3/9/2021) 61-Year-old female patient presents today for a 2 month follow up of entercolitis. She denies any cramping/abdominal pain, fecal urgency/incontinence, fever, diarrhea. She admits daily fatigue. She reports starting Florastor 240 mg 1 BID with  improvement of symptoms.   She denies following up with Dr. Gallagher. Per Dr. Gallagher's office she was last seen 07/2020.     Of note:  Patient called the office 01/21/2021 reporting 3 BMs today with loose and watery stools after eating a salad she denies any blood or mucus present. She admits  x2 days. She c/o bloating/gas, she admits her stomach is distended and she looks "like she is 4 months pregnant." She also c/o a globus sensation and a burning on her tongue as well as a yellow color to her tongue.  She was advised to be on liquids  and soft diet for a few days. Pepcid 20 mg po qd for 14 days . Bentyl 10 mg po TID prn for 10 days.;

## 2021-09-15 ENCOUNTER — TELEPHONE ENCOUNTER (OUTPATIENT)
Dept: URBAN - METROPOLITAN AREA CLINIC 35 | Facility: CLINIC | Age: 61
End: 2021-09-15

## 2022-01-11 ENCOUNTER — OFFICE VISIT (OUTPATIENT)
Dept: URBAN - METROPOLITAN AREA CLINIC 35 | Facility: CLINIC | Age: 62
End: 2022-01-11
Payer: COMMERCIAL

## 2022-01-11 VITALS
DIASTOLIC BLOOD PRESSURE: 64 MMHG | HEIGHT: 64 IN | SYSTOLIC BLOOD PRESSURE: 110 MMHG | OXYGEN SATURATION: 98 % | BODY MASS INDEX: 27.14 KG/M2 | WEIGHT: 159 LBS | HEART RATE: 89 BPM

## 2022-01-11 DIAGNOSIS — R10.31 RIGHT LOWER QUADRANT PAIN: ICD-10-CM

## 2022-01-11 DIAGNOSIS — K76.0 FATTY (CHANGE OF) LIVER, NOT ELSEWHERE CLASSIFIED: ICD-10-CM

## 2022-01-11 DIAGNOSIS — A04.72 ENTEROCOLITIS DUE TO CLOSTRIDIUM DIFFICILE, NOT SPECIFIED AS RECURRENT: ICD-10-CM

## 2022-01-11 DIAGNOSIS — R14.3 FLATULENCE: ICD-10-CM

## 2022-01-11 PROBLEM — 301754002 RIGHT LOWER QUADRANT PAIN: Status: ACTIVE | Noted: 2020-07-26

## 2022-01-11 PROCEDURE — 99213 OFFICE O/P EST LOW 20 MIN: CPT | Performed by: INTERNAL MEDICINE

## 2022-01-11 RX ORDER — HYDROXYCHLOROQUINE SULFATE 200 MG/1
1 TABLET WITH FOOD OR MILK TABLET ORAL ONCE A DAY
Status: ACTIVE | COMMUNITY

## 2022-01-11 RX ORDER — HYDROXYCHLOROQUINE SULFATE 200 MG/1
1 TABLET WITH FOOD OR MILK TABLET ORAL TWICE A DAY
Status: ON HOLD | COMMUNITY

## 2022-01-11 RX ORDER — PRASTERONE 6.5 MG/1
1 VAGINAL _INSERT AT BEDTIME INSERT VAGINAL ONCE A DAY
Qty: 30 | Status: ON HOLD | COMMUNITY

## 2022-01-11 RX ORDER — MIRABEGRON 50 MG/1
1 TABLET TABLET, FILM COATED, EXTENDED RELEASE ORAL ONCE A DAY
Qty: 30 | Status: ACTIVE | COMMUNITY

## 2022-01-11 RX ORDER — CIPROFLOXACIN HCL 500 MG
1 TABLET TABLET ORAL
Qty: 20 | Refills: 0 | Status: ON HOLD | COMMUNITY
Start: 2020-03-12

## 2022-01-11 RX ORDER — NIFEDIPINE
0.3% AS DIRECTED POWDER (GRAM) MISCELLANEOUS TID
Qty: 30 GRAM | Refills: 0 | Status: ON HOLD | COMMUNITY
Start: 2021-06-03

## 2022-01-11 RX ORDER — VORTIOXETINE 20 MG/1
1 TABLET TABLET, FILM COATED ORAL ONCE A DAY
Status: ACTIVE | COMMUNITY

## 2022-01-11 RX ORDER — METRONIDAZOLE 500 MG/1
1 TABLET TABLET, FILM COATED ORAL THREE TIMES A DAY
Qty: 30 | Refills: 0 | Status: ON HOLD | COMMUNITY
Start: 2020-03-12

## 2022-01-11 RX ORDER — PRAVASTATIN SODIUM 20 MG/1
1 TABLET TABLET ORAL ONCE A DAY
Status: ACTIVE | COMMUNITY

## 2022-01-11 RX ORDER — ALPRAZOLAM 2 MG/1
1 TABLET TABLET, EXTENDED RELEASE ORAL TWICE A DAY
Status: ACTIVE | COMMUNITY

## 2022-01-11 RX ORDER — SPIRONOLACTONE 25 MG/1
1 TABLET TABLET ORAL ONCE A DAY
Status: ACTIVE | COMMUNITY

## 2022-01-11 RX ORDER — COLESEVELAM HYDROCHLORIDE 625 MG/1
3 TABLETS WITH MEALS TABLET, FILM COATED ORAL TWICE A DAY
Status: ON HOLD | COMMUNITY
Start: 2020-06-15

## 2022-01-11 RX ORDER — ESOMEPRAZOLE MAGNESIUM 40 MG/1
1 CAPSULE CAPSULE, DELAYED RELEASE ORAL ONCE A DAY
Status: ON HOLD | COMMUNITY

## 2022-01-11 RX ORDER — MELOXICAM 15 MG
1 TABLET TABLET ORAL TWICE A DAY
Status: ON HOLD | COMMUNITY

## 2022-01-11 RX ORDER — SACCHAROMYCES BOULARDII 50 MG
1 CAPSULE CAPSULE ORAL TWICE A DAY
Status: ACTIVE | COMMUNITY

## 2022-01-11 RX ORDER — NITROFURANTOIN MONOHYDRATE/MACROCRYSTALLINE 25; 75 MG/1; MG/1
1 CAPSULE AT BEDTIME WITH FOOD CAPSULE ORAL ONCE A DAY
Qty: 30 | Status: ON HOLD | COMMUNITY

## 2022-01-11 RX ORDER — TRAMADOL HYDROCHLORIDE 50 MG/1
TABLET, FILM COATED ORAL TWICE DAILY
Status: ON HOLD | COMMUNITY

## 2022-01-11 RX ORDER — LEVOMEFOLATE/ALGAL OIL 15-90.314
1 CAPSULE CAPSULE ORAL ONCE A DAY
Status: ACTIVE | COMMUNITY

## 2022-01-11 RX ORDER — MULTIVIT-MIN/IRON/FOLIC ACID/K 18-600-40
AS DIRECTED CAPSULE ORAL
Status: ACTIVE | COMMUNITY

## 2022-01-11 RX ORDER — PNV NO.95/FERROUS FUM/FOLIC AC 28MG-0.8MG
3 TABLETS IN AM TABLET ORAL ONCE A DAY
Status: ACTIVE | COMMUNITY

## 2022-01-11 RX ORDER — SULFAMETHOXAZOLE AND TRIMETHOPRIM 800; 160 MG/1; MG/1
1 TABLET TABLET ORAL TWICE A DAY
Qty: 20 | Refills: 0 | Status: ON HOLD | COMMUNITY
Start: 2020-03-13

## 2022-01-11 RX ORDER — CHOLESTYRAMINE 4 G/9G
1 PACKET MIXED WITH WATER OR NON-CARBONATED DRINK POWDER, FOR SUSPENSION ORAL TWICE A DAY
Qty: 60 | Refills: 1 | Status: ON HOLD | COMMUNITY
Start: 2020-06-11

## 2022-01-11 RX ORDER — RIZATRIPTAN BENZOATE 5 MG/1
1 TABLET ON THE TONGUE AND ALLOW TO DISSOLVE AS NEEDED ONE TIME TABLET, ORALLY DISINTEGRATING ORAL ONCE A DAY
Status: ON HOLD | COMMUNITY

## 2022-01-11 RX ORDER — MESALAMINE 1000 MG/1
1 SUPPOSITORY SUPPOSITORY RECTAL BID
Qty: 60 | Refills: 1 | Status: ON HOLD | COMMUNITY
Start: 2018-07-13

## 2022-01-11 RX ORDER — CIPROFLOXACIN 500 MG/1
TAKE ONE TABLET BY MOUTH EVERY 12 HOURS FOR 10 DAYS TABLET, FILM COATED ORAL
Qty: 20 UNSPECIFIED | Refills: 0 | Status: ON HOLD | COMMUNITY

## 2022-01-11 RX ORDER — MAGNESIUM OXIDE/MAG AA CHELATE 300 MG
1 CAPSULE WITH A MEAL CAPSULE ORAL ONCE A DAY
Status: ACTIVE | COMMUNITY

## 2022-01-11 RX ORDER — ICOSAPENT ETHYL 1000 MG/1
2 CAPSULES WITH MEALS CAPSULE ORAL TWICE A DAY
Qty: 120 | Status: ACTIVE | COMMUNITY

## 2022-01-11 RX ORDER — HYDROCORTISONE ACETATE 0.5 %
AS DIRECTED CREAM (GRAM) TOPICAL
Status: ACTIVE | COMMUNITY

## 2022-01-11 RX ORDER — B-COMPLEX WITH VITAMIN C
1 TABLET TABLET ORAL ONCE A DAY
Status: ACTIVE | COMMUNITY

## 2022-01-11 RX ORDER — GABAPENTIN 100 MG/1
1 CAPSULE CAPSULE ORAL ONCE A DAY
Status: ON HOLD | COMMUNITY

## 2022-01-11 RX ORDER — SACCHAROMYCES BOULARDII 50 MG
1 CAPSULE CAPSULE ORAL TWICE A DAY
OUTPATIENT

## 2022-01-11 RX ORDER — NEBIVOLOL HYDROCHLORIDE 5 MG/1
1 TABLET TABLET ORAL ONCE A DAY
Status: ACTIVE | COMMUNITY

## 2022-01-11 RX ORDER — HYOSCYAMINE SULFATE 0.125 MG
1 TABLET AS NEEDED TABLET ORAL
Qty: 60 TABLET | Refills: 1 | Status: ON HOLD | COMMUNITY
Start: 2018-06-27

## 2022-01-11 RX ORDER — FENUGREEK SEED/BL.THISTLE/ANIS 340 MG
AS DIRECTED CAPSULE ORAL
Status: ACTIVE | COMMUNITY

## 2022-01-11 RX ORDER — MONTELUKAST SODIUM 10 MG/1
1 TABLET IN THE EVENING TABLET, FILM COATED ORAL ONCE A DAY
Status: ACTIVE | COMMUNITY

## 2022-01-11 RX ORDER — VANCOMYCIN HYDROCHLORIDE 125 MG/1
1 CAPSULE CAPSULE ORAL
Qty: 40 | Refills: 0 | Status: ON HOLD | COMMUNITY
Start: 2020-06-03

## 2022-01-11 RX ORDER — BUPROPION HYDROCHLORIDE 300 MG/1
1 TABLET IN THE MORNING TABLET, EXTENDED RELEASE ORAL ONCE A DAY
Status: ACTIVE | COMMUNITY

## 2022-01-11 RX ORDER — ELUXADOLINE 75 MG/1
1 TABLET WITH FOOD TABLET, FILM COATED ORAL TWICE A DAY
Status: ON HOLD | COMMUNITY
Start: 2018-09-18

## 2022-01-11 RX ORDER — ZOLPIDEM TARTRATE 5 MG/1
1 TABLET AT BEDTIME TABLET, FILM COATED ORAL ONCE A DAY
Status: ON HOLD | COMMUNITY
Start: 2021-01-12

## 2022-01-11 RX ORDER — METRONIDAZOLE 500 MG/1
TAKE ONE TABLET BY MOUTH EVERY 8 HOURS FOR 10 DAYS TABLET, FILM COATED ORAL
Qty: 30 UNSPECIFIED | Refills: 0 | Status: ON HOLD | COMMUNITY

## 2022-01-11 RX ORDER — CELECOXIB 100 MG/1
1 CAPSULE WITH FOOD CAPSULE ORAL TWICE A DAY
Qty: 28 CAPSULE | Refills: 0 | Status: ON HOLD | COMMUNITY
Start: 2020-07-24

## 2022-01-11 RX ORDER — FIDAXOMICIN 200 MG/1
1 TABLET TABLET, FILM COATED ORAL TWICE A DAY
Qty: 20 | Refills: 0 | Status: ON HOLD | COMMUNITY
Start: 2020-06-15

## 2022-01-11 NOTE — HPI-ABDOMINAL PAIN
She denies any abdominal pain since her last visit.    Last visit (7/13/2021) Patient reports that the abdominal pain has resolved at this time.         Last visit (3/9/2021) Denies any episodes of abdominal pain since her last office visit.         Last visit (01/12/2021)        Patient was diagnosed with Diverticulitis via CT Abdomen at [facility] on (12/31/2020) ordered by Urogynecologist Dr. Matti Gutierrez following c/o severe LLQ abdominal pain. Subsequently she was treated with Flagyl 500 mg 1 TID and Cipro BID x 10 days. Dr. Gallagher added prophylactic treatment with Dificid 200 mg 1 BID. She completed the antibiotics yesterday.        She continue to admit intermittent LLQ abdominal pain, which she isn't sure if it's post op pain from her Laparoscopic supracervical hysterectomy and bilateral salpingectomy, and Laparoscopic sacrocervicopexy with Restorelle "Y"-shaped mesh performed on (12/14/2020).        Last visit (11/24/2020)        Patient admits that her abdominal pain has resolved.         Last visit (9/15/2020) Abdominal pain episode after consuming pizza and banana pepper. Symptoms last for a few minutes then dissipate on it's own.         Last visit (7/28/2020)        Patient presents today via televisit with consent for a follow up of abdominal pain. Pain is located just below and periumbilical. Described as a dull ache that occur daily in an intermittent pattern.        She will wake up with diffuse lower back pain and abdominal pain. Back pain gradually dissipate over the course of an hour, but the abdominal pain continue to persist.        She report no clear aggravating factor, but occasionally will get relief with laying on her right side.         She is taking Dicyclomine 10 mg 1 TID without significant benefit.        She did not try the Celebrex after reading the warning insert.         She has an appt. with her Urogynecologist on Friday.        Of note, patient presented to [facility] on (7/23/2020) at the advise of our office due to uncontrolled severe abdominal pain.        Last visit (7/22/2020) Of note, patient contact the office on (7/6/2020) stating she completed the Dificid last week and continue to take Welchol 625 MG and hyoscyamine, but continue to have lower abdominal pain. Pain is constant and described as a cramping pain. She report the hyoscyamine is not as effective.         Symptoms start upon waking up in the mornings and persist throughout the day. Patient state "something's got to give". Subsequently she was advised to         Stop Hyoscyamine and start Dicyclomine 10 mg po QID prn         Patient reports Dicyclomine 10 mg po QID prn is not effective.         Last visit (06/24/2020) Patient contact the office on (5/15/2020) c/o continue to experience abdominal pain and fecal incontinence 5+ times a day.         She admit lower abdominal pain below the umbilicus. Symptoms are present most morning upon waking up and will persist throughout the day. She has been taking Hyoscyamine 2-3 times a day with temporary relief.

## 2022-01-11 NOTE — HPI-ABNORMAL FINDINGS
Denies completing any imaging since her last visit.    Last visit (7/13/2021)  She denies any imaging completed since her last visit.          Visit (01/12/2021)        Patient recently had a CT completed revealing Diverticulitis. She has been treated with Cipro 500mg, Flagyl 500mg, and Dificid (prophylactically) x 10 days.  Visit (1/15/2019) Patient admits that she has had a heart test completed as well as a MRI of her neck completed in December and she has not yet received the results.    Visit ( 4/24/2018)        Patient presents today for a follow up of her pancreas divisum which was diagnosed via MRI Abdomen May 29, 2015. She notes of having low back pain in May 2015 and was seen by her PCP who subsequently ordered an MRI Abdomen w/wo contrast.          (5/29/2015) MR Abdomen w/wo contrast impression: Pancreas divisum with separate drainage of the pancreatic duct and common bile duct ar the duodenum. Mild, diffuse pancreatic ductal dilation without obstructing mass or calculus.    Visit (1/15/2019) Patient admits that she has had a heart test completed as well as a MRI of her neck completed in December and she has not yet received the results.      Visit ( 04/24/2018)        Patient presents today for a follow up of her pancreas divisum which was diagnosed via MRI Abdomen May 29, 2015. She notes of having low back pain in May 2015 and was seen by her PCP who subsequently ordered an MRI Abdomen w/wo contrast.         (5/29/2015) MR Abdomen w/wo contrast impression: Pancreas divisum with separate drainage of the pancreatic duct and common bile duct ar the duodenum. Mild, diffuse pancreatic ductal dilation without obstructing mass or calculus.

## 2022-01-11 NOTE — HPI-GAS
Patient reports that she continues to experience gas. She admits episodes of gas she reports that gas is passable via flatulence.   She denies trying anything OTC to help relief symptoms.   Last visit (7/13/2021)   She admits improvement in episodes of gas but admits symptoms still remain. Patient reports that she is able to pass the gas via flatulence.         Last visit (3/9/2021) Denies any episodes of flatulence or belching since her last office visit.         Last visit (01/12/2021)        Denies any abnormal amounts of gas since her last visit.         Last visit (11/24/2020)        Patient states she has experienced some gas throughout the day. Admits she has been able to pass the gas. Denies any OTC medications for relief.

## 2022-01-11 NOTE — HPI-HOSPITAL FOLLOW-UP
She denies any ER visits or hospital stays since her last visit.     Last visit (7/13/2021)  Denies any ER visits or hospital stays since her last visit.         Last visit (3/9/2021) Denies any ER visits or hospital stays since her last visit.         Last visit (11/24/2020)        Patient denies any recent hospitalization since last visit.        Last visit (9/15/2020)Denies any hospitalization since last visit.         Last visit (7/28/2020)        Patient presented to [facility] on (7/23/2020) at the advise of our office due to uncontrolled severe abdominal pain.         CT Abd/Pelvis with contrast was performed revealing No findings to account for abdominal pain        Patient was discharged (7/23/2020) after labs, ct reviewed. Dr. Shah, radiology consulted advising mesenteric vessels unremarkable. Based on results, pt will be d/c with outpt follow up to ob/gyn and GI, Dr Ivory.        Last visit (7/22/2020) Denies any hospitalization since last visit.        Last visit (3/31/2020) Denies any hospitalization since last visit.        Patient presented to [facility] ED June 22, 2019 with abdominal pain. Onset 5 days prior to ER. Described as a constant pain located right abdomen and flank area. Denies any aggravating factors. Denies any hospitalization since.        Patient presented to [facility] ED June 22, 2019 with abdominal pain. Onset 5 days prior to ER. Described as a constant pain located right abdomen and flank area. Denies any aggravating factors.

## 2022-01-11 NOTE — HPI-FATTY LIVER
She denies any associated symptoms at this time.    Last visit (7/13/2021)  Admits the continued use of Vitamin E Capsule, 400 1 QD. She currently denies any jaundice, dizziness, RUQ pain, bloating, easy bruising or chills.  Visit (3/9/2021) She currently denies any jaundice, dizziness, RUQ pain, bloating, easy bruising or chills. She admits continued use of Vitamin E 400 mg BID. She admits daily fatigue.  Visit (04/24/2018)        Patient was found to have fatty liver via CT abdomen on May 3, 2017. Most recent labs with PCP Dr. Michelle Naidu Aug. 23, 2017 at which time patient states her liver enzymes were fine, just had elevated calcium levels.        Patient currently denies jaundice, chills, RUQ pains, dizziness, or light headedness Admit easy bruising over past few months, which is being follow by Hematologist Dr. Hakeem Fallon. Admit several year history of feeling fatigue.         Outside Labs:  (5/3/2016) Glucose 105 (H), Creatinine 1.16 (H), Protein Total 8.5 (H), ALT 36 (N), AST 20 (WNL), Albumin 4.5 (N), Total Bilirubin 0.6 (N), Total Calcium 10.6 (H), Alkaline Phosphatase 65 (N), Lipase 391 (N), WBC 7.2 (N), RBC 4.98 (N), Hemoglobin 15.4 (N), Hematocrit 44.4 (H), Platelet 345 (N).        (10/31/2017)NAFLD Score: -0.979         < -1.455: predictor of absence of significant fibrosis (F0-F2 fibrosis)        ? -1.455 to ? 0.675: indeterminate score        > 0.675: predictor of presence of significant fibrosis (F3-F4 fibrosis)         Outside Imaging:        (5/3/2016) CT Pelvis with impression: No acute intra-abdominal process. Colonic diverticulosis without diverticulitis. Normal appendix. No bowel obstruction.        Persistent prominent appearance of the pancreatic duct measuring up to 3.9 mm in maximal diameter. Fatty liver with hepatomegaly.        (05/15/2015) CT Abdomen and Pelvis with contrast impression: Normal appendix. Descending and sigmoid colon diverticula. No diverticulitis. Borderline pancreatic ductal dilation. No biliary ductal dilation. Further evaluation with MRI of the abdomen and MRCP may be obtained. Abnormal MRI:

## 2022-01-11 NOTE — HPI-ELEVATED LIVER ENZYMES
She denies any associated symptoms at this time. Patient reports her last labs were completed in [facility]. She admits that she will see her internist within the next 1-2 weeks and labs will be drawn at that time.     Last visit (7/13/2021)  Patient's last labs were completed on 6/22/2021 and was dx'd with Metabolic Syndrome. She currently denies any jaundice, dizziness, RUQ pain, bloating, easy bruising or chills. She admits daily fatigue.         Last visit (3/9/2021) Most recent labs were completed 12/2020. She currently denies any jaundice, dizziness, RUQ pain, bloating, easy bruising or chills. She admits daily fatigue.         Last visit (01/12/2021)        CBC is the most recent labs completed on (12/11/2020) at [facility].         Currently denies jaundice, chills, RUQ pains, dizziness, or easy bruising.         Admits to having ocassional episodes of gas.        Last visit (11/24/2020)        61 Y/O female presents today to review labs and follow up for elevated liver enzymes. Labs completed on (11/10/2020) CMP: ALT (High) 63. Hepatic Function Panel: ALT (High) 69.         Currently denies jaundice, chills, RUQ pains, dizziness, or easy bruising.         Admits to having ocassional episodes of gas.        Last visit: (11/03/20)        Patient presents today for a consultation of elevated liver enzymes, which were found via labs with PCP, CMP: AST (High) 75 and ALT (High) 154        Patient denies a history of elevated liver enzymes.        Patient currently denies jaundice, chills, RUQ pains, dizziness, or easy bruising. She admits fatigue but associate symptoms with Sjogren's Disease.         RISK FACTORS: Denies        Admits Alcohol, drugs, travel outside the [place], NSAID's, protein supplements, tattoos, piercing's,  service, blood transfusion, family history of liver disease or cancer, personal exposure to liver diseases, [facility], rehab        Versepa 1 mg 2 tabs in AM and 2 tabs in PM        Mybetriq 50 mg once at bedtime.

## 2022-01-11 NOTE — HPI-GI BLEEDING/RECTAL PAIN
Denies any episodes of rectal pain.   Last visit (71/3/2021) She denies any rectal pain at this time.         Last visit (3/9/2021)         Patient states she was diagnosed with Proctalgia fugax several years ago and was treated by a doctor in [place] years ago with a numbing ointment that helped. She c/o recent onset flare up with severe stabing pain around her rectum and anus that will occasionally last for 30 minutes at a time. Pain usualy begin at night. Patient would like to know if Dr Ivory will sent Rx for the ointment.         Patient state flare up began a few weeks ago. She admit being under alot of stress lately that could be the cause of the flare up. Symptoms only occur nocturnally.Subsequently, she was treated with Nifidipine 0.3 % TID for 4 weeks 30 gm

## 2022-01-11 NOTE — HPI-CHANGE IN BOWEL HABITS
Patient presents today for a follow up of Enterocolitis due to Clostridium difficile.  She continue the use of Floraster 250 mg 1 BID. She reports that her bowel habits have returned to normal.   Currently she reports 1-3 bowel movements a day without strain. Her stools are formed without blood, mucus, or melena. She denies any rectal pain or pruritus ani.    Last visit (7/13/2021) 61-Year-old female who presents today for a follow up of entercolitis. Patient denies any associated symptoms at this time.         Last visit (3/9/2021) 61-Year-old female patient presents today for a 2 month follow up of entercolitis. She denies any cramping/abdominal pain, fecal urgency/incontinence, fever, diarrhea. She admits daily fatigue. She reports starting Florastor 240 mg 1 BID with improvement of symptoms.         She denies following up with Dr. Gallagher. Per Dr. Gallagher's office she was last seen 07/2020.         Of note:         Patient called the office 01/21/2021 reporting 3 BMs today with loose and watery stools after eating a salad she denies any blood or mucus present. She admits x2 days. She c/o bloating/gas, she admits her stomach is distended and she looks "like she is 4 months pregnant." She also c/o a globus sensation and a burning on her tongue as well as a yellow color to her tongue.         She was advised to be on liquids and soft diet for a few days. Pepcid 20 mg po qd for 14 days . Bentyl 10 mg po TID prn for 10 days. Diarrhea:          She admits improvement in episodes of diarrhea.         She admits the continued use of Florastor Capsule, 250 MG, 1 capsule, Orally, Twice a day.        Currently reports 2-3 bowel movements a day. Her stools are semi formed without blood, mucus, or melena. She denies any rectal pain or pruritus ani.         Last visit (3/9/2021) Currently reports 2-3 bowel movements per day, without strain. Her stools are normal and formed. Denies any mucus, melena or blood in her stools. Denies any pruritus ani or rectal pain.         Last visit (01/12/2021)        Patient presents today for a follow up of bowel habits. She admit intermittent episodes of diarrhea during treatment for Diverticulitis, which she was found to have via CT Abdomen at [facility] on (12/31/2020) ordered by Urogynecologist Dr. Matti Gutierrez following c/o severe LLQ abdominal pain.         She was treated with Cipro 500 mg BID and Flagyl 500 mg TID x 10 days. Dr. Gallagher prescribed Dificid 200 mg 1 BID prophylactically for 10 days.         Patient reports the use of Imodium 1 QD during the episodes of diarrhea with improvement of symptom

## 2023-01-17 ENCOUNTER — OFFICE VISIT (OUTPATIENT)
Dept: URBAN - METROPOLITAN AREA CLINIC 35 | Facility: CLINIC | Age: 63
End: 2023-01-17

## 2023-02-21 ENCOUNTER — WEB ENCOUNTER (OUTPATIENT)
Dept: URBAN - METROPOLITAN AREA CLINIC 35 | Facility: CLINIC | Age: 63
End: 2023-02-21

## 2023-02-21 ENCOUNTER — OFFICE VISIT (OUTPATIENT)
Dept: URBAN - METROPOLITAN AREA CLINIC 35 | Facility: CLINIC | Age: 63
End: 2023-02-21
Payer: COMMERCIAL

## 2023-02-21 ENCOUNTER — DASHBOARD ENCOUNTERS (OUTPATIENT)
Age: 63
End: 2023-02-21

## 2023-02-21 VITALS
HEART RATE: 86 BPM | HEIGHT: 64 IN | WEIGHT: 164.4 LBS | OXYGEN SATURATION: 95 % | DIASTOLIC BLOOD PRESSURE: 78 MMHG | BODY MASS INDEX: 28.07 KG/M2 | SYSTOLIC BLOOD PRESSURE: 122 MMHG

## 2023-02-21 DIAGNOSIS — R19.4 CHANGE IN BOWEL HABIT: ICD-10-CM

## 2023-02-21 DIAGNOSIS — R74.8 ELEVATED LIVER ENZYMES: ICD-10-CM

## 2023-02-21 DIAGNOSIS — K76.0 FATTY (CHANGE OF) LIVER, NOT ELSEWHERE CLASSIFIED: ICD-10-CM

## 2023-02-21 DIAGNOSIS — Z12.12 ENCOUNTER FOR SCREENING FOR MALIGNANT NEOPLASM OF RECTUM: ICD-10-CM

## 2023-02-21 DIAGNOSIS — R14.3 FLATULENCE: ICD-10-CM

## 2023-02-21 DIAGNOSIS — A04.72 ENTEROCOLITIS DUE TO CLOSTRIDIUM DIFFICILE, NOT SPECIFIED AS RECURRENT: ICD-10-CM

## 2023-02-21 DIAGNOSIS — Z12.11 ENCOUNTER FOR SCREENING FOR MALIGNANT NEOPLASM OF COLON: ICD-10-CM

## 2023-02-21 DIAGNOSIS — R10.84 GENERALIZED ABDOMINAL PAIN: ICD-10-CM

## 2023-02-21 PROBLEM — 102614006 GENERALIZED ABDOMINAL PAIN: Status: ACTIVE | Noted: 2017-10-31

## 2023-02-21 PROBLEM — 305058001: Status: ACTIVE | Noted: 2023-02-21

## 2023-02-21 PROBLEM — 249504006: Status: ACTIVE | Noted: 2022-01-11

## 2023-02-21 PROBLEM — 197321007 FATTY LIVER: Status: ACTIVE | Noted: 2017-10-31

## 2023-02-21 PROBLEM — 707724006 ELEVATED LIVER ENZYMES LEVEL: Status: ACTIVE | Noted: 2023-02-21

## 2023-02-21 PROBLEM — 88111009 CHANGE IN BOWEL HABIT: Status: ACTIVE | Noted: 2023-02-21

## 2023-02-21 PROBLEM — 27858009 CLOSTRIDIAL GASTROENTERITIS: Status: ACTIVE | Noted: 2020-07-26

## 2023-02-21 PROCEDURE — 99214 OFFICE O/P EST MOD 30 MIN: CPT | Performed by: INTERNAL MEDICINE

## 2023-02-21 RX ORDER — FIDAXOMICIN 200 MG/1
1 TABLET TABLET, FILM COATED ORAL TWICE A DAY
Qty: 20 | Refills: 0 | Status: DISCONTINUED | COMMUNITY
Start: 2020-06-15

## 2023-02-21 RX ORDER — ELUXADOLINE 75 MG/1
1 TABLET WITH FOOD TABLET, FILM COATED ORAL TWICE A DAY
Status: DISCONTINUED | COMMUNITY
Start: 2018-09-18

## 2023-02-21 RX ORDER — PRAVASTATIN SODIUM 20 MG/1
1 TABLET TABLET ORAL ONCE A DAY
Status: ACTIVE | COMMUNITY

## 2023-02-21 RX ORDER — FENUGREEK SEED/BL.THISTLE/ANIS 340 MG
AS DIRECTED CAPSULE ORAL
Status: ACTIVE | COMMUNITY

## 2023-02-21 RX ORDER — ESOMEPRAZOLE MAGNESIUM 40 MG/1
1 CAPSULE CAPSULE, DELAYED RELEASE ORAL ONCE A DAY
Status: DISCONTINUED | COMMUNITY

## 2023-02-21 RX ORDER — PNV NO.95/FERROUS FUM/FOLIC AC 28MG-0.8MG
3 TABLETS IN AM TABLET ORAL ONCE A DAY
Status: ACTIVE | COMMUNITY

## 2023-02-21 RX ORDER — CIPROFLOXACIN 500 MG/1
TAKE ONE TABLET BY MOUTH EVERY 12 HOURS FOR 10 DAYS TABLET, FILM COATED ORAL
Qty: 20 UNSPECIFIED | Refills: 0 | Status: DISCONTINUED | COMMUNITY

## 2023-02-21 RX ORDER — SULFAMETHOXAZOLE AND TRIMETHOPRIM 800; 160 MG/1; MG/1
1 TABLET TABLET ORAL TWICE A DAY
Qty: 20 | Refills: 0 | Status: DISCONTINUED | COMMUNITY
Start: 2020-03-13

## 2023-02-21 RX ORDER — LEVOMEFOLATE/ALGAL OIL 15-90.314
1 CAPSULE CAPSULE ORAL ONCE A DAY
Status: ACTIVE | COMMUNITY

## 2023-02-21 RX ORDER — MONTELUKAST SODIUM 10 MG/1
1 TABLET IN THE EVENING TABLET, FILM COATED ORAL ONCE A DAY
Status: ACTIVE | COMMUNITY

## 2023-02-21 RX ORDER — SPIRONOLACTONE 25 MG/1
1 TABLET TABLET ORAL ONCE A DAY
Status: ACTIVE | COMMUNITY

## 2023-02-21 RX ORDER — GABAPENTIN 100 MG/1
1 CAPSULE CAPSULE ORAL ONCE A DAY
Status: ACTIVE | COMMUNITY

## 2023-02-21 RX ORDER — ALPRAZOLAM 2 MG/1
1 TABLET TABLET, EXTENDED RELEASE ORAL TWICE A DAY
Status: ACTIVE | COMMUNITY

## 2023-02-21 RX ORDER — TRAMADOL HYDROCHLORIDE 50 MG/1
TABLET, FILM COATED ORAL TWICE DAILY
Status: DISCONTINUED | COMMUNITY

## 2023-02-21 RX ORDER — NEBIVOLOL HYDROCHLORIDE 5 MG/1
1 TABLET TABLET ORAL ONCE A DAY
Status: ACTIVE | COMMUNITY

## 2023-02-21 RX ORDER — ZOLPIDEM TARTRATE 5 MG/1
1 TABLET AT BEDTIME TABLET, FILM COATED ORAL ONCE A DAY
Status: DISCONTINUED | COMMUNITY
Start: 2021-01-12

## 2023-02-21 RX ORDER — BUPROPION HYDROCHLORIDE 200 MG/1
1 TABLET IN THE MORNING TABLET, FILM COATED, EXTENDED RELEASE ORAL ONCE A DAY
Status: ACTIVE | COMMUNITY

## 2023-02-21 RX ORDER — MESALAMINE 1000 MG/1
1 SUPPOSITORY SUPPOSITORY RECTAL BID
Qty: 60 | Refills: 1 | Status: DISCONTINUED | COMMUNITY
Start: 2018-07-13

## 2023-02-21 RX ORDER — SACCHAROMYCES BOULARDII 50 MG
1 CAPSULE CAPSULE ORAL TWICE A DAY
Status: ACTIVE | COMMUNITY

## 2023-02-21 RX ORDER — MAGNESIUM OXIDE/MAG AA CHELATE 300 MG
1 CAPSULE WITH A MEAL CAPSULE ORAL ONCE A DAY
Status: ACTIVE | COMMUNITY

## 2023-02-21 RX ORDER — VANCOMYCIN HYDROCHLORIDE 125 MG/1
1 CAPSULE CAPSULE ORAL
Qty: 40 | Refills: 0 | Status: DISCONTINUED | COMMUNITY
Start: 2020-06-03

## 2023-02-21 RX ORDER — SACCHAROMYCES BOULARDII 50 MG
1 CAPSULE CAPSULE ORAL TWICE A DAY
OUTPATIENT

## 2023-02-21 RX ORDER — MULTIVIT-MIN/IRON/FOLIC ACID/K 18-600-40
AS DIRECTED CAPSULE ORAL
Status: DISCONTINUED | COMMUNITY

## 2023-02-21 RX ORDER — HYDROCORTISONE ACETATE 0.5 %
AS DIRECTED CREAM (GRAM) TOPICAL
Status: ACTIVE | COMMUNITY

## 2023-02-21 RX ORDER — METRONIDAZOLE 500 MG/1
1 TABLET TABLET, FILM COATED ORAL THREE TIMES A DAY
Qty: 30 | Refills: 0 | Status: DISCONTINUED | COMMUNITY
Start: 2020-03-12

## 2023-02-21 RX ORDER — METRONIDAZOLE 500 MG/1
TAKE ONE TABLET BY MOUTH EVERY 8 HOURS FOR 10 DAYS TABLET, FILM COATED ORAL
Qty: 30 UNSPECIFIED | Refills: 0 | Status: DISCONTINUED | COMMUNITY

## 2023-02-21 RX ORDER — MIRABEGRON 50 MG/1
1 TABLET TABLET, FILM COATED, EXTENDED RELEASE ORAL ONCE A DAY
Qty: 30 | Status: ACTIVE | COMMUNITY

## 2023-02-21 RX ORDER — CIPROFLOXACIN HCL 500 MG
1 TABLET TABLET ORAL
Qty: 20 | Refills: 0 | Status: DISCONTINUED | COMMUNITY
Start: 2020-03-12

## 2023-02-21 RX ORDER — HYOSCYAMINE SULFATE 0.125 MG
1 TABLET AS NEEDED TABLET ORAL
Qty: 60 TABLET | Refills: 1 | Status: DISCONTINUED | COMMUNITY
Start: 2018-06-27

## 2023-02-21 RX ORDER — NIFEDIPINE
0.3% AS DIRECTED POWDER (GRAM) MISCELLANEOUS TID
Qty: 30 GRAM | Refills: 0 | Status: DISCONTINUED | COMMUNITY
Start: 2021-06-03

## 2023-02-21 RX ORDER — CELECOXIB 100 MG/1
1 CAPSULE WITH FOOD CAPSULE ORAL TWICE A DAY
Qty: 28 CAPSULE | Refills: 0 | Status: DISCONTINUED | COMMUNITY
Start: 2020-07-24

## 2023-02-21 RX ORDER — COLESEVELAM HYDROCHLORIDE 625 MG/1
3 TABLETS WITH MEALS TABLET, FILM COATED ORAL TWICE A DAY
Status: DISCONTINUED | COMMUNITY
Start: 2020-06-15

## 2023-02-21 RX ORDER — ICOSAPENT ETHYL 1000 MG/1
2 CAPSULES WITH MEALS CAPSULE ORAL TWICE A DAY
Qty: 120 | Status: ACTIVE | COMMUNITY

## 2023-02-21 RX ORDER — VORTIOXETINE 20 MG/1
1 TABLET TABLET, FILM COATED ORAL ONCE A DAY
Status: ACTIVE | COMMUNITY

## 2023-02-21 RX ORDER — CHOLESTYRAMINE 4 G/9G
1 PACKET MIXED WITH WATER OR NON-CARBONATED DRINK POWDER, FOR SUSPENSION ORAL TWICE A DAY
Qty: 60 | Refills: 1 | Status: DISCONTINUED | COMMUNITY
Start: 2020-06-11

## 2023-02-21 RX ORDER — NITROFURANTOIN MONOHYDRATE/MACROCRYSTALLINE 25; 75 MG/1; MG/1
1 CAPSULE AT BEDTIME WITH FOOD CAPSULE ORAL ONCE A DAY
Qty: 30 | Status: DISCONTINUED | COMMUNITY

## 2023-02-21 RX ORDER — PRASTERONE 6.5 MG/1
1 VAGINAL _INSERT AT BEDTIME INSERT VAGINAL ONCE A DAY
Qty: 30 | Status: DISCONTINUED | COMMUNITY

## 2023-02-21 RX ORDER — BUPROPION HYDROCHLORIDE 300 MG/1
1 TABLET IN THE MORNING TABLET, EXTENDED RELEASE ORAL ONCE A DAY
Status: DISCONTINUED | COMMUNITY

## 2023-02-21 RX ORDER — B-COMPLEX WITH VITAMIN C
1 TABLET TABLET ORAL ONCE A DAY
Status: ACTIVE | COMMUNITY

## 2023-02-21 RX ORDER — HYDROXYCHLOROQUINE SULFATE 100 MG/1
1 TABLET WITH FOOD OR MILK TABLET ORAL TWICE A DAY
Status: ACTIVE | COMMUNITY

## 2023-02-21 RX ORDER — HYDROXYCHLOROQUINE SULFATE 200 MG/1
1 TABLET WITH FOOD OR MILK TABLET ORAL ONCE A DAY
Status: ACTIVE | COMMUNITY

## 2023-02-21 RX ORDER — RIZATRIPTAN BENZOATE 5 MG/1
1 TABLET ON THE TONGUE AND ALLOW TO DISSOLVE AS NEEDED ONE TIME TABLET, ORALLY DISINTEGRATING ORAL ONCE A DAY
Status: DISCONTINUED | COMMUNITY

## 2023-02-21 RX ORDER — MELOXICAM 15 MG
1 TABLET TABLET ORAL TWICE A DAY
Status: DISCONTINUED | COMMUNITY

## 2023-02-21 NOTE — HPI-ABNORMAL FINDINGS
Denies having any recent imaging.   (Last Visit 01.11.2022) Denies completing any imaging since her last visit.    Last visit (7/13/2021)  She denies any imaging completed since her last visit.          Visit (01/12/2021) Patient recently had a CT completed revealing Diverticulitis. She has been treated with Cipro 500mg, Flagyl 500mg, and Dificid (prophylactically) x 10 days.

## 2023-02-21 NOTE — HPI-ELEVATED LIVER ENZYMES
She denies any recent episodes of jaundice, fatigue, dizziness, RUQ pain, bloating, easy bruising or chills.  Most recent labs were performed on 01.04.2023 which revealed ALT: 40H, Cholesterol,Total: 207H, LDL Chol Calc ([facility]): 106H, all other labs were normal.   RISK FACTORS: Denies Alcohol use, drug use, travel outside the [place], NSAIDs, protein supplements, tattoos, piercings,  service, blood transfusion, family history of liver disease or cancer, personal exposure to liver diseases, longterm, rehab      (Last Visit 01.11.2022) She denies any associated symptoms at this time. Patient reports her last labs were completed in [facility]. She admits that she will see her internist within the next 1-2 weeks and labs will be drawn at that time.     Last visit (7/13/2021)  Patient's last labs were completed on 6/22/2021 and was dx'd with Metabolic Syndrome. She currently denies any jaundice, dizziness, RUQ pain, bloating, easy bruising or chills. She admits daily fatigue.  Last visit (3/9/2021) Most recent labs were completed 12/2020. She currently denies any jaundice, dizziness, RUQ pain, bloating, easy bruising or chills. She admits daily fatigue.  Last visit (01/12/2021 CBC is the most recent labs completed on (12/11/2020) at [facility]. Currently denies jaundice, chills, RUQ pains, dizziness, or easy bruising.  Admits to having ocassional episodes of gas.

## 2023-02-21 NOTE — HPI-ABDOMINAL PAIN
Patient denies any recent episodes of abdominal pain at this time.   (Last Visit 01.11.2022) She denies any abdominal pain since her last visit.    Last visit (7/13/2021) Patient reports that the abdominal pain has resolved at this time.  Last visit (3/9/2021) Denies any episodes of abdominal pain since her last office visit.  Last visit (01/12/2021)  Patient was diagnosed with Diverticulitis via CT Abdomen at [facility] on (12/31/2020) ordered by Urogynecologist Dr. Matti Gutierrez following c/o severe LLQ abdominal pain. Subsequently she was treated with Flagyl 500 mg 1 TID and Cipro BID x 10 days. Dr. Gallagher added prophylactic treatment with Dificid 200 mg 1 BID. She completed the antibiotics yesterday.        She continue to admit intermittent LLQ abdominal pain, which she isn't sure if it's post op pain from her Laparoscopic supracervical hysterectomy and bilateral salpingectomy, and Laparoscopic sacrocervicopexy with Restorelle "Y"-shaped mesh performed on (12/14/2020).

## 2023-02-21 NOTE — HPI-GI BLEEDING/RECTAL PAIN
She continues to denies any recent GI Bleeds/Rectal pain.    (Last Visit 01.11.2022) Denies any episodes of rectal pain.   Last visit (71/3/2021) She denies any rectal pain at this time.  Last visit (3/9/2021)  Patient states she was diagnosed with Proctalgia fugax several years ago and was treated by a doctor in [place] years ago with a numbing ointment that helped. She c/o recent onset flare up with severe stabing pain around her rectum and anus that will occasionally last for 30 minutes at a time. Pain usualy begin at night. Patient would like to know if Dr Ivory will sent Rx for the ointment.         Patient state flare up began a few weeks ago. She admit being under alot of stress lately that could be the cause of the flare up. Symptoms only occur nocturnally.Subsequently, she was treated with Nifidipine 0.3 % TID for 4 weeks 30 gm

## 2023-02-21 NOTE — HPI-GAS
Patient denies any continued episodes of gas.   (Last Visit 01.11.2022) Patient reports that she continues to experience gas. She admits episodes of gas she reports that gas is passable via flatulence.   She denies trying anything OTC to help relief symptoms.   Last visit (7/13/2021)   She admits improvement in episodes of gas but admits symptoms still remain. Patient reports that she is able to pass the gas via flatulence.  Last visit (3/9/2021) Denies any episodes of flatulence or belching since her last office visit.  Last visit (01/12/2021) Denies any abnormal amounts of gas since her last visit.

## 2023-02-21 NOTE — HPI-FATTY LIVER
She admits continued use of Vitamin E 400units BID. Patient denies any jaundice, fatigue, dizziness, RUQ pain, bloating, easy bruising or chills.   Most recent labs were performed  on 01.04.2023 which revealed ALT: 40H, Cholesterol,Total: 207H, LDL Chol Calc ([facility]): 106H, all other labs were normal.   RISK FACTORS: Denies/Admits Alcohol use, drug use, travel outside the [place], NSAIDs, protein supplements, tattoos, piercings,  service, blood transfusion, family history of liver disease or cancer, personal exposure to liver diseases, correction, rehab      (Last Visit 01.11.2022) She denies any associated symptoms at this time.    Last visit (7/13/2021)  Admits the continued use of Vitamin E Capsule, 400 1 QD. She currently denies any jaundice, dizziness, RUQ pain, bloating, easy bruising or chills.  Visit (3/9/2021) She currently denies any jaundice, dizziness, RUQ pain, bloating, easy bruising or chills. She admits continued use of Vitamin E 400 mg BID. She admits daily fatigue.

## 2023-02-21 NOTE — HPI-CHANGE IN BOWEL HABITS
Patient present today for yearly follow-up. She admits improvements with her bowel habits. Currently, having 1-2 bowel movement a day, without strain. Stools are normal without the presence of blood, mucus, and melena. She denies any pruritus ani or rectal pain.    Patient continues the use of Florastor 250mg with improved symptoms.     (Last Visit 01.11.2022) Patient presents today for a follow up of Enterocolitis due to Clostridium difficile.  She continue the use of Floraster 250 mg 1 BID. She reports that her bowel habits have returned to normal.   Currently she reports 1-3 bowel movements a day without strain. Her stools are formed without blood, mucus, or melena. She denies any rectal pain or pruritus ani.    Last visit (7/13/2021) 61-Year-old female who presents today for a follow up of entercolitis. Patient denies any associated symptoms at this time.   Last visit (3/9/2021) 61-Year-old female patient presents today for a 2 month follow up of entercolitis. She denies any cramping/abdominal pain, fecal urgency/incontinence, fever, diarrhea. She admits daily fatigue. She reports starting Florastor 240 mg 1 BID with improvement of symptoms. She denies following up with Dr. Gallagher. Per Dr. Gallagher's office she was last seen 07/2020.          Of note:   Patient called the office 01/21/2021 reporting 3 BMs today with loose and watery stools after eating a salad she denies any blood or mucus present. She admits x2 days. She c/o bloating/gas, she admits her stomach is distended and she looks "like she is 4 months pregnant." She also c/o a globus sensation and a burning on her tongue as well as a yellow color to her tongue.  She was advised to be on liquids and soft diet for a few days. Pepcid 20 mg po qd for 14 days . Bentyl 10 mg po TID prn for 10 days.  Diarrhea:          She admits improvement in episodes of diarrhea.         She admits the continued use of Florastor Capsule, 250 MG, 1 capsule, Orally, Twice a day.        Currently reports 2-3 bowel movements a day. Her stools are semi formed without blood, mucus, or melena. She denies any rectal pain or pruritus ani.         Last visit (3/9/2021) Currently reports 2-3 bowel movements per day, without strain. Her stools are normal and formed. Denies any mucus, melena or blood in her stools. Denies any pruritus ani or rectal pain.

## 2024-07-29 ENCOUNTER — TELEPHONE ENCOUNTER (OUTPATIENT)
Dept: URBAN - METROPOLITAN AREA CLINIC 36 | Facility: CLINIC | Age: 64
End: 2024-07-29